# Patient Record
Sex: FEMALE | Race: ASIAN | NOT HISPANIC OR LATINO | URBAN - METROPOLITAN AREA
[De-identification: names, ages, dates, MRNs, and addresses within clinical notes are randomized per-mention and may not be internally consistent; named-entity substitution may affect disease eponyms.]

---

## 2019-04-07 RX ORDER — LEVOTHYROXINE SODIUM 150 UG/1
TABLET ORAL
Qty: 30 TABLET | Refills: 3 | Status: SHIPPED | OUTPATIENT
Start: 2019-04-07 | End: 2019-09-19 | Stop reason: SDUPTHER

## 2019-04-29 RX ORDER — LEVOTHYROXINE SODIUM 125 UG/1
TABLET ORAL
Qty: 30 TABLET | Refills: 3 | OUTPATIENT
Start: 2019-04-29

## 2019-05-20 ENCOUNTER — OFFICE VISIT (OUTPATIENT)
Dept: INTERNAL MEDICINE | Facility: CLINIC | Age: 54
End: 2019-05-20
Payer: COMMERCIAL

## 2019-05-20 VITALS
HEART RATE: 74 BPM | TEMPERATURE: 98 F | SYSTOLIC BLOOD PRESSURE: 132 MMHG | OXYGEN SATURATION: 98 % | WEIGHT: 179 LBS | RESPIRATION RATE: 16 BRPM | DIASTOLIC BLOOD PRESSURE: 82 MMHG | BODY MASS INDEX: 35.14 KG/M2 | HEIGHT: 60 IN

## 2019-05-20 DIAGNOSIS — Z00.00 ROUTINE GENERAL MEDICAL EXAMINATION AT A HEALTH CARE FACILITY: Primary | ICD-10-CM

## 2019-05-20 DIAGNOSIS — Z23 NEED FOR TDAP VACCINATION: ICD-10-CM

## 2019-05-20 DIAGNOSIS — E03.9 HYPOTHYROIDISM, UNSPECIFIED TYPE: ICD-10-CM

## 2019-05-20 DIAGNOSIS — E66.9 CLASS 1 OBESITY WITH BODY MASS INDEX (BMI) OF 34.0 TO 34.9 IN ADULT, UNSPECIFIED OBESITY TYPE, UNSPECIFIED WHETHER SERIOUS COMORBIDITY PRESENT: ICD-10-CM

## 2019-05-20 DIAGNOSIS — E53.8 DISORDER OF VITAMIN B12: ICD-10-CM

## 2019-05-20 PROBLEM — D64.9 ANEMIA: Status: ACTIVE | Noted: 2019-05-20

## 2019-05-20 PROBLEM — D50.9 IRON DEFICIENCY ANEMIA: Status: ACTIVE | Noted: 2019-05-20

## 2019-05-20 PROBLEM — J45.909 ASTHMA: Status: ACTIVE | Noted: 2019-05-20

## 2019-05-20 PROCEDURE — 99396 PR PREVENTIVE VISIT,EST,40-64: ICD-10-PCS | Mod: S$GLB,,, | Performed by: INTERNAL MEDICINE

## 2019-05-20 PROCEDURE — 99396 PREV VISIT EST AGE 40-64: CPT | Mod: S$GLB,,, | Performed by: INTERNAL MEDICINE

## 2019-05-20 RX ORDER — IPRATROPIUM BROMIDE AND ALBUTEROL SULFATE 2.5; .5 MG/3ML; MG/3ML
SOLUTION RESPIRATORY (INHALATION)
COMMUNITY
End: 2021-08-02

## 2019-05-20 RX ORDER — CLOTRIMAZOLE AND BETAMETHASONE DIPROPIONATE 10; .64 MG/G; MG/G
CREAM TOPICAL
COMMUNITY
End: 2021-08-18

## 2019-05-20 RX ORDER — ALBUTEROL SULFATE 90 UG/1
AEROSOL, METERED RESPIRATORY (INHALATION)
COMMUNITY
End: 2019-11-13 | Stop reason: SDUPTHER

## 2019-05-20 RX ORDER — MONTELUKAST SODIUM 10 MG/1
1 TABLET ORAL DAILY
COMMUNITY
End: 2019-11-16 | Stop reason: SDUPTHER

## 2019-05-20 RX ORDER — FLUTICASONE FUROATE, UMECLIDINIUM BROMIDE AND VILANTEROL TRIFENATATE 100; 62.5; 25 UG/1; UG/1; UG/1
1 POWDER RESPIRATORY (INHALATION) DAILY
Refills: 5 | COMMUNITY
Start: 2019-03-12 | End: 2019-11-13 | Stop reason: SDUPTHER

## 2019-05-20 RX ORDER — FLUTICASONE PROPIONATE 50 MCG
2 SPRAY, SUSPENSION (ML) NASAL NIGHTLY
COMMUNITY

## 2019-05-20 RX ORDER — EPINEPHRINE 0.3 MG/.3ML
INJECTION SUBCUTANEOUS
COMMUNITY
End: 2019-08-30 | Stop reason: SDUPTHER

## 2019-05-20 NOTE — PROGRESS NOTES
Subjective:      Patient ID: Adri Thomason is a 54 y.o. female.    Chief Complaint: Well Woman    HPI: Patient with h/o Hypothyroidism, last tsh was at goal. Patient reports intentional weight loss ( 5 Lbs) with intermittent fasting, fatigue is improved, no dry skin and hair loss.    Patient with recently diagnosed DM. Patient has lost 25 lbs, and is off of metformin + last A1c was 5.9. Patient denies polyuria, polydipsia, no numbness in hands and feet.    Patient blood pressures are much better controlled though patient is off of Losartan. Patient denies any dizziness, lightheadedness no chest pain or shortness of breath.    Patient with h/o asthma symptoms are under good control with Trelegy and patient is being followed by Allergist/Immunologist. patient reports symptoms are much improved and she rarely uses rescue inhaler.    Patient with history of vitamin B12 deficiency. Patient is getting B12 supplement/injection every month. Patient repeat B12 levels were high.     Review of Systems   Constitutional: Negative for chills, diaphoresis, fever, malaise/fatigue and weight loss.   HENT: Negative for congestion, ear pain, sinus pain, sore throat and tinnitus.    Eyes: Negative for blurred vision and photophobia.   Respiratory: Negative for cough, hemoptysis, shortness of breath and wheezing.    Cardiovascular: Negative for chest pain, palpitations, orthopnea, leg swelling and PND.   Gastrointestinal: Negative for abdominal pain, blood in stool, constipation, diarrhea, heartburn, melena, nausea and vomiting.   Genitourinary: Negative for dysuria, frequency and urgency.   Musculoskeletal: Negative for back pain, myalgias and neck pain.   Skin: Negative for rash.   Neurological: Negative for dizziness, tremors, seizures, loss of consciousness and weakness.   Endo/Heme/Allergies: Negative for polydipsia.   Psychiatric/Behavioral: Negative for depression and hallucinations. The patient does not have insomnia.       Objective:     Physical Exam   Constitutional: She is oriented to person, place, and time. No distress.   Neck: No thyromegaly present.   Cardiovascular: Normal rate, regular rhythm and normal heart sounds.   No murmur heard.  Pulmonary/Chest: Effort normal and breath sounds normal. No respiratory distress. She has no wheezes. She exhibits no tenderness.   Abdominal: Soft. Bowel sounds are normal. She exhibits no distension. There is no tenderness.   Musculoskeletal: She exhibits no edema or tenderness.   Lymphadenopathy:     She has no cervical adenopathy.   Neurological: She is alert and oriented to person, place, and time. No cranial nerve deficit or sensory deficit.   Skin: She is not diaphoretic.   Psychiatric: She has a normal mood and affect. Her behavior is normal. Judgment and thought content normal.     Assessment:     1. Hypothyroidism, unspecified type    2. Disorder of vitamin B12    3. Class 1 obesity with body mass index (BMI) of 34.0 to 34.9 in adult, unspecified obesity type, unspecified whether serious comorbidity present    4. Routine general medical examination at a health care facility       Plan:   Patient is using levothyroxine 150 mcg.  Will check thyroid function on return  Patient last vitamin B12 levels were high..  Will switch to oral B12 2000 mcg daily   Patient is losing lot of weight with aggressive diet and intermittent fasting.  Patient is encouraged to continue  Patient declined colonoscopy but will do cologuard.  Orders already placed  Patient mammogram is up-to-date.   Patient to get Pap smear schedule.

## 2019-08-30 RX ORDER — EPINEPHRINE 0.3 MG/.3ML
INJECTION SUBCUTANEOUS
Qty: 1 DEVICE | Refills: 3 | Status: SHIPPED | OUTPATIENT
Start: 2019-08-30 | End: 2022-04-21

## 2019-08-30 NOTE — TELEPHONE ENCOUNTER
Patient requesting new rx  EpiPen 0.3 mg auto-injector 2 pack  Use 1 injection as needed for severe allergic reaction  
sudden onset

## 2019-09-19 RX ORDER — LEVOTHYROXINE SODIUM 150 UG/1
TABLET ORAL
Qty: 30 TABLET | Refills: 3 | Status: SHIPPED | OUTPATIENT
Start: 2019-09-19 | End: 2020-02-28

## 2019-09-20 LAB — CRC RECOMMENDATION EXT: NORMAL

## 2019-10-22 LAB — PAP RECOMMENDATION EXT: NORMAL

## 2019-11-12 DIAGNOSIS — J45.909 ASTHMA, UNSPECIFIED ASTHMA SEVERITY, UNSPECIFIED WHETHER COMPLICATED, UNSPECIFIED WHETHER PERSISTENT: Primary | ICD-10-CM

## 2019-11-13 RX ORDER — ALBUTEROL SULFATE 90 UG/1
AEROSOL, METERED RESPIRATORY (INHALATION)
Qty: 18 INHALER | Refills: 3 | Status: SHIPPED | OUTPATIENT
Start: 2019-11-13 | End: 2022-08-15 | Stop reason: SDUPTHER

## 2019-11-13 RX ORDER — FLUTICASONE FUROATE, UMECLIDINIUM BROMIDE AND VILANTEROL TRIFENATATE 100; 62.5; 25 UG/1; UG/1; UG/1
POWDER RESPIRATORY (INHALATION)
Qty: 3 EACH | Refills: 3 | Status: SHIPPED | OUTPATIENT
Start: 2019-11-13 | End: 2020-01-31 | Stop reason: SDUPTHER

## 2019-11-18 RX ORDER — MONTELUKAST SODIUM 10 MG/1
TABLET ORAL
Qty: 30 TABLET | Refills: 11 | Status: SHIPPED | OUTPATIENT
Start: 2019-11-18 | End: 2020-02-27 | Stop reason: SDUPTHER

## 2020-01-31 DIAGNOSIS — J45.909 ASTHMA, UNSPECIFIED ASTHMA SEVERITY, UNSPECIFIED WHETHER COMPLICATED, UNSPECIFIED WHETHER PERSISTENT: ICD-10-CM

## 2020-02-07 ENCOUNTER — OFFICE VISIT (OUTPATIENT)
Dept: INTERNAL MEDICINE | Facility: CLINIC | Age: 55
End: 2020-02-07
Payer: COMMERCIAL

## 2020-02-07 VITALS
SYSTOLIC BLOOD PRESSURE: 132 MMHG | HEIGHT: 60 IN | HEART RATE: 67 BPM | RESPIRATION RATE: 16 BRPM | WEIGHT: 194 LBS | BODY MASS INDEX: 38.09 KG/M2 | DIASTOLIC BLOOD PRESSURE: 90 MMHG | OXYGEN SATURATION: 97 % | TEMPERATURE: 98 F

## 2020-02-07 DIAGNOSIS — E03.9 HYPOTHYROIDISM, UNSPECIFIED TYPE: ICD-10-CM

## 2020-02-07 DIAGNOSIS — E66.9 OBESITY (BMI 35.0-39.9 WITHOUT COMORBIDITY): ICD-10-CM

## 2020-02-07 DIAGNOSIS — J45.901 ASTHMA WITH ACUTE EXACERBATION, UNSPECIFIED ASTHMA SEVERITY, UNSPECIFIED WHETHER PERSISTENT: ICD-10-CM

## 2020-02-07 DIAGNOSIS — Z00.00 ROUTINE GENERAL MEDICAL EXAMINATION AT A HEALTH CARE FACILITY: Primary | ICD-10-CM

## 2020-02-07 PROCEDURE — 99396 PR PREVENTIVE VISIT,EST,40-64: ICD-10-PCS | Mod: 25,S$GLB,, | Performed by: INTERNAL MEDICINE

## 2020-02-07 PROCEDURE — 99396 PREV VISIT EST AGE 40-64: CPT | Mod: 25,S$GLB,, | Performed by: INTERNAL MEDICINE

## 2020-02-07 PROCEDURE — 96372 PR INJECTION,THERAP/PROPH/DIAG2ST, IM OR SUBCUT: ICD-10-PCS | Mod: S$GLB,,, | Performed by: INTERNAL MEDICINE

## 2020-02-07 PROCEDURE — 96372 THER/PROPH/DIAG INJ SC/IM: CPT | Mod: S$GLB,,, | Performed by: INTERNAL MEDICINE

## 2020-02-07 RX ORDER — FLUTICASONE FUROATE, UMECLIDINIUM BROMIDE AND VILANTEROL TRIFENATATE 100; 62.5; 25 UG/1; UG/1; UG/1
1 POWDER RESPIRATORY (INHALATION) DAILY
COMMUNITY
Start: 2020-02-04 | End: 2020-02-07

## 2020-02-07 RX ORDER — PREDNISONE 20 MG/1
20 TABLET ORAL DAILY
Qty: 7 TABLET | Refills: 0 | Status: SHIPPED | OUTPATIENT
Start: 2020-02-07 | End: 2020-02-14

## 2020-02-07 RX ORDER — BETAMETHASONE SODIUM PHOSPHATE AND BETAMETHASONE ACETATE 3; 3 MG/ML; MG/ML
6 INJECTION, SUSPENSION INTRA-ARTICULAR; INTRALESIONAL; INTRAMUSCULAR; SOFT TISSUE
Status: COMPLETED | OUTPATIENT
Start: 2020-02-07 | End: 2020-02-07

## 2020-02-07 RX ORDER — PROMETHAZINE HYDROCHLORIDE AND DEXTROMETHORPHAN HYDROBROMIDE 6.25; 15 MG/5ML; MG/5ML
5 SYRUP ORAL EVERY 4 HOURS PRN
Qty: 180 ML | Refills: 0 | Status: SHIPPED | OUTPATIENT
Start: 2020-02-07 | End: 2020-02-13

## 2020-02-07 RX ADMIN — BETAMETHASONE SODIUM PHOSPHATE AND BETAMETHASONE ACETATE 6 MG: 3; 3 INJECTION, SUSPENSION INTRA-ARTICULAR; INTRALESIONAL; INTRAMUSCULAR; SOFT TISSUE at 10:02

## 2020-02-07 NOTE — PROGRESS NOTES
Subjective:      Patient ID: Adri Thomason is a 54 y.o. female.    Chief Complaint: Cough and Wheezing    Patient with h/o Asthma came in today with complains of shortness of breath + cough x 3 days. Patient reports that she feels feverish but never had fever. Patient reports some runny nose + nasal congestion, post nasal drip. Patient is taking Trelegy and Ventolin with little help. Patient is taking Nebuliser frequently with little help. Patient reports bilateral rib pains because of cough.      Patient with h/o Hypothyroidism, last tsh was at goal. Patient reports intentional weight gained ( 15 Lbs) with intermittent fasting, fatigue is improved, no dry skin and hair loss.    Patient was recently diagnosed with DM.  Patient diet and exercise aggressively and lost 25 lb and patient's last A1c was 5.9.  Patient has gained 15 lb in last few months Patient denies polyuria, polydipsia, no numbness in hands and feet.    Patient blood pressures are much better controlled after losing weight without Losartan. Patient reports that for the last few weeks the BP has been running high.  Patient denies any dizziness, lightheadedness no chest pain or shortness of breath.      Review of Systems   Constitutional: Negative for chills, diaphoresis, fever, malaise/fatigue and weight loss (15 lbs).   HENT: Negative for congestion, ear pain, sinus pain, sore throat and tinnitus.    Eyes: Negative for blurred vision and photophobia.   Respiratory: Positive for cough, shortness of breath and wheezing. Negative for hemoptysis.    Cardiovascular: Negative for chest pain, palpitations, orthopnea, leg swelling and PND.   Gastrointestinal: Negative for abdominal pain, blood in stool, constipation, diarrhea, heartburn, melena, nausea and vomiting.   Genitourinary: Negative for dysuria, frequency and urgency.   Musculoskeletal: Negative for back pain, myalgias and neck pain.   Skin: Negative for rash.   Neurological: Positive for headaches.  Negative for dizziness, tremors, seizures, loss of consciousness and weakness.   Endo/Heme/Allergies: Negative for polydipsia.   Psychiatric/Behavioral: Negative for depression and hallucinations. The patient does not have insomnia.      Objective:     Physical Exam   Constitutional: She is oriented to person, place, and time. No distress.   Neck: No thyromegaly present.   Cardiovascular: Normal rate, regular rhythm and normal heart sounds.   No murmur heard.  Pulmonary/Chest: Effort normal and breath sounds normal. No respiratory distress. She has no wheezes. She exhibits no tenderness.   Bilateral rib tenderness to palpation   Abdominal: Soft. Bowel sounds are normal. She exhibits no distension. There is no tenderness.   Musculoskeletal: She exhibits no edema or tenderness.   Lymphadenopathy:     She has no cervical adenopathy.   Neurological: She is alert and oriented to person, place, and time. No cranial nerve deficit or sensory deficit.   Skin: She is not diaphoretic.   Psychiatric: She has a normal mood and affect. Her behavior is normal. Judgment and thought content normal.     Assessment:     1. Routine general medical examination at a health care facility    2. Asthma with acute exacerbation, unspecified asthma severity, unspecified whether persistent    3. Obesity (BMI 35.0-39.9 without comorbidity)    4. Hypothyroidism, unspecified type       Plan:   Patient seem to have acute asthma exacerbation.  Will give Celestone shot + prednisone x 7 days.  Patient has nebulizer treat at home.   Will order annual wellness exam labs + will check A1c + vitamin-D and B12  Patient previously lost 25 lb with intermittent fasting and exercise.  Patient is lack Potts and seem to have gained some weight back.    Advised patient to adhere with diet and exercise.  Patient last TSH was at goal.  Will check thyroid functions again  Patient blood pressures are running high.  Advised patient to monitor blood pressures at  home  If blood pressures stay high will add losartan again

## 2020-02-12 DIAGNOSIS — Z20.828 EXPOSURE TO THE FLU: Primary | ICD-10-CM

## 2020-02-12 DIAGNOSIS — J45.901 ASTHMA WITH ACUTE EXACERBATION, UNSPECIFIED ASTHMA SEVERITY, UNSPECIFIED WHETHER PERSISTENT: ICD-10-CM

## 2020-02-12 RX ORDER — OSELTAMIVIR PHOSPHATE 75 MG/1
75 CAPSULE ORAL DAILY
Qty: 7 CAPSULE | Refills: 0 | Status: SHIPPED | OUTPATIENT
Start: 2020-02-12 | End: 2020-02-17

## 2020-02-13 RX ORDER — PROMETHAZINE HYDROCHLORIDE AND DEXTROMETHORPHAN HYDROBROMIDE 6.25; 15 MG/5ML; MG/5ML
SYRUP ORAL
Qty: 180 ML | Refills: 0 | Status: SHIPPED | OUTPATIENT
Start: 2020-02-13 | End: 2021-05-05

## 2020-02-27 DIAGNOSIS — J45.909 ASTHMA, UNSPECIFIED ASTHMA SEVERITY, UNSPECIFIED WHETHER COMPLICATED, UNSPECIFIED WHETHER PERSISTENT: ICD-10-CM

## 2020-02-27 RX ORDER — MONTELUKAST SODIUM 10 MG/1
10 TABLET ORAL DAILY
Qty: 90 TABLET | Refills: 3 | Status: SHIPPED | OUTPATIENT
Start: 2020-02-27 | End: 2020-05-27

## 2020-02-28 RX ORDER — LEVOTHYROXINE SODIUM 150 UG/1
TABLET ORAL
Qty: 30 TABLET | Refills: 3 | Status: SHIPPED | OUTPATIENT
Start: 2020-02-28 | End: 2021-02-11

## 2020-05-26 DIAGNOSIS — B37.31 CANDIDA VAGINITIS: Primary | ICD-10-CM

## 2020-05-26 RX ORDER — FLUCONAZOLE 150 MG/1
150 TABLET ORAL DAILY
Qty: 1 TABLET | Refills: 1 | Status: SHIPPED | OUTPATIENT
Start: 2020-05-26 | End: 2020-05-27

## 2020-10-19 DIAGNOSIS — B37.31 CANDIDA VAGINITIS: ICD-10-CM

## 2020-10-19 DIAGNOSIS — J45.909 ASTHMA, UNSPECIFIED ASTHMA SEVERITY, UNSPECIFIED WHETHER COMPLICATED, UNSPECIFIED WHETHER PERSISTENT: Primary | ICD-10-CM

## 2020-10-19 RX ORDER — MONTELUKAST SODIUM 10 MG/1
10 TABLET ORAL NIGHTLY
Qty: 30 TABLET | Refills: 11 | Status: SHIPPED | OUTPATIENT
Start: 2020-10-19 | End: 2020-11-18

## 2020-10-19 RX ORDER — FLUCONAZOLE 150 MG/1
150 TABLET ORAL DAILY
Qty: 1 TABLET | Refills: 0 | Status: SHIPPED | OUTPATIENT
Start: 2020-10-19 | End: 2020-10-20

## 2020-12-08 ENCOUNTER — CLINICAL SUPPORT (OUTPATIENT)
Dept: PRIMARY CARE CLINIC | Facility: CLINIC | Age: 55
End: 2020-12-08
Payer: COMMERCIAL

## 2020-12-08 NOTE — PROGRESS NOTES
Pt came in today to get weekly allergy injections... Purple Vial #1 Conc: 1:1,000,000 V/V Exp 10/2021 0.10cc given in the Upper Left Arm;  Purple Vial#2  Conc: 1:1,000,000 V/V Exp 10/2021  .010cc given in the Lower Left Arm;  Purple Vial #3  Conc: 1:1,000,000 V/V  Exp 10/2021 0.10cc given in the Upper Right Arm;  Purple Vial #4 Conc: 1:1,000,000 V/V Exp 10/2021 0.10cc given in the Lower Right Arm.. Pt had no reactions to injections and tolerated well... pt received 0.10cc today because she was 18 days late and had to repeat prior dose.  Pt next date to received injection will be 12/15/2020.  Pt understood if any reaction occur to contact MD office, go to nearest ER or call 911.

## 2020-12-16 ENCOUNTER — CLINICAL SUPPORT (OUTPATIENT)
Dept: PRIMARY CARE CLINIC | Facility: CLINIC | Age: 55
End: 2020-12-16
Payer: COMMERCIAL

## 2020-12-16 NOTE — PROGRESS NOTES
Pt came in today to get weekly allergy injections... Purple Vial #1 Conc: 1:1,000,000 V/V Exp 10/2021 0.20cc given in the Upper Left Arm;  Purple Vial#2  Conc: 1:1,000,000 V/V Exp 10/2021  .020cc given in the Lower Left Arm;  Purple Vial #3  Conc: 1:1,000,000 V/V  Exp 10/2021 0.20cc given in the Upper Right Arm;  Purple Vial #4 Conc: 1:1,000,000 V/V Exp 10/2021 0.20cc given in the Lower Right Arm.. Pt had no reactions to injections and tolerated well... pt received 0.20cc today.  Pt next date to received injection will be 12/22/2020.  Pt understood if any reaction occur to contact MD office, go to nearest ER or call 911.

## 2020-12-23 ENCOUNTER — CLINICAL SUPPORT (OUTPATIENT)
Dept: PRIMARY CARE CLINIC | Facility: CLINIC | Age: 55
End: 2020-12-23
Payer: COMMERCIAL

## 2020-12-23 NOTE — PROGRESS NOTES
Pt came in today to get weekly allergy injections... Purple Vial #1 Conc: 1:1,000,000 V/V Exp 10/2021 0.30cc given in the Upper Left Arm;  Purple Vial#2  Conc: 1:1,000,000 V/V Exp 10/2021  .030cc given in the Lower Left Arm;  Purple Vial #3  Conc: 1:1,000,000 V/V  Exp 10/2021 0.30cc given in the Upper Right Arm;  Purple Vial #4 Conc: 1:1,000,000 V/V Exp 10/2021 0.30cc given in the Lower Right Arm.. Pt had no reactions to injections and tolerated well... pt received 0.30cc today.  Pt next date to received injection will be 12/30/2020.  Pt understood if any reaction occur to contact MD office, go to nearest ER or call 911.

## 2020-12-28 ENCOUNTER — TELEPHONE (OUTPATIENT)
Dept: PRIMARY CARE CLINIC | Facility: CLINIC | Age: 55
End: 2020-12-28

## 2020-12-28 NOTE — TELEPHONE ENCOUNTER
"Patient is requesting a prescription be sent out for her "water pill". I don't see any medication in her chart  as such but she states you know what they are. She also states that it is HCT but doesn't recall the strength. She states that she wants its to go to [facility] in [place] since she's out of town due to family emergency. The phone number to the pharmacy is [phone]  "

## 2020-12-28 NOTE — TELEPHONE ENCOUNTER
"----- Message from Ernestina Balderrama sent at 12/28/2020  9:05 AM CST -----  Regarding: refill   Patient is in [place] due to family emergency and asking for a refill of her "water pill" but I don't see anything in her chart.  Please call her to discuss.  She wants refill sent to [facility] on [address] in [place].  Phone to pharmacy is [phone].    "

## 2021-01-04 ENCOUNTER — CLINICAL SUPPORT (OUTPATIENT)
Dept: PRIMARY CARE CLINIC | Facility: CLINIC | Age: 56
End: 2021-01-04
Payer: COMMERCIAL

## 2021-01-12 ENCOUNTER — CLINICAL SUPPORT (OUTPATIENT)
Dept: PRIMARY CARE CLINIC | Facility: CLINIC | Age: 56
End: 2021-01-12
Payer: COMMERCIAL

## 2021-01-13 DIAGNOSIS — Z00.00 ROUTINE GENERAL MEDICAL EXAMINATION AT A HEALTH CARE FACILITY: Primary | ICD-10-CM

## 2021-01-20 ENCOUNTER — CLINICAL SUPPORT (OUTPATIENT)
Dept: PRIMARY CARE CLINIC | Facility: CLINIC | Age: 56
End: 2021-01-20
Payer: COMMERCIAL

## 2021-01-20 DIAGNOSIS — Z51.6 DESENSITIZATION TO ALLERGY SHOT: Primary | ICD-10-CM

## 2021-01-28 ENCOUNTER — CLINICAL SUPPORT (OUTPATIENT)
Dept: PRIMARY CARE CLINIC | Facility: CLINIC | Age: 56
End: 2021-01-28
Payer: COMMERCIAL

## 2021-01-28 DIAGNOSIS — J45.909 ASTHMA, UNSPECIFIED ASTHMA SEVERITY, UNSPECIFIED WHETHER COMPLICATED, UNSPECIFIED WHETHER PERSISTENT: Primary | ICD-10-CM

## 2021-02-04 ENCOUNTER — CLINICAL SUPPORT (OUTPATIENT)
Dept: PRIMARY CARE CLINIC | Facility: CLINIC | Age: 56
End: 2021-02-04
Payer: COMMERCIAL

## 2021-02-04 DIAGNOSIS — Z00.00 ROUTINE GENERAL MEDICAL EXAMINATION AT A HEALTH CARE FACILITY: Primary | ICD-10-CM

## 2021-02-11 ENCOUNTER — CLINICAL SUPPORT (OUTPATIENT)
Dept: PRIMARY CARE CLINIC | Facility: CLINIC | Age: 56
End: 2021-02-11
Payer: COMMERCIAL

## 2021-02-25 ENCOUNTER — CLINICAL SUPPORT (OUTPATIENT)
Dept: PRIMARY CARE CLINIC | Facility: CLINIC | Age: 56
End: 2021-02-25
Payer: COMMERCIAL

## 2021-03-10 ENCOUNTER — OFFICE VISIT (OUTPATIENT)
Dept: OBSTETRICS AND GYNECOLOGY | Facility: CLINIC | Age: 56
End: 2021-03-10
Payer: COMMERCIAL

## 2021-03-10 VITALS
DIASTOLIC BLOOD PRESSURE: 91 MMHG | HEIGHT: 59 IN | BODY MASS INDEX: 39.31 KG/M2 | WEIGHT: 195 LBS | SYSTOLIC BLOOD PRESSURE: 140 MMHG

## 2021-03-10 DIAGNOSIS — Z00.00 ENCOUNTER FOR WELLNESS EXAMINATION: Primary | ICD-10-CM

## 2021-03-10 DIAGNOSIS — N95.2 ATROPHIC VAGINITIS: ICD-10-CM

## 2021-03-10 DIAGNOSIS — N76.0 VAGINITIS AND VULVOVAGINITIS: ICD-10-CM

## 2021-03-10 PROCEDURE — 99396 PR PREVENTIVE VISIT,EST,40-64: ICD-10-PCS | Mod: S$GLB,,, | Performed by: OBSTETRICS & GYNECOLOGY

## 2021-03-10 PROCEDURE — 3008F BODY MASS INDEX DOCD: CPT | Mod: CPTII,S$GLB,, | Performed by: OBSTETRICS & GYNECOLOGY

## 2021-03-10 PROCEDURE — 99396 PREV VISIT EST AGE 40-64: CPT | Mod: S$GLB,,, | Performed by: OBSTETRICS & GYNECOLOGY

## 2021-03-10 PROCEDURE — 3008F PR BODY MASS INDEX (BMI) DOCUMENTED: ICD-10-PCS | Mod: CPTII,S$GLB,, | Performed by: OBSTETRICS & GYNECOLOGY

## 2021-03-10 RX ORDER — MONTELUKAST SODIUM 10 MG/1
10 TABLET ORAL NIGHTLY
COMMUNITY
Start: 2021-01-15 | End: 2022-01-18

## 2021-03-10 RX ORDER — CLOBETASOL PROPIONATE 0.5 MG/G
OINTMENT TOPICAL 2 TIMES DAILY
Qty: 1 TUBE | Refills: 3 | Status: SHIPPED | OUTPATIENT
Start: 2021-03-10 | End: 2024-02-22

## 2021-03-10 RX ORDER — ESTRADIOL 0.1 MG/G
2 CREAM VAGINAL DAILY
Qty: 1 TUBE | Refills: 11 | Status: SHIPPED | OUTPATIENT
Start: 2021-03-10 | End: 2022-03-07

## 2021-03-10 RX ORDER — FLUCONAZOLE 150 MG/1
150 TABLET ORAL EVERY OTHER DAY
Qty: 2 TABLET | Refills: 0 | Status: SHIPPED | OUTPATIENT
Start: 2021-03-10 | End: 2021-03-13

## 2021-03-18 ENCOUNTER — CLINICAL SUPPORT (OUTPATIENT)
Dept: PRIMARY CARE CLINIC | Facility: CLINIC | Age: 56
End: 2021-03-18
Payer: COMMERCIAL

## 2021-03-18 DIAGNOSIS — J45.909 ASTHMA, UNSPECIFIED ASTHMA SEVERITY, UNSPECIFIED WHETHER COMPLICATED, UNSPECIFIED WHETHER PERSISTENT: Primary | ICD-10-CM

## 2021-04-01 ENCOUNTER — CLINICAL SUPPORT (OUTPATIENT)
Dept: PRIMARY CARE CLINIC | Facility: CLINIC | Age: 56
End: 2021-04-01
Payer: COMMERCIAL

## 2021-04-13 ENCOUNTER — CLINICAL SUPPORT (OUTPATIENT)
Dept: PRIMARY CARE CLINIC | Facility: CLINIC | Age: 56
End: 2021-04-13
Payer: COMMERCIAL

## 2021-04-20 ENCOUNTER — CLINICAL SUPPORT (OUTPATIENT)
Dept: PRIMARY CARE CLINIC | Facility: CLINIC | Age: 56
End: 2021-04-20
Payer: COMMERCIAL

## 2021-05-05 ENCOUNTER — TELEPHONE (OUTPATIENT)
Dept: OBSTETRICS AND GYNECOLOGY | Facility: CLINIC | Age: 56
End: 2021-05-05

## 2021-05-28 ENCOUNTER — CLINICAL SUPPORT (OUTPATIENT)
Dept: PRIMARY CARE CLINIC | Facility: CLINIC | Age: 56
End: 2021-05-28
Payer: COMMERCIAL

## 2021-06-03 ENCOUNTER — PATIENT OUTREACH (OUTPATIENT)
Dept: ADMINISTRATIVE | Facility: HOSPITAL | Age: 56
End: 2021-06-03

## 2021-06-04 ENCOUNTER — CLINICAL SUPPORT (OUTPATIENT)
Dept: PRIMARY CARE CLINIC | Facility: CLINIC | Age: 56
End: 2021-06-04
Payer: COMMERCIAL

## 2021-06-04 DIAGNOSIS — J45.909 ASTHMA, UNSPECIFIED ASTHMA SEVERITY, UNSPECIFIED WHETHER COMPLICATED, UNSPECIFIED WHETHER PERSISTENT: Primary | ICD-10-CM

## 2021-06-10 ENCOUNTER — CLINICAL SUPPORT (OUTPATIENT)
Dept: PRIMARY CARE CLINIC | Facility: CLINIC | Age: 56
End: 2021-06-10
Payer: COMMERCIAL

## 2021-06-10 DIAGNOSIS — J45.909 ASTHMA, UNSPECIFIED ASTHMA SEVERITY, UNSPECIFIED WHETHER COMPLICATED, UNSPECIFIED WHETHER PERSISTENT: Primary | ICD-10-CM

## 2021-06-24 ENCOUNTER — CLINICAL SUPPORT (OUTPATIENT)
Dept: PRIMARY CARE CLINIC | Facility: CLINIC | Age: 56
End: 2021-06-24
Payer: COMMERCIAL

## 2021-06-24 DIAGNOSIS — J45.909 ASTHMA, UNSPECIFIED ASTHMA SEVERITY, UNSPECIFIED WHETHER COMPLICATED, UNSPECIFIED WHETHER PERSISTENT: Primary | ICD-10-CM

## 2021-07-06 ENCOUNTER — CLINICAL SUPPORT (OUTPATIENT)
Dept: PRIMARY CARE CLINIC | Facility: CLINIC | Age: 56
End: 2021-07-06
Payer: COMMERCIAL

## 2021-07-06 DIAGNOSIS — J45.909 ASTHMA, UNSPECIFIED ASTHMA SEVERITY, UNSPECIFIED WHETHER COMPLICATED, UNSPECIFIED WHETHER PERSISTENT: Primary | ICD-10-CM

## 2021-07-06 DIAGNOSIS — J45.901 EXACERBATION OF ASTHMA, UNSPECIFIED ASTHMA SEVERITY, UNSPECIFIED WHETHER PERSISTENT: Primary | ICD-10-CM

## 2021-07-06 RX ORDER — PREDNISONE 20 MG/1
20 TABLET ORAL DAILY
Qty: 7 TABLET | Refills: 0 | Status: SHIPPED | OUTPATIENT
Start: 2021-07-06 | End: 2021-07-13

## 2021-07-06 RX ORDER — BETAMETHASONE SODIUM PHOSPHATE AND BETAMETHASONE ACETATE 3; 3 MG/ML; MG/ML
6 INJECTION, SUSPENSION INTRA-ARTICULAR; INTRALESIONAL; INTRAMUSCULAR; SOFT TISSUE
Status: DISCONTINUED | OUTPATIENT
Start: 2021-07-06 | End: 2021-08-18

## 2021-07-19 RX ORDER — NYSTATIN 100000 [USP'U]/ML
4 SUSPENSION ORAL 4 TIMES DAILY
Qty: 160 ML | Refills: 0 | Status: SHIPPED | OUTPATIENT
Start: 2021-07-19 | End: 2021-07-29

## 2021-07-23 ENCOUNTER — CLINICAL SUPPORT (OUTPATIENT)
Dept: PRIMARY CARE CLINIC | Facility: CLINIC | Age: 56
End: 2021-07-23
Payer: COMMERCIAL

## 2021-07-23 VITALS
DIASTOLIC BLOOD PRESSURE: 90 MMHG | OXYGEN SATURATION: 100 % | HEART RATE: 99 BPM | SYSTOLIC BLOOD PRESSURE: 154 MMHG | TEMPERATURE: 98 F

## 2021-07-23 DIAGNOSIS — J45.901 EXACERBATION OF ASTHMA, UNSPECIFIED ASTHMA SEVERITY, UNSPECIFIED WHETHER PERSISTENT: Primary | ICD-10-CM

## 2021-07-23 DIAGNOSIS — R03.0 ELEVATED BLOOD PRESSURE READING: ICD-10-CM

## 2021-07-23 PROCEDURE — 94640 AIRWAY INHALATION TREATMENT: CPT | Mod: S$GLB,,, | Performed by: INTERNAL MEDICINE

## 2021-07-23 PROCEDURE — 99214 OFFICE O/P EST MOD 30 MIN: CPT | Mod: 25,S$GLB,, | Performed by: INTERNAL MEDICINE

## 2021-07-23 PROCEDURE — 96372 PR INJECTION,THERAP/PROPH/DIAG2ST, IM OR SUBCUT: ICD-10-PCS | Mod: 59,S$GLB,, | Performed by: INTERNAL MEDICINE

## 2021-07-23 PROCEDURE — 96372 THER/PROPH/DIAG INJ SC/IM: CPT | Mod: 59,S$GLB,, | Performed by: INTERNAL MEDICINE

## 2021-07-23 PROCEDURE — 99214 PR OFFICE/OUTPT VISIT, EST, LEVL IV, 30-39 MIN: ICD-10-PCS | Mod: 25,S$GLB,, | Performed by: INTERNAL MEDICINE

## 2021-07-23 PROCEDURE — 94640 PR INHAL RX, AIRWAY OBST/DX SPUTUM INDUCT: ICD-10-PCS | Mod: S$GLB,,, | Performed by: INTERNAL MEDICINE

## 2021-07-23 RX ORDER — METHYLPREDNISOLONE ACETATE 80 MG/ML
80 INJECTION, SUSPENSION INTRA-ARTICULAR; INTRALESIONAL; INTRAMUSCULAR; SOFT TISSUE
Status: COMPLETED | OUTPATIENT
Start: 2021-07-23 | End: 2021-07-23

## 2021-07-23 RX ORDER — HYDROCHLOROTHIAZIDE 12.5 MG/1
12.5 TABLET ORAL DAILY
Qty: 30 TABLET | Refills: 1 | Status: SHIPPED | OUTPATIENT
Start: 2021-07-23 | End: 2021-08-16

## 2021-07-23 RX ORDER — IPRATROPIUM BROMIDE AND ALBUTEROL SULFATE 2.5; .5 MG/3ML; MG/3ML
3 SOLUTION RESPIRATORY (INHALATION)
Status: COMPLETED | OUTPATIENT
Start: 2021-07-23 | End: 2021-07-23

## 2021-07-23 RX ORDER — METHYLPREDNISOLONE 4 MG/1
TABLET ORAL
Qty: 1 PACKAGE | Refills: 0 | Status: SHIPPED | OUTPATIENT
Start: 2021-07-23 | End: 2021-08-13

## 2021-07-23 RX ADMIN — IPRATROPIUM BROMIDE AND ALBUTEROL SULFATE 3 ML: 2.5; .5 SOLUTION RESPIRATORY (INHALATION) at 11:07

## 2021-07-23 RX ADMIN — METHYLPREDNISOLONE ACETATE 80 MG: 80 INJECTION, SUSPENSION INTRA-ARTICULAR; INTRALESIONAL; INTRAMUSCULAR; SOFT TISSUE at 11:07

## 2021-08-02 ENCOUNTER — OFFICE VISIT (OUTPATIENT)
Dept: PRIMARY CARE CLINIC | Facility: CLINIC | Age: 56
End: 2021-08-02
Payer: COMMERCIAL

## 2021-08-02 VITALS
HEART RATE: 84 BPM | OXYGEN SATURATION: 98 % | DIASTOLIC BLOOD PRESSURE: 88 MMHG | RESPIRATION RATE: 18 BRPM | HEIGHT: 59 IN | WEIGHT: 195 LBS | SYSTOLIC BLOOD PRESSURE: 138 MMHG | BODY MASS INDEX: 39.31 KG/M2

## 2021-08-02 DIAGNOSIS — R51.9 ACUTE INTRACTABLE HEADACHE, UNSPECIFIED HEADACHE TYPE: ICD-10-CM

## 2021-08-02 DIAGNOSIS — R53.83 FATIGUE, UNSPECIFIED TYPE: Primary | ICD-10-CM

## 2021-08-02 DIAGNOSIS — J38.3 VOCAL CORD DYSFUNCTION: Primary | ICD-10-CM

## 2021-08-02 DIAGNOSIS — J45.909 ASTHMA, UNSPECIFIED ASTHMA SEVERITY, UNSPECIFIED WHETHER COMPLICATED, UNSPECIFIED WHETHER PERSISTENT: ICD-10-CM

## 2021-08-02 LAB
CTP QC/QA: YES
SARS-COV-2 RDRP RESP QL NAA+PROBE: NEGATIVE

## 2021-08-02 PROCEDURE — 1160F RVW MEDS BY RX/DR IN RCRD: CPT | Mod: CPTII,S$GLB,, | Performed by: INTERNAL MEDICINE

## 2021-08-02 PROCEDURE — 3075F SYST BP GE 130 - 139MM HG: CPT | Mod: CPTII,S$GLB,, | Performed by: INTERNAL MEDICINE

## 2021-08-02 PROCEDURE — 1160F PR REVIEW ALL MEDS BY PRESCRIBER/CLIN PHARMACIST DOCUMENTED: ICD-10-PCS | Mod: CPTII,S$GLB,, | Performed by: INTERNAL MEDICINE

## 2021-08-02 PROCEDURE — 3079F DIAST BP 80-89 MM HG: CPT | Mod: CPTII,S$GLB,, | Performed by: INTERNAL MEDICINE

## 2021-08-02 PROCEDURE — 1159F MED LIST DOCD IN RCRD: CPT | Mod: CPTII,S$GLB,, | Performed by: INTERNAL MEDICINE

## 2021-08-02 PROCEDURE — U0002 COVID-19 LAB TEST NON-CDC: HCPCS | Mod: QW,S$GLB,, | Performed by: INTERNAL MEDICINE

## 2021-08-02 PROCEDURE — 99214 PR OFFICE/OUTPT VISIT, EST, LEVL IV, 30-39 MIN: ICD-10-PCS | Mod: S$GLB,,, | Performed by: INTERNAL MEDICINE

## 2021-08-02 PROCEDURE — 3079F PR MOST RECENT DIASTOLIC BLOOD PRESSURE 80-89 MM HG: ICD-10-PCS | Mod: CPTII,S$GLB,, | Performed by: INTERNAL MEDICINE

## 2021-08-02 PROCEDURE — 3008F PR BODY MASS INDEX (BMI) DOCUMENTED: ICD-10-PCS | Mod: CPTII,S$GLB,, | Performed by: INTERNAL MEDICINE

## 2021-08-02 PROCEDURE — U0002: ICD-10-PCS | Mod: QW,S$GLB,, | Performed by: INTERNAL MEDICINE

## 2021-08-02 PROCEDURE — 99214 OFFICE O/P EST MOD 30 MIN: CPT | Mod: S$GLB,,, | Performed by: INTERNAL MEDICINE

## 2021-08-02 PROCEDURE — 3008F BODY MASS INDEX DOCD: CPT | Mod: CPTII,S$GLB,, | Performed by: INTERNAL MEDICINE

## 2021-08-02 PROCEDURE — 3075F PR MOST RECENT SYSTOLIC BLOOD PRESS GE 130-139MM HG: ICD-10-PCS | Mod: CPTII,S$GLB,, | Performed by: INTERNAL MEDICINE

## 2021-08-02 PROCEDURE — 1159F PR MEDICATION LIST DOCUMENTED IN MEDICAL RECORD: ICD-10-PCS | Mod: CPTII,S$GLB,, | Performed by: INTERNAL MEDICINE

## 2021-08-05 ENCOUNTER — CLINICAL SUPPORT (OUTPATIENT)
Dept: PULMONOLOGY | Facility: CLINIC | Age: 56
End: 2021-08-05
Payer: COMMERCIAL

## 2021-08-05 DIAGNOSIS — J38.3 VOCAL CORD DYSFUNCTION: ICD-10-CM

## 2021-08-05 PROCEDURE — 94726 PULM FUNCT TST PLETHYSMOGRAP: ICD-10-PCS | Mod: S$GLB,,, | Performed by: INTERNAL MEDICINE

## 2021-08-05 PROCEDURE — 94060 PR EVAL OF BRONCHOSPASM: ICD-10-PCS | Mod: S$GLB,,, | Performed by: INTERNAL MEDICINE

## 2021-08-05 PROCEDURE — 94060 EVALUATION OF WHEEZING: CPT | Mod: S$GLB,,, | Performed by: INTERNAL MEDICINE

## 2021-08-05 PROCEDURE — 94726 PLETHYSMOGRAPHY LUNG VOLUMES: CPT | Mod: S$GLB,,, | Performed by: INTERNAL MEDICINE

## 2021-08-18 ENCOUNTER — OFFICE VISIT (OUTPATIENT)
Dept: PRIMARY CARE CLINIC | Facility: CLINIC | Age: 56
End: 2021-08-18
Payer: COMMERCIAL

## 2021-08-18 VITALS
OXYGEN SATURATION: 98 % | HEIGHT: 59 IN | BODY MASS INDEX: 40.6 KG/M2 | WEIGHT: 201.38 LBS | SYSTOLIC BLOOD PRESSURE: 142 MMHG | TEMPERATURE: 98 F | HEART RATE: 79 BPM | DIASTOLIC BLOOD PRESSURE: 88 MMHG

## 2021-08-18 DIAGNOSIS — R49.0 VOICE HOARSENESS: ICD-10-CM

## 2021-08-18 DIAGNOSIS — I10 BENIGN ESSENTIAL HTN: ICD-10-CM

## 2021-08-18 DIAGNOSIS — E03.9 HYPOTHYROIDISM, UNSPECIFIED TYPE: ICD-10-CM

## 2021-08-18 DIAGNOSIS — Z00.00 ROUTINE GENERAL MEDICAL EXAMINATION AT A HEALTH CARE FACILITY: Primary | ICD-10-CM

## 2021-08-18 PROCEDURE — 3008F PR BODY MASS INDEX (BMI) DOCUMENTED: ICD-10-PCS | Mod: CPTII,S$GLB,, | Performed by: INTERNAL MEDICINE

## 2021-08-18 PROCEDURE — 1160F RVW MEDS BY RX/DR IN RCRD: CPT | Mod: CPTII,S$GLB,, | Performed by: INTERNAL MEDICINE

## 2021-08-18 PROCEDURE — 1159F MED LIST DOCD IN RCRD: CPT | Mod: CPTII,S$GLB,, | Performed by: INTERNAL MEDICINE

## 2021-08-18 PROCEDURE — 99396 PREV VISIT EST AGE 40-64: CPT | Mod: S$GLB,,, | Performed by: INTERNAL MEDICINE

## 2021-08-18 PROCEDURE — 1160F PR REVIEW ALL MEDS BY PRESCRIBER/CLIN PHARMACIST DOCUMENTED: ICD-10-PCS | Mod: CPTII,S$GLB,, | Performed by: INTERNAL MEDICINE

## 2021-08-18 PROCEDURE — 3079F PR MOST RECENT DIASTOLIC BLOOD PRESSURE 80-89 MM HG: ICD-10-PCS | Mod: CPTII,S$GLB,, | Performed by: INTERNAL MEDICINE

## 2021-08-18 PROCEDURE — 3077F SYST BP >= 140 MM HG: CPT | Mod: CPTII,S$GLB,, | Performed by: INTERNAL MEDICINE

## 2021-08-18 PROCEDURE — 3077F PR MOST RECENT SYSTOLIC BLOOD PRESSURE >= 140 MM HG: ICD-10-PCS | Mod: CPTII,S$GLB,, | Performed by: INTERNAL MEDICINE

## 2021-08-18 PROCEDURE — 99396 PR PREVENTIVE VISIT,EST,40-64: ICD-10-PCS | Mod: S$GLB,,, | Performed by: INTERNAL MEDICINE

## 2021-08-18 PROCEDURE — 3008F BODY MASS INDEX DOCD: CPT | Mod: CPTII,S$GLB,, | Performed by: INTERNAL MEDICINE

## 2021-08-18 PROCEDURE — 3079F DIAST BP 80-89 MM HG: CPT | Mod: CPTII,S$GLB,, | Performed by: INTERNAL MEDICINE

## 2021-08-18 PROCEDURE — 1159F PR MEDICATION LIST DOCUMENTED IN MEDICAL RECORD: ICD-10-PCS | Mod: CPTII,S$GLB,, | Performed by: INTERNAL MEDICINE

## 2021-08-18 RX ORDER — PREDNISONE 20 MG/1
1 TABLET ORAL DAILY
COMMUNITY
Start: 2021-08-11 | End: 2021-08-18

## 2021-08-18 RX ORDER — BENZONATATE 100 MG/1
1 CAPSULE ORAL EVERY 8 HOURS PRN
COMMUNITY
Start: 2021-08-13 | End: 2022-03-07 | Stop reason: SDUPTHER

## 2021-08-20 LAB
ALBUMIN SERPL-MCNC: 4 G/DL (ref 3.6–5.1)
ALBUMIN/GLOB SERPL: 1.6 (CALC) (ref 1–2.5)
ALP SERPL-CCNC: 51 U/L (ref 37–153)
ALT SERPL-CCNC: 24 U/L (ref 6–29)
AST SERPL-CCNC: 14 U/L (ref 10–35)
BASOPHILS # BLD AUTO: 42 CELLS/UL (ref 0–200)
BASOPHILS NFR BLD AUTO: 0.3 %
BILIRUB SERPL-MCNC: 0.4 MG/DL (ref 0.2–1.2)
BUN SERPL-MCNC: 27 MG/DL (ref 7–25)
BUN/CREAT SERPL: 33 (CALC) (ref 6–22)
CALCIUM SERPL-MCNC: 9.1 MG/DL (ref 8.6–10.4)
CHLORIDE SERPL-SCNC: 103 MMOL/L (ref 98–110)
CHOLEST SERPL-MCNC: 222 MG/DL
CHOLEST/HDLC SERPL: 2.6 (CALC)
CO2 SERPL-SCNC: 26 MMOL/L (ref 20–32)
CREAT SERPL-MCNC: 0.83 MG/DL (ref 0.5–1.05)
EOSINOPHIL # BLD AUTO: 322 CELLS/UL (ref 15–500)
EOSINOPHIL NFR BLD AUTO: 2.3 %
ERYTHROCYTE [DISTWIDTH] IN BLOOD BY AUTOMATED COUNT: 15 % (ref 11–15)
GLOBULIN SER CALC-MCNC: 2.5 G/DL (CALC) (ref 1.9–3.7)
GLUCOSE SERPL-MCNC: 111 MG/DL (ref 65–99)
HBA1C MFR BLD: 6.8 % OF TOTAL HGB
HCT VFR BLD AUTO: 40.5 % (ref 35–45)
HCV AB S/CO SERPL IA: 0.01
HCV AB SERPL QL IA: NORMAL
HDLC SERPL-MCNC: 85 MG/DL
HGB BLD-MCNC: 12.9 G/DL (ref 11.7–15.5)
HIV 1+2 AB+HIV1 P24 AG SERPL QL IA: NORMAL
LDLC SERPL CALC-MCNC: 116 MG/DL (CALC)
LYMPHOCYTES # BLD AUTO: 4018 CELLS/UL (ref 850–3900)
LYMPHOCYTES NFR BLD AUTO: 28.7 %
MCH RBC QN AUTO: 25.2 PG (ref 27–33)
MCHC RBC AUTO-ENTMCNC: 31.9 G/DL (ref 32–36)
MCV RBC AUTO: 79.3 FL (ref 80–100)
MONOCYTES # BLD AUTO: 952 CELLS/UL (ref 200–950)
MONOCYTES NFR BLD AUTO: 6.8 %
NEUTROPHILS # BLD AUTO: 8666 CELLS/UL (ref 1500–7800)
NEUTROPHILS NFR BLD AUTO: 61.9 %
NONHDLC SERPL-MCNC: 137 MG/DL (CALC)
PLATELET # BLD AUTO: 354 THOUSAND/UL (ref 140–400)
PMV BLD REES-ECKER: 10.6 FL (ref 7.5–12.5)
POTASSIUM SERPL-SCNC: 4.2 MMOL/L (ref 3.5–5.3)
PROT SERPL-MCNC: 6.5 G/DL (ref 6.1–8.1)
RBC # BLD AUTO: 5.11 MILLION/UL (ref 3.8–5.1)
SODIUM SERPL-SCNC: 141 MMOL/L (ref 135–146)
TRIGL SERPL-MCNC: 100 MG/DL
TSH SERPL-ACNC: 17.67 MIU/L
WBC # BLD AUTO: 14 THOUSAND/UL (ref 3.8–10.8)

## 2021-08-27 DIAGNOSIS — R71.8 MICROCYTOSIS: ICD-10-CM

## 2021-08-27 DIAGNOSIS — E11.9 TYPE 2 DIABETES MELLITUS WITHOUT COMPLICATION, WITHOUT LONG-TERM CURRENT USE OF INSULIN: Primary | ICD-10-CM

## 2021-08-27 DIAGNOSIS — E78.5 HYPERLIPIDEMIA, UNSPECIFIED HYPERLIPIDEMIA TYPE: ICD-10-CM

## 2021-08-27 RX ORDER — ATORVASTATIN CALCIUM 20 MG/1
20 TABLET, FILM COATED ORAL DAILY
Qty: 90 TABLET | Refills: 3 | Status: SHIPPED | OUTPATIENT
Start: 2021-08-27 | End: 2022-08-15

## 2021-08-27 RX ORDER — METFORMIN HYDROCHLORIDE 500 MG/1
500 TABLET ORAL 2 TIMES DAILY WITH MEALS
Qty: 180 TABLET | Refills: 3 | Status: SHIPPED | OUTPATIENT
Start: 2021-08-27 | End: 2022-08-10

## 2021-09-22 DIAGNOSIS — N76.0 VAGINITIS AND VULVOVAGINITIS: Primary | ICD-10-CM

## 2021-09-22 RX ORDER — FLUCONAZOLE 150 MG/1
150 TABLET ORAL EVERY OTHER DAY
Qty: 2 TABLET | Refills: 0 | Status: SHIPPED | OUTPATIENT
Start: 2021-09-22 | End: 2021-09-25

## 2021-09-22 RX ORDER — CLOTRIMAZOLE AND BETAMETHASONE DIPROPIONATE 10; .64 MG/G; MG/G
CREAM TOPICAL 2 TIMES DAILY
Qty: 1 TUBE | Refills: 1 | Status: SHIPPED | OUTPATIENT
Start: 2021-09-22 | End: 2021-10-06

## 2022-02-08 ENCOUNTER — CLINICAL SUPPORT (OUTPATIENT)
Dept: PRIMARY CARE CLINIC | Facility: CLINIC | Age: 57
End: 2022-02-08
Payer: COMMERCIAL

## 2022-02-08 DIAGNOSIS — Z51.6 DESENSITIZATION TO ALLERGY SHOT: Primary | ICD-10-CM

## 2022-02-08 NOTE — PROGRESS NOTES
Pt came in today to get weekly allergy injections... Purple Vial #1 Conc: 1:1,000,000 V/V Exp 01/2023 0.20cc given in the Upper Left Arm;  Purple Vial #2  Conc: 1:1,000,000 V/V Exp 01/2023  0.20cc given in the Lower Left Arm;  Purple Vial #3  Conc: 1:1,000,000 V/V  Exp 01/2023 0.20cc given in the Upper Right Arm;  Purple Vial #4 Conc: 1:1,000,000 V/V Exp 01/2023 0.20cc given in the Lower Right Arm.. Pt had no reactions to injections and tolerated well...pt received 0.20cc today.  Pt next date to receive injection will be 02/15/2022.  Pt understood if any reaction occur to contact MD office, go to nearest ER or call 911.

## 2022-02-15 ENCOUNTER — CLINICAL SUPPORT (OUTPATIENT)
Dept: PRIMARY CARE CLINIC | Facility: CLINIC | Age: 57
End: 2022-02-15
Payer: COMMERCIAL

## 2022-02-16 NOTE — PROGRESS NOTES
Pt came in today to get weekly allergy injections... Purple Vial #1 Conc: 1:1,000,000 V/V Exp 01/2023 0.30cc given in the Upper Left Arm;  Purple Vial #2  Conc: 1:1,000,000 V/V Exp 01/2023  0.30cc given in the Lower Left Arm;  Purple Vial #3  Conc: 1:1,000,000 V/V  Exp 01/2023 0.30cc given in the Upper Right Arm;  Purple Vial #4 Conc: 1:1,000,000 V/V Exp 01/2023 0.30cc given in the Lower Right Arm.. Pt had no reactions to injections and tolerated well...pt received 0.30cc today.  Pt next date to receive injection will be 02/22/2022.  Pt understood if any reaction occur to contact MD office, go to nearest ER or call 911.

## 2022-02-22 ENCOUNTER — CLINICAL SUPPORT (OUTPATIENT)
Dept: PRIMARY CARE CLINIC | Facility: CLINIC | Age: 57
End: 2022-02-22
Payer: COMMERCIAL

## 2022-02-22 NOTE — PROGRESS NOTES
Pt came in today to get weekly allergy injections... Purple Vial #1 Conc: 1:1,000,000 V/V Exp 01/2023 0.40cc given in the Upper Left Arm;  Purple Vial #2  Conc: 1:1,000,000 V/V Exp 01/2023  0.40cc given in the Lower Left Arm;  Purple Vial #3  Conc: 1:1,000,000 V/V  Exp 01/2023 0.40cc given in the Upper Right Arm;  Purple Vial #4 Conc: 1:1,000,000 V/V Exp 01/2023 0.40cc given in the Lower Right Arm.. Pt had no reactions to injections and tolerated well...pt received 0.40cc today.  Pt next date to receive injection will be 03/01/2022.  Pt understood if any reaction occur to contact MD office, go to nearest ER or call 911.

## 2022-02-28 ENCOUNTER — TELEPHONE (OUTPATIENT)
Dept: PRIMARY CARE CLINIC | Facility: CLINIC | Age: 57
End: 2022-02-28

## 2022-02-28 NOTE — TELEPHONE ENCOUNTER
S/w patient and advised per MD to get BP checked, patient is coming in 03/01/22 for allergy injections and will have it done at that time.

## 2022-03-01 ENCOUNTER — CLINICAL SUPPORT (OUTPATIENT)
Dept: PRIMARY CARE CLINIC | Facility: CLINIC | Age: 57
End: 2022-03-01
Payer: COMMERCIAL

## 2022-03-01 DIAGNOSIS — Z51.6 DESENSITIZATION TO ALLERGY SHOT: Primary | ICD-10-CM

## 2022-03-01 NOTE — PROGRESS NOTES
Pt came in today to get weekly allergy injections... Purple Vial #1 Conc: 1:1,000,000 V/V Exp 01/2023 0.50cc given in the Upper Left Arm;  Purple Vial #2  Conc: 1:1,000,000 V/V Exp 01/2023  0.50cc given in the Lower Left Arm;  Purple Vial #3  Conc: 1:1,000,000 V/V  Exp 01/2023 0.50cc given in the Upper Right Arm;  Purple Vial #4 Conc: 1:1,000,000 V/V Exp 01/2023 0.50cc given in the Lower Right Arm.. Pt had no reactions to injections and tolerated well...pt received 0.50cc today.  Pt completed Purple Vial set of allergy injections today.  Pt next date to receive injection will be 03/08/2022.  Pt understood if any reaction occur to contact MD office, go to nearest ER or call 911.

## 2022-03-07 ENCOUNTER — OFFICE VISIT (OUTPATIENT)
Dept: PRIMARY CARE CLINIC | Facility: CLINIC | Age: 57
End: 2022-03-07
Payer: COMMERCIAL

## 2022-03-07 VITALS
HEIGHT: 59 IN | RESPIRATION RATE: 16 BRPM | TEMPERATURE: 97 F | HEART RATE: 78 BPM | BODY MASS INDEX: 39.07 KG/M2 | OXYGEN SATURATION: 98 % | SYSTOLIC BLOOD PRESSURE: 150 MMHG | WEIGHT: 193.81 LBS | DIASTOLIC BLOOD PRESSURE: 94 MMHG

## 2022-03-07 DIAGNOSIS — R09.82 POST-NASAL DRIP: Primary | ICD-10-CM

## 2022-03-07 DIAGNOSIS — I10 BENIGN ESSENTIAL HTN: ICD-10-CM

## 2022-03-07 DIAGNOSIS — B37.31 CANDIDA VAGINITIS: ICD-10-CM

## 2022-03-07 DIAGNOSIS — E11.9 TYPE 2 DIABETES MELLITUS WITHOUT COMPLICATION, WITHOUT LONG-TERM CURRENT USE OF INSULIN: ICD-10-CM

## 2022-03-07 DIAGNOSIS — Z00.00 ROUTINE GENERAL MEDICAL EXAMINATION AT A HEALTH CARE FACILITY: ICD-10-CM

## 2022-03-07 DIAGNOSIS — Z23 NEED FOR 23-POLYVALENT PNEUMOCOCCAL POLYSACCHARIDE VACCINE: ICD-10-CM

## 2022-03-07 PROCEDURE — 3008F PR BODY MASS INDEX (BMI) DOCUMENTED: ICD-10-PCS | Mod: CPTII,S$GLB,, | Performed by: INTERNAL MEDICINE

## 2022-03-07 PROCEDURE — 1159F PR MEDICATION LIST DOCUMENTED IN MEDICAL RECORD: ICD-10-PCS | Mod: CPTII,S$GLB,, | Performed by: INTERNAL MEDICINE

## 2022-03-07 PROCEDURE — 99396 PR PREVENTIVE VISIT,EST,40-64: ICD-10-PCS | Mod: S$GLB,,, | Performed by: INTERNAL MEDICINE

## 2022-03-07 PROCEDURE — 3077F PR MOST RECENT SYSTOLIC BLOOD PRESSURE >= 140 MM HG: ICD-10-PCS | Mod: CPTII,S$GLB,, | Performed by: INTERNAL MEDICINE

## 2022-03-07 PROCEDURE — 1160F RVW MEDS BY RX/DR IN RCRD: CPT | Mod: CPTII,S$GLB,, | Performed by: INTERNAL MEDICINE

## 2022-03-07 PROCEDURE — 1160F PR REVIEW ALL MEDS BY PRESCRIBER/CLIN PHARMACIST DOCUMENTED: ICD-10-PCS | Mod: CPTII,S$GLB,, | Performed by: INTERNAL MEDICINE

## 2022-03-07 PROCEDURE — 3080F PR MOST RECENT DIASTOLIC BLOOD PRESSURE >= 90 MM HG: ICD-10-PCS | Mod: CPTII,S$GLB,, | Performed by: INTERNAL MEDICINE

## 2022-03-07 PROCEDURE — 3008F BODY MASS INDEX DOCD: CPT | Mod: CPTII,S$GLB,, | Performed by: INTERNAL MEDICINE

## 2022-03-07 PROCEDURE — 3077F SYST BP >= 140 MM HG: CPT | Mod: CPTII,S$GLB,, | Performed by: INTERNAL MEDICINE

## 2022-03-07 PROCEDURE — 3080F DIAST BP >= 90 MM HG: CPT | Mod: CPTII,S$GLB,, | Performed by: INTERNAL MEDICINE

## 2022-03-07 PROCEDURE — 1159F MED LIST DOCD IN RCRD: CPT | Mod: CPTII,S$GLB,, | Performed by: INTERNAL MEDICINE

## 2022-03-07 PROCEDURE — 99396 PREV VISIT EST AGE 40-64: CPT | Mod: S$GLB,,, | Performed by: INTERNAL MEDICINE

## 2022-03-07 RX ORDER — LOSARTAN POTASSIUM 50 MG/1
50 TABLET ORAL DAILY
Qty: 90 TABLET | Refills: 3 | Status: SHIPPED | OUTPATIENT
Start: 2022-03-07 | End: 2023-02-13

## 2022-03-07 RX ORDER — PNEUMOCOCCAL VACCINE POLYVALENT 25; 25; 25; 25; 25; 25; 25; 25; 25; 25; 25; 25; 25; 25; 25; 25; 25; 25; 25; 25; 25; 25; 25 UG/.5ML; UG/.5ML; UG/.5ML; UG/.5ML; UG/.5ML; UG/.5ML; UG/.5ML; UG/.5ML; UG/.5ML; UG/.5ML; UG/.5ML; UG/.5ML; UG/.5ML; UG/.5ML; UG/.5ML; UG/.5ML; UG/.5ML; UG/.5ML; UG/.5ML; UG/.5ML; UG/.5ML; UG/.5ML; UG/.5ML
0.5 INJECTION, SOLUTION INTRAMUSCULAR; SUBCUTANEOUS ONCE
Qty: 0.5 ML | Refills: 0 | Status: SHIPPED | OUTPATIENT
Start: 2022-03-07 | End: 2022-03-07

## 2022-03-07 RX ORDER — ORAL SEMAGLUTIDE 7 MG/1
7 TABLET ORAL DAILY
Qty: 90 TABLET | Refills: 3 | Status: SHIPPED | OUTPATIENT
Start: 2022-03-07 | End: 2022-11-07

## 2022-03-07 RX ORDER — BENZONATATE 100 MG/1
100 CAPSULE ORAL EVERY 8 HOURS PRN
Qty: 30 CAPSULE | Refills: 0 | Status: SHIPPED | OUTPATIENT
Start: 2022-03-07 | End: 2022-08-15

## 2022-03-07 RX ORDER — FLUCONAZOLE 150 MG/1
150 TABLET ORAL DAILY
Qty: 1 TABLET | Refills: 0 | Status: SHIPPED | OUTPATIENT
Start: 2022-03-07 | End: 2022-03-08

## 2022-03-07 NOTE — PROGRESS NOTES
Subjective:      Patient ID: Adri Thomason is a 56 y.o. female.    Chief Complaint: Follow-up (F/u)    Patient presented today for annual wellness exam.     Patient has history of asthma and the symptoms seem better controlled with Trelegy and ProAir, DuoNeb.     Patient BP has been running high.  Home Bp are running 130-140s/80. Patient denies any Chest pain + shortness of breath, no ankle swelling.     Patient presented today with complain of runny nose + nasal congestion + post nasal drip. Patient is taking Singulair on and off as needed.     Patient reports having Vaginal Fungal infection. Patient has whitish discharge.        Review of Systems   Constitutional: Negative for chills, diaphoresis, fever, malaise/fatigue and weight loss (9 lb weight loss).   HENT: Positive for congestion and sore throat. Negative for ear pain, sinus pain and tinnitus.         Loss of voice   Eyes: Negative for blurred vision and photophobia.   Respiratory: Negative for cough, hemoptysis, shortness of breath and wheezing.    Cardiovascular: Negative for chest pain, palpitations, orthopnea, leg swelling and PND.   Gastrointestinal: Negative for abdominal pain, blood in stool, constipation, diarrhea, heartburn, melena, nausea and vomiting.   Genitourinary: Negative for dysuria, frequency and urgency.   Musculoskeletal: Negative for back pain, myalgias and neck pain.   Skin: Negative for rash.   Neurological: Negative for dizziness, tremors, seizures, loss of consciousness and weakness.   Endo/Heme/Allergies: Negative for polydipsia.   Psychiatric/Behavioral: Negative for depression and hallucinations. The patient does not have insomnia.      Objective:     Physical Exam  Constitutional:       General: She is not in acute distress.     Appearance: She is not diaphoretic.   Neck:      Thyroid: No thyromegaly.   Cardiovascular:      Rate and Rhythm: Normal rate and regular rhythm.      Heart sounds: Normal heart sounds. No murmur  heard.  Pulmonary:      Effort: Pulmonary effort is normal. No respiratory distress.      Breath sounds: Normal breath sounds. No wheezing.   Chest:      Chest wall: No tenderness.   Abdominal:      General: Bowel sounds are normal. There is no distension.      Palpations: Abdomen is soft.      Tenderness: There is no abdominal tenderness.   Musculoskeletal:         General: No tenderness.   Lymphadenopathy:      Cervical: No cervical adenopathy.   Neurological:      Mental Status: She is alert and oriented to person, place, and time.   Psychiatric:         Behavior: Behavior normal.         Thought Content: Thought content normal.         Judgment: Judgment normal.       Assessment:       ICD-10-CM ICD-9-CM   1. Post-nasal drip  R09.82 784.91   2. Benign essential HTN  I10 401.1   3. Type 2 diabetes mellitus without complication, without long-term current use of insulin  E11.9 250.00   4. Routine general medical examination at a health care facility  Z00.00 V70.0   5. Need for 23-polyvalent pneumococcal polysaccharide vaccine  Z23 V03.82   6. Candida vaginitis  B37.3 112.1       Plan:     Patient is here for Annual wellness exam.  Will order labs  Patient is adherent to diet and is exercising regularly.  Patient has lost 9 lb in last few months  Patient is encouraged to continue.   Patient last mammogram was in May 2021 and was normal.  Patient blood pressures are running high + reports compliance with metformin will add losartan  Advised patient come to clinic in a week to get blood pressures checked  Patient has some postnasal drip advised patient to take Singulair + Flonase  Patient is not very adherent with but folded for diabetes.  Will give Rybelsus sample.   (we are out of samples) will try to arrange.  Medicine should help with better blood sugar control and weight loss  Will order pneumococcal vaccine  Will use Diflucan for Candida vaginitis      Medication List with Changes/Refills   New Medications     FLUCONAZOLE (DIFLUCAN) 150 MG TAB    Take 1 tablet (150 mg total) by mouth once daily. for 1 day    LOSARTAN (COZAAR) 50 MG TABLET    Take 1 tablet (50 mg total) by mouth once daily.    PNEUMOCOCCAL 23-EDISON PS (PNEUMOVAX-23) 25 MCG/0.5 ML VACCINE    Inject 0.5 mLs into the muscle once. for 1 dose    SEMAGLUTIDE (RYBELSUS) 7 MG TABLET    Take 1 tablet (7 mg total) by mouth once daily.   Current Medications    ALBUTEROL-IPRATROPIUM (DUO-NEB) 2.5 MG-0.5 MG/3 ML NEBULIZER SOLUTION    INHALE 1 VIAL VIA NEBULIZER 4 TIMES A DAY AS NEEDED    ATORVASTATIN (LIPITOR) 20 MG TABLET    Take 1 tablet (20 mg total) by mouth once daily.    CLOBETASOL 0.05% (TEMOVATE) 0.05 % OINT    Apply topically 2 (two) times daily.    EPINEPHRINE (EPIPEN 2-AN) 0.3 MG/0.3 ML ATIN    EpiPen 2-An 0.3 mg/0.3 mL injection, auto-injector    FLUTICASONE PROPIONATE (FLONASE) 50 MCG/ACTUATION NASAL SPRAY    2 sprays by Each Nare route every evening.    FLUTICASONE-UMECLIDIN-VILANTER (TRELEGY ELLIPTA) 100-62.5-25 MCG DSDV    TAKE 1 PUFF BY MOUTH EVERY DAY    HYDROCHLOROTHIAZIDE (HYDRODIURIL) 12.5 MG TAB    TAKE 1 TABLET BY MOUTH EVERY DAY    LEVOTHYROXINE (SYNTHROID) 150 MCG TABLET    TAKE 1 TABLET BY MOUTH EVERY DAY    METFORMIN (GLUCOPHAGE) 500 MG TABLET    Take 1 tablet (500 mg total) by mouth 2 (two) times daily with meals.    MONTELUKAST (SINGULAIR) 10 MG TABLET    TAKE 1 TABLET BY MOUTH EVERY DAY IN THE EVENING    VENTOLIN HFA 90 MCG/ACTUATION INHALER    TAKE 2 PUFFS BY MOUTH EVERY 4 HOURS AS NEEDED   Changed and/or Refilled Medications    Modified Medication Previous Medication    BENZONATATE (TESSALON) 100 MG CAPSULE benzonatate (TESSALON) 100 MG capsule       Take 1 capsule (100 mg total) by mouth every 8 (eight) hours as needed for Cough.    Take 1 capsule by mouth every 8 (eight) hours as needed.   Discontinued Medications    ESTRADIOL (ESTRACE) 0.01 % (0.1 MG/GRAM) VAGINAL CREAM    Place 2 g vaginally once daily.

## 2022-03-08 ENCOUNTER — CLINICAL SUPPORT (OUTPATIENT)
Dept: PRIMARY CARE CLINIC | Facility: CLINIC | Age: 57
End: 2022-03-08
Payer: COMMERCIAL

## 2022-03-09 ENCOUNTER — PATIENT MESSAGE (OUTPATIENT)
Dept: FAMILY MEDICINE | Facility: CLINIC | Age: 57
End: 2022-03-09
Payer: COMMERCIAL

## 2022-03-09 DIAGNOSIS — R74.8 ELEVATED LIVER ENZYMES: ICD-10-CM

## 2022-03-09 DIAGNOSIS — R71.8 MICROCYTOSIS: Primary | ICD-10-CM

## 2022-03-09 DIAGNOSIS — E03.9 HYPOTHYROIDISM, UNSPECIFIED TYPE: ICD-10-CM

## 2022-03-09 LAB
ALBUMIN SERPL-MCNC: 4.4 G/DL (ref 3.6–5.1)
ALBUMIN/CREAT UR: 3 MCG/MG CREAT
ALBUMIN/GLOB SERPL: 1.6 (CALC) (ref 1–2.5)
ALP SERPL-CCNC: 122 U/L (ref 37–153)
ALT SERPL-CCNC: 85 U/L (ref 6–29)
AST SERPL-CCNC: 46 U/L (ref 10–35)
BASOPHILS # BLD AUTO: 33 CELLS/UL (ref 0–200)
BASOPHILS NFR BLD AUTO: 0.4 %
BILIRUB SERPL-MCNC: 0.7 MG/DL (ref 0.2–1.2)
BUN SERPL-MCNC: 19 MG/DL (ref 7–25)
BUN/CREAT SERPL: ABNORMAL (CALC) (ref 6–22)
CALCIUM SERPL-MCNC: 9.8 MG/DL (ref 8.6–10.4)
CHLORIDE SERPL-SCNC: 100 MMOL/L (ref 98–110)
CHOLEST SERPL-MCNC: 153 MG/DL
CHOLEST/HDLC SERPL: 2.6 (CALC)
CO2 SERPL-SCNC: 25 MMOL/L (ref 20–32)
CREAT SERPL-MCNC: 0.62 MG/DL (ref 0.5–1.05)
CREAT UR-MCNC: 164 MG/DL (ref 20–275)
EOSINOPHIL # BLD AUTO: 180 CELLS/UL (ref 15–500)
EOSINOPHIL NFR BLD AUTO: 2.2 %
ERYTHROCYTE [DISTWIDTH] IN BLOOD BY AUTOMATED COUNT: 14.8 % (ref 11–15)
GLOBULIN SER CALC-MCNC: 2.8 G/DL (CALC) (ref 1.9–3.7)
GLUCOSE SERPL-MCNC: 83 MG/DL (ref 65–99)
HBA1C MFR BLD: 6.6 % OF TOTAL HGB
HCT VFR BLD AUTO: 43.2 % (ref 35–45)
HDLC SERPL-MCNC: 58 MG/DL
HGB BLD-MCNC: 13.5 G/DL (ref 11.7–15.5)
LDLC SERPL CALC-MCNC: 80 MG/DL (CALC)
LYMPHOCYTES # BLD AUTO: 2378 CELLS/UL (ref 850–3900)
LYMPHOCYTES NFR BLD AUTO: 29 %
MCH RBC QN AUTO: 23.9 PG (ref 27–33)
MCHC RBC AUTO-ENTMCNC: 31.3 G/DL (ref 32–36)
MCV RBC AUTO: 76.3 FL (ref 80–100)
MICROALBUMIN UR-MCNC: 0.5 MG/DL
MONOCYTES # BLD AUTO: 607 CELLS/UL (ref 200–950)
MONOCYTES NFR BLD AUTO: 7.4 %
NEUTROPHILS # BLD AUTO: 5002 CELLS/UL (ref 1500–7800)
NEUTROPHILS NFR BLD AUTO: 61 %
NONHDLC SERPL-MCNC: 95 MG/DL (CALC)
PLATELET # BLD AUTO: 345 THOUSAND/UL (ref 140–400)
PMV BLD REES-ECKER: 11.5 FL (ref 7.5–12.5)
POTASSIUM SERPL-SCNC: 4.3 MMOL/L (ref 3.5–5.3)
PROT SERPL-MCNC: 7.2 G/DL (ref 6.1–8.1)
RBC # BLD AUTO: 5.66 MILLION/UL (ref 3.8–5.1)
SODIUM SERPL-SCNC: 140 MMOL/L (ref 135–146)
TRIGL SERPL-MCNC: 69 MG/DL
TSH SERPL-ACNC: 0.02 MIU/L
WBC # BLD AUTO: 8.2 THOUSAND/UL (ref 3.8–10.8)

## 2022-03-09 RX ORDER — LEVOTHYROXINE SODIUM 100 UG/1
100 TABLET ORAL
Qty: 30 TABLET | Refills: 11 | Status: SHIPPED | OUTPATIENT
Start: 2022-03-09 | End: 2023-05-08

## 2022-03-10 ENCOUNTER — PATIENT MESSAGE (OUTPATIENT)
Dept: PRIMARY CARE CLINIC | Facility: CLINIC | Age: 57
End: 2022-03-10
Payer: COMMERCIAL

## 2022-03-10 NOTE — PROGRESS NOTES
Pt came in today to get weekly allergy injections... Pink Vial  #1 Conc: 1:100,000 V/V Exp 01/2023 0.10cc given in the Upper Left Arm;  Pink Vial #2  Conc: 1:100,000 V/V Exp 01/2023  0.10cc given in the Lower Left Arm;  Pink Vial #3  Conc: 1:100,000 V/V  Exp 01/2023 0.10cc given in the Upper Right Arm;  Pink Vial #4 Conc: 1:100,000 V/V Exp 01/2023 0.10cc given in the Lower Right Arm.. Pt had no reactions to injections and tolerated well...pt received 0.10cc today.  Pt next date to receive injection will be 03/15/2022.  Pt understood if any reaction occur to contact MD office, go to nearest ER or call 911.

## 2022-03-15 ENCOUNTER — CLINICAL SUPPORT (OUTPATIENT)
Dept: PRIMARY CARE CLINIC | Facility: CLINIC | Age: 57
End: 2022-03-15
Payer: COMMERCIAL

## 2022-03-15 VITALS — SYSTOLIC BLOOD PRESSURE: 122 MMHG | HEART RATE: 87 BPM | DIASTOLIC BLOOD PRESSURE: 82 MMHG

## 2022-03-15 DIAGNOSIS — Z51.6 DESENSITIZATION TO ALLERGY SHOT: Primary | ICD-10-CM

## 2022-03-15 NOTE — PROGRESS NOTES
Pt came in today to get weekly allergy injections... Pink Vial  #1 Conc: 1:100,000 V/V Exp 01/2023 0.20cc given in the Upper Left Arm;  Pink Vial #2  Conc: 1:100,000 V/V Exp 01/2023  0.20cc given in the Lower Left Arm;  Pink Vial #3  Conc: 1:100,000 V/V  Exp 01/2023 0.20cc given in the Upper Right Arm;  Pink Vial #4 Conc: 1:100,000 V/V Exp 01/2023 0.20cc given in the Lower Right Arm.. Pt had no reactions to injections and tolerated well...pt received 0.20cc today.  Pt next date to receive injection will be 03/22/2022.  Pt understood if any reaction occur to contact MD office, go to nearest ER or call 911.

## 2022-03-23 ENCOUNTER — CLINICAL SUPPORT (OUTPATIENT)
Dept: PRIMARY CARE CLINIC | Facility: CLINIC | Age: 57
End: 2022-03-23
Payer: COMMERCIAL

## 2022-03-23 DIAGNOSIS — Z51.6 DESENSITIZATION TO ALLERGY SHOT: Primary | ICD-10-CM

## 2022-03-23 NOTE — PROGRESS NOTES
Pt came in today to get weekly allergy injections... Pink Vial  #1 Conc: 1:100,000 V/V Exp 01/2023 0.30cc given in the Upper Left Arm;  Pink Vial #2  Conc: 1:100,000 V/V Exp 01/2023  0.30cc given in the Lower Left Arm;  Pink Vial #3  Conc: 1:100,000 V/V  Exp 01/2023 0.30cc given in the Upper Right Arm;  Pink Vial #4 Conc: 1:100,000 V/V Exp 01/2023 0.30cc given in the Lower Right Arm.. Pt had no reactions to injections and tolerated well...pt received 0.30cc today.  Pt next date to receive injection will be 03/29/2022.  Pt understood if any reaction occur to contact MD office, go to nearest ER or call 911.

## 2022-03-29 ENCOUNTER — CLINICAL SUPPORT (OUTPATIENT)
Dept: PRIMARY CARE CLINIC | Facility: CLINIC | Age: 57
End: 2022-03-29
Payer: COMMERCIAL

## 2022-03-29 DIAGNOSIS — Z51.6 DESENSITIZATION TO ALLERGY SHOT: Primary | ICD-10-CM

## 2022-03-29 NOTE — PROGRESS NOTES
Pt came in today to get weekly allergy injections... Pink Vial  #1 Conc: 1:100,000 V/V Exp 01/2023 0.40cc given in the Upper Left Arm;  Pink Vial #2  Conc: 1:100,000 V/V Exp 01/2023  0.40cc given in the Lower Left Arm;  Pink Vial #3  Conc: 1:100,000 V/V  Exp 01/2023 0.40cc given in the Upper Right Arm;  Pink Vial #4 Conc: 1:100,000 V/V Exp 01/2023 0.40cc given in the Lower Right Arm.. Pt had no reactions to injections and tolerated well...pt received 0.40cc today.  Pt next date to receive injection will be 04/05/2022.  Pt understood if any reaction occur to contact MD office, go to nearest ER or call 911.

## 2022-04-06 ENCOUNTER — CLINICAL SUPPORT (OUTPATIENT)
Dept: PRIMARY CARE CLINIC | Facility: CLINIC | Age: 57
End: 2022-04-06
Payer: COMMERCIAL

## 2022-04-06 DIAGNOSIS — Z51.6 DESENSITIZATION TO ALLERGY SHOT: Primary | ICD-10-CM

## 2022-04-06 NOTE — PROGRESS NOTES
Pt came in today to get weekly allergy injections... Pink Vial  #1 Conc: 1:100,000 V/V Exp 01/2023 0.50cc given in the Upper Left Arm;  Pink Vial #2  Conc: 1:100,000 V/V Exp 01/2023  0.50cc given in the Lower Left Arm;  Pink Vial #3  Conc: 1:100,000 V/V  Exp 01/2023 0.50cc given in the Upper Right Arm;  Pink Vial #4 Conc: 1:100,000 V/V Exp 01/2023 0.50cc given in the Lower Right Arm.. Pt had no reactions to injections and tolerated well...pt received 0.50cc today.  Pt completed [place] Vial Sets of allergy injections today.  Pt next date to receive injection will be 04/12/2022.  Pt understood if any reaction occur to contact MD office, go to nearest ER or call 911.

## 2022-04-07 LAB
FERRITIN SERPL-MCNC: 10 NG/ML (ref 16–232)
HBV CORE AB SERPL QL IA: NORMAL
HBV SURFACE AB SER QL IA: NORMAL
HBV SURFACE AG SERPL QL IA: NORMAL
IRON SATN MFR SERPL: 11 % (CALC) (ref 16–45)
IRON SERPL-MCNC: 45 MCG/DL (ref 45–160)
TIBC SERPL-MCNC: 421 MCG/DL (CALC) (ref 250–450)

## 2022-04-11 ENCOUNTER — CLINICAL SUPPORT (OUTPATIENT)
Dept: PRIMARY CARE CLINIC | Facility: CLINIC | Age: 57
End: 2022-04-11
Payer: COMMERCIAL

## 2022-04-11 ENCOUNTER — PATIENT MESSAGE (OUTPATIENT)
Dept: FAMILY MEDICINE | Facility: CLINIC | Age: 57
End: 2022-04-11
Payer: COMMERCIAL

## 2022-04-11 ENCOUNTER — TELEPHONE (OUTPATIENT)
Dept: PRIMARY CARE CLINIC | Facility: CLINIC | Age: 57
End: 2022-04-11

## 2022-04-11 DIAGNOSIS — Z51.6 DESENSITIZATION TO ALLERGY SHOT: Primary | ICD-10-CM

## 2022-04-11 RX ORDER — NYSTATIN 100000 [USP'U]/ML
4 SUSPENSION ORAL 4 TIMES DAILY
Qty: 160 ML | Refills: 0 | Status: SHIPPED | OUTPATIENT
Start: 2022-04-11 | End: 2022-04-21

## 2022-04-11 NOTE — PROGRESS NOTES
Pt came in today to get weekly allergy injections... Grey Vial  #1 Conc: 1:10,000 V/V Exp 01/2023 0.10cc given in the Upper Left Arm;  Grey Vial #2  Conc: 1:10,000 V/V Exp 01/2023  0.10cc given in the Lower Left Arm;  Grey Vial #3  Conc: 1:10,000 V/V  Exp 01/2023 0.10cc given in the Upper Right Arm;  Grey Vial #4 Conc: 1:10,000 V/V Exp 01/2023 0.10cc given in the Lower Right Arm.. Pt had no reactions to injections and tolerated well...pt received 0.10cc today.    Pt next date to receive injection will be 04/19/2022.  Pt understood if any reaction occur to contact MD office, go to nearest ER or call 911.

## 2022-04-19 ENCOUNTER — CLINICAL SUPPORT (OUTPATIENT)
Dept: PRIMARY CARE CLINIC | Facility: CLINIC | Age: 57
End: 2022-04-19
Payer: COMMERCIAL

## 2022-04-19 DIAGNOSIS — Z51.6 DESENSITIZATION TO ALLERGY SHOT: Primary | ICD-10-CM

## 2022-04-19 NOTE — PROGRESS NOTES
Pt came in today to get weekly allergy injections... Grey Vial  #1 Conc: 1:10,000 V/V Exp 01/2023 0.20cc given in the Upper Left Arm;  Grey Vial #2  Conc: 1:10,000 V/V Exp 01/2023  0.20cc given in the Lower Left Arm;  Grey Vial #3  Conc: 1:10,000 V/V  Exp 01/2023 0.20cc given in the Upper Right Arm;  Grey Vial #4 Conc: 1:10,000 V/V Exp 01/2023 0.20cc given in the Lower Right Arm.. Pt had no reactions to injections and tolerated well...pt received 0.20cc today.  Pt next date to receive injection will be 04/26/2022.  Pt understood if any reaction occur to contact MD office, go to nearest ER or call 911.

## 2022-04-25 DIAGNOSIS — R09.82 POST-NASAL DRIP: Primary | ICD-10-CM

## 2022-04-25 RX ORDER — PREDNISONE 20 MG/1
20 TABLET ORAL DAILY
Qty: 7 TABLET | Refills: 0 | Status: SHIPPED | OUTPATIENT
Start: 2022-04-25 | End: 2022-05-02

## 2022-04-25 RX ORDER — BETAMETHASONE SODIUM PHOSPHATE AND BETAMETHASONE ACETATE 3; 3 MG/ML; MG/ML
6 INJECTION, SUSPENSION INTRA-ARTICULAR; INTRALESIONAL; INTRAMUSCULAR; SOFT TISSUE
Status: DISCONTINUED | OUTPATIENT
Start: 2022-04-25 | End: 2022-08-15

## 2022-04-25 NOTE — PROGRESS NOTES
Patient with h/o Asthma presented today with complains of persistent Cough x 1 week.   Patient denies any wheezing or shortness of breath, No fever or chills.   Patient is taking nebulizer treatment 2 to 3 times a day with little help  Patient is also taking trilogy with little help  Patient also has a runny nose, nasal congestion, postnasal drip  Patient is taking Flonase with little help.  Will Use Celestone and Prednisone.

## 2022-04-27 ENCOUNTER — CLINICAL SUPPORT (OUTPATIENT)
Dept: PRIMARY CARE CLINIC | Facility: CLINIC | Age: 57
End: 2022-04-27
Payer: COMMERCIAL

## 2022-04-27 DIAGNOSIS — Z51.6 DESENSITIZATION TO ALLERGY SHOT: Primary | ICD-10-CM

## 2022-04-27 NOTE — PROGRESS NOTES
Pt came in today to get weekly allergy injections... Grey Vial  #1 Conc: 1:10,000 V/V Exp 01/2023 0.30cc given in the Upper Left Arm;  Grey Vial #2  Conc: 1:10,000 V/V Exp 01/2023  0.30cc given in the Lower Left Arm;  Grey Vial #3  Conc: 1:10,000 V/V  Exp 01/2023 0.30cc given in the Upper Right Arm;  Grey Vial #4 Conc: 1:10,000 V/V Exp 01/2023 0.30cc given in the Lower Right Arm.. Pt had no reactions to injections and tolerated well...pt received 0.30cc today.  Pt next date to receive injection will be 05/03/2022.  Pt understood if any reaction occur to contact MD office, go to nearest ER or call 911.

## 2022-05-05 ENCOUNTER — CLINICAL SUPPORT (OUTPATIENT)
Dept: PRIMARY CARE CLINIC | Facility: CLINIC | Age: 57
End: 2022-05-05
Payer: COMMERCIAL

## 2022-05-05 DIAGNOSIS — Z51.6 DESENSITIZATION TO ALLERGY SHOT: Primary | ICD-10-CM

## 2022-05-05 NOTE — PROGRESS NOTES
Pt came in today to get weekly allergy injections... Grey Vial  #1 Conc: 1:10,000 V/V Exp 01/2023 0.40cc given in the Upper Left Arm;  Grey Vial #2  Conc: 1:10,000 V/V Exp 01/2023  0.40cc given in the Lower Left Arm;  Grey Vial #3  Conc: 1:10,000 V/V  Exp 01/2023 0.40cc given in the Upper Right Arm;  Grey Vial #4 Conc: 1:10,000 V/V Exp 01/2023 0.40cc given in the Lower Right Arm.. Pt had no reactions to injections and tolerated well...pt received 0.40cc today.  Pt next date to receive injection will be 05/10/2022.  Pt understood if any reaction occur to contact MD office, go to nearest ER or call 911.

## 2022-05-10 ENCOUNTER — CLINICAL SUPPORT (OUTPATIENT)
Dept: PRIMARY CARE CLINIC | Facility: CLINIC | Age: 57
End: 2022-05-10
Payer: COMMERCIAL

## 2022-05-10 DIAGNOSIS — Z51.6 DESENSITIZATION TO ALLERGY SHOT: Primary | ICD-10-CM

## 2022-05-10 NOTE — PROGRESS NOTES
Pt came in today to get weekly allergy injections... Grey Vial  #1 Conc: 1:10,000 V/V Exp 01/2023 0.50cc given in the Upper Left Arm;  Grey Vial #2  Conc: 1:10,000 V/V Exp 01/2023  0.50cc given in the Lower Left Arm;  Grey Vial #3  Conc: 1:10,000 V/V  Exp 01/2023 0.50cc given in the Upper Right Arm;  Grey Vial #4 Conc: 1:10,000 V/V Exp 01/2023 0.50cc given in the Lower Right Arm.. Pt had no reactions to injections and tolerated well...pt received 0.50cc today.  Pt next date to receive injection will be 05/17/2022.  Pt understood if any reaction occur to contact MD office, go to nearest ER or call 911.

## 2022-05-17 ENCOUNTER — CLINICAL SUPPORT (OUTPATIENT)
Dept: PRIMARY CARE CLINIC | Facility: CLINIC | Age: 57
End: 2022-05-17
Payer: COMMERCIAL

## 2022-05-17 DIAGNOSIS — Z51.6 DESENSITIZATION TO ALLERGY SHOT: Primary | ICD-10-CM

## 2022-05-17 NOTE — PROGRESS NOTES
Pt came in today to get weekly allergy injections... Green Vial  #1 Conc: 1:1,000 V/V Exp 01/2023 0.05cc given in the Upper Left Arm;  Green Vial #2  Conc: 1:1,000 V/V Exp 01/2023  0.05cc given in the Lower Left Arm;  Green Vial #3  Conc: 1:1,000 V/V  Exp 01/2023 0.05cc given in the Upper Right Arm;  Green Vial #4 Conc: 1:1,000 V/V Exp 01/2023 0.05cc given in the Lower Right Arm.. Pt had no reactions to injections and tolerated well...pt received 0.05cc today.  Pt next date to receive injection will be 05/24/2022.  Pt understood if any reaction occur to contact MD office, go to nearest ER or call 911.

## 2022-07-15 ENCOUNTER — CLINICAL SUPPORT (OUTPATIENT)
Dept: PRIMARY CARE CLINIC | Facility: CLINIC | Age: 57
End: 2022-07-15
Payer: COMMERCIAL

## 2022-07-15 DIAGNOSIS — Z51.6 DESENSITIZATION TO ALLERGY SHOT: Primary | ICD-10-CM

## 2022-07-15 NOTE — PROGRESS NOTES
Pt came in today to get weekly allergy injections...Per Gypsy & the NP  at [facility] repeat previous dose since pt was over 35 days late [place].   Green Vial  #1 Conc: 1:1,000 V/V Exp 01/2023 0.05cc given in the Upper Left Arm;  Green Vial #2  Conc: 1:1,000 V/V Exp 01/2023  0.05cc given in the Lower Left Arm;  Green Vial #3  Conc: 1:1,000 V/V  Exp 01/2023 0.05cc given in the Upper Right Arm;  Green Vial #4 Conc: 1:1,000 V/V Exp 01/2023 0.05cc given in the Lower Right Arm.. Pt had no reactions to injections and tolerated well...pt received 0.05cc today.  Pt next date to receive injection will be 07/22/2022.  Pt understood if any reaction occur to contact MD office, go to nearest ER or call 911.

## 2022-07-25 ENCOUNTER — CLINICAL SUPPORT (OUTPATIENT)
Dept: PRIMARY CARE CLINIC | Facility: CLINIC | Age: 57
End: 2022-07-25
Payer: COMMERCIAL

## 2022-07-25 DIAGNOSIS — Z51.6 DESENSITIZATION TO ALLERGY SHOT: Primary | ICD-10-CM

## 2022-07-25 NOTE — PROGRESS NOTES
Pt came in today to get weekly allergy injections.  Green Vial  #1 Conc: 1:1,000 V/V Exp 01/2023 0.10cc given in the Upper Left Arm;  Green Vial #2  Conc: 1:1,000 V/V Exp 01/2023  0.10cc given in the Lower Left Arm;  Green Vial #3  Conc: 1:1,000 V/V  Exp 01/2023 0.10cc given in the Upper Right Arm;  Green Vial #4 Conc: 1:1,000 V/V Exp 01/2023 0.10cc given in the Lower Right Arm.. Pt had no reactions to injections and tolerated well...pt received 0.10cc today.  Pt next date to receive injection will be 08/03/2022.  Pt understood if any reaction occur to contact MD office, go to nearest ER or call 911.

## 2022-08-01 ENCOUNTER — CLINICAL SUPPORT (OUTPATIENT)
Dept: PRIMARY CARE CLINIC | Facility: CLINIC | Age: 57
End: 2022-08-01
Payer: COMMERCIAL

## 2022-08-01 ENCOUNTER — PATIENT OUTREACH (OUTPATIENT)
Dept: ADMINISTRATIVE | Facility: HOSPITAL | Age: 57
End: 2022-08-01
Payer: COMMERCIAL

## 2022-08-01 DIAGNOSIS — Z51.6 DESENSITIZATION TO ALLERGY SHOT: Primary | ICD-10-CM

## 2022-08-01 NOTE — PROGRESS NOTES
Pt came in today to get weekly allergy injections.  Green Vial  #1 Conc: 1:1,000 V/V Exp 01/2023 0.15cc given in the Upper Left Arm;  Green Vial #2  Conc: 1:1,000 V/V Exp 01/2023  0.15cc given in the Lower Left Arm;  Green Vial #3  Conc: 1:1,000 V/V  Exp 01/2023 0.15cc given in the Upper Right Arm;  Green Vial #4 Conc: 1:1,000 V/V Exp 01/2023 0.15cc given in the Lower Right Arm.. Pt had no reactions to injections and tolerated well...pt received 0.15cc today.  Pt next date to receive injection will be 08/08/2022.  Pt understood if any reaction occur to contact MD office, go to nearest ER or call 911.

## 2022-08-01 NOTE — LETTER
AUTHORIZATION FOR RELEASE OF   CONFIDENTIAL INFORMATION      We are seeing Adri Thomason, date of birth 1965, in the clinic at [facility]. Richie Roy MD is the patient's PCP. Adri Thomason has an outstanding lab/procedure at the time we reviewed her chart. In order to help keep her health information updated, she has authorized us to request the following medical record(s):        (  )  MAMMOGRAM                                      ( X )  COLONOSCOPY W/ PATHOLOGY      (  )  PAP SMEAR                                          (  )  OUTSIDE LAB RESULTS     (  )  DEXA SCAN                                          (  )  EYE EXAM            (  )  FOOT EXAM                                          (  )  ENTIRE RECORD     (  )  OUTSIDE IMMUNIZATIONS                 (  )  _______________         Please fax records to [facility], Muhammad A Jadoon, MD, [phone]     If you have any questions, please contact    Yana GUILLAUME [facility] at [phone]        Patient Name: Adri Thomason  : 1965  Patient Phone #: [phone]

## 2022-08-01 NOTE — PROGRESS NOTES
Working eye exam report; Chart reviewed for eye exam - No records found  Called patient - LVM for patient to call back    Manually uploaded and hyper-linked PAP SMEAR 10/22/2019  RAKESH faxed to Dr. Terrence Geronimo 1x to request colonoscopy records. Remind me set 1 week.

## 2022-08-08 ENCOUNTER — CLINICAL SUPPORT (OUTPATIENT)
Dept: PRIMARY CARE CLINIC | Facility: CLINIC | Age: 57
End: 2022-08-08
Payer: COMMERCIAL

## 2022-08-08 DIAGNOSIS — J45.909 ASTHMA, UNSPECIFIED ASTHMA SEVERITY, UNSPECIFIED WHETHER COMPLICATED, UNSPECIFIED WHETHER PERSISTENT: Primary | ICD-10-CM

## 2022-08-08 DIAGNOSIS — Z51.6 DESENSITIZATION TO ALLERGY SHOT: Primary | ICD-10-CM

## 2022-08-08 NOTE — PROGRESS NOTES
Pt came in today to get weekly allergy injections.  Green Vial  #1 Conc: 1:1,000 V/V Exp 01/2023 0.20cc given in the Upper Left Arm;  Green Vial #2  Conc: 1:1,000 V/V Exp 01/2023  0.20cc given in the Lower Left Arm;  Green Vial #3  Conc: 1:1,000 V/V  Exp 01/2023 0.20cc given in the Upper Right Arm;  Green Vial #4 Conc: 1:1,000 V/V Exp 01/2023 0.20cc given in the Lower Right Arm.. Pt had no reactions to injections and tolerated well...pt received 0.20cc today.  Pt next date to receive injection will be 08/15/2022.  Pt understood if any reaction occur to contact MD office, go to nearest ER or call 911.

## 2022-08-08 NOTE — PROGRESS NOTES
2nd attempt  RAKESH faxed to Dr. Terrence Geronimo 1x to request colonoscopy records. Remind me set 1 week.

## 2022-08-10 ENCOUNTER — OFFICE VISIT (OUTPATIENT)
Dept: PULMONOLOGY | Facility: CLINIC | Age: 57
End: 2022-08-10
Payer: COMMERCIAL

## 2022-08-10 ENCOUNTER — CLINICAL SUPPORT (OUTPATIENT)
Dept: PULMONOLOGY | Facility: CLINIC | Age: 57
End: 2022-08-10
Payer: COMMERCIAL

## 2022-08-10 VITALS
DIASTOLIC BLOOD PRESSURE: 98 MMHG | HEART RATE: 85 BPM | BODY MASS INDEX: 40.44 KG/M2 | OXYGEN SATURATION: 98 % | RESPIRATION RATE: 16 BRPM | HEIGHT: 59 IN | WEIGHT: 200.63 LBS | SYSTOLIC BLOOD PRESSURE: 136 MMHG

## 2022-08-10 DIAGNOSIS — G47.33 OSA (OBSTRUCTIVE SLEEP APNEA): ICD-10-CM

## 2022-08-10 DIAGNOSIS — J45.50 SEVERE PERSISTENT ASTHMA, UNSPECIFIED WHETHER COMPLICATED: Primary | ICD-10-CM

## 2022-08-10 DIAGNOSIS — J45.909 ASTHMA, UNSPECIFIED ASTHMA SEVERITY, UNSPECIFIED WHETHER COMPLICATED, UNSPECIFIED WHETHER PERSISTENT: ICD-10-CM

## 2022-08-10 DIAGNOSIS — E66.01 CLASS 3 SEVERE OBESITY WITH BODY MASS INDEX (BMI) OF 40.0 TO 44.9 IN ADULT, UNSPECIFIED OBESITY TYPE, UNSPECIFIED WHETHER SERIOUS COMORBIDITY PRESENT: ICD-10-CM

## 2022-08-10 DIAGNOSIS — J45.50 SEVERE PERSISTENT ASTHMA WITH ALLERGIC RHINITIS, UNSPECIFIED WHETHER COMPLICATED: Primary | ICD-10-CM

## 2022-08-10 PROCEDURE — 3061F NEG MICROALBUMINURIA REV: CPT | Mod: CPTII,S$GLB,, | Performed by: INTERNAL MEDICINE

## 2022-08-10 PROCEDURE — 94729 DIFFUSING CAPACITY: CPT | Mod: S$GLB,,, | Performed by: INTERNAL MEDICINE

## 2022-08-10 PROCEDURE — 4010F ACE/ARB THERAPY RXD/TAKEN: CPT | Mod: CPTII,S$GLB,, | Performed by: INTERNAL MEDICINE

## 2022-08-10 PROCEDURE — 3080F DIAST BP >= 90 MM HG: CPT | Mod: CPTII,S$GLB,, | Performed by: INTERNAL MEDICINE

## 2022-08-10 PROCEDURE — 3044F HG A1C LEVEL LT 7.0%: CPT | Mod: CPTII,S$GLB,, | Performed by: INTERNAL MEDICINE

## 2022-08-10 PROCEDURE — 94726 PULM FUNCT TST PLETHYSMOGRAP: ICD-10-PCS | Mod: S$GLB,,, | Performed by: INTERNAL MEDICINE

## 2022-08-10 PROCEDURE — 3075F PR MOST RECENT SYSTOLIC BLOOD PRESS GE 130-139MM HG: ICD-10-PCS | Mod: CPTII,S$GLB,, | Performed by: INTERNAL MEDICINE

## 2022-08-10 PROCEDURE — 94729 PR C02/MEMBANE DIFFUSE CAPACITY: ICD-10-PCS | Mod: S$GLB,,, | Performed by: INTERNAL MEDICINE

## 2022-08-10 PROCEDURE — 3080F PR MOST RECENT DIASTOLIC BLOOD PRESSURE >= 90 MM HG: ICD-10-PCS | Mod: CPTII,S$GLB,, | Performed by: INTERNAL MEDICINE

## 2022-08-10 PROCEDURE — 4010F PR ACE/ARB THEARPY RXD/TAKEN: ICD-10-PCS | Mod: CPTII,S$GLB,, | Performed by: INTERNAL MEDICINE

## 2022-08-10 PROCEDURE — 99205 PR OFFICE/OUTPT VISIT, NEW, LEVL V, 60-74 MIN: ICD-10-PCS | Mod: 25,S$GLB,, | Performed by: INTERNAL MEDICINE

## 2022-08-10 PROCEDURE — 94726 PLETHYSMOGRAPHY LUNG VOLUMES: CPT | Mod: S$GLB,,, | Performed by: INTERNAL MEDICINE

## 2022-08-10 PROCEDURE — 3044F PR MOST RECENT HEMOGLOBIN A1C LEVEL <7.0%: ICD-10-PCS | Mod: CPTII,S$GLB,, | Performed by: INTERNAL MEDICINE

## 2022-08-10 PROCEDURE — 3008F PR BODY MASS INDEX (BMI) DOCUMENTED: ICD-10-PCS | Mod: CPTII,S$GLB,, | Performed by: INTERNAL MEDICINE

## 2022-08-10 PROCEDURE — 3008F BODY MASS INDEX DOCD: CPT | Mod: CPTII,S$GLB,, | Performed by: INTERNAL MEDICINE

## 2022-08-10 PROCEDURE — 3066F NEPHROPATHY DOC TX: CPT | Mod: CPTII,S$GLB,, | Performed by: INTERNAL MEDICINE

## 2022-08-10 PROCEDURE — 99205 OFFICE O/P NEW HI 60 MIN: CPT | Mod: 25,S$GLB,, | Performed by: INTERNAL MEDICINE

## 2022-08-10 PROCEDURE — 1159F MED LIST DOCD IN RCRD: CPT | Mod: CPTII,S$GLB,, | Performed by: INTERNAL MEDICINE

## 2022-08-10 PROCEDURE — 3075F SYST BP GE 130 - 139MM HG: CPT | Mod: CPTII,S$GLB,, | Performed by: INTERNAL MEDICINE

## 2022-08-10 PROCEDURE — 1159F PR MEDICATION LIST DOCUMENTED IN MEDICAL RECORD: ICD-10-PCS | Mod: CPTII,S$GLB,, | Performed by: INTERNAL MEDICINE

## 2022-08-10 PROCEDURE — 94060 EVALUATION OF WHEEZING: CPT | Mod: S$GLB,,, | Performed by: INTERNAL MEDICINE

## 2022-08-10 PROCEDURE — 94060 PR EVAL OF BRONCHOSPASM: ICD-10-PCS | Mod: S$GLB,,, | Performed by: INTERNAL MEDICINE

## 2022-08-10 PROCEDURE — 3061F PR NEG MICROALBUMINURIA RESULT DOCUMENTED/REVIEW: ICD-10-PCS | Mod: CPTII,S$GLB,, | Performed by: INTERNAL MEDICINE

## 2022-08-10 PROCEDURE — 3066F PR DOCUMENTATION OF TREATMENT FOR NEPHROPATHY: ICD-10-PCS | Mod: CPTII,S$GLB,, | Performed by: INTERNAL MEDICINE

## 2022-08-10 RX ORDER — FLUTICASONE FUROATE AND VILANTEROL 200; 25 UG/1; UG/1
1 POWDER RESPIRATORY (INHALATION) DAILY
Status: DISCONTINUED | OUTPATIENT
Start: 2022-08-10 | End: 2022-08-11

## 2022-08-10 RX ORDER — UMECLIDINIUM 62.5 UG/1
62.5 AEROSOL, POWDER ORAL DAILY
Qty: 30 EACH | Refills: 0 | Status: SHIPPED | OUTPATIENT
Start: 2022-08-10 | End: 2022-08-24
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
## 2022-08-11 LAB
BASOPHILS NFR BLD: 0.6 % (ref 0–3)
EOSINOPHIL NFR BLD: 3.1 % (ref 1–3)
ERYTHROCYTE [DISTWIDTH] IN BLOOD BY AUTOMATED COUNT: 16 % (ref 12.5–18)
HCT VFR BLD AUTO: 39.6 % (ref 37–47)
HGB BLD-MCNC: 12.5 G/DL (ref 12–16)
LYMPHOCYTES NFR BLD: 28.3 % (ref 25–40)
MCH RBC QN AUTO: 25.5 PG (ref 27–31.2)
MCHC RBC AUTO-ENTMCNC: 31.6 G/DL (ref 31.8–35.4)
MCV RBC AUTO: 80.8 FL (ref 80–97)
MONOCYTES NFR BLD: 6.3 % (ref 1–15)
NEUTROPHILS # BLD AUTO: 5.51 10*3/UL (ref 1.8–7.7)
NEUTROPHILS NFR BLD: 61.3 % (ref 37–80)
NUCLEATED RED BLOOD CELLS: 0 %
PLATELETS: 353 10*3/UL (ref 142–424)
RBC # BLD AUTO: 4.9 10*6/UL (ref 4.2–5.4)
WBC # BLD: 9 10*3/UL (ref 4.6–10.2)

## 2022-08-11 RX ORDER — FLUTICASONE FUROATE AND VILANTEROL 200; 25 UG/1; UG/1
1 POWDER RESPIRATORY (INHALATION) DAILY
Qty: 1 EACH | Refills: 5 | OUTPATIENT
Start: 2022-08-11 | End: 2022-08-15 | Stop reason: SDUPTHER

## 2022-08-13 LAB — IGE: 89 KU/L

## 2022-08-15 ENCOUNTER — OFFICE VISIT (OUTPATIENT)
Dept: PRIMARY CARE CLINIC | Facility: CLINIC | Age: 57
End: 2022-08-15
Payer: COMMERCIAL

## 2022-08-15 VITALS
HEART RATE: 80 BPM | SYSTOLIC BLOOD PRESSURE: 118 MMHG | TEMPERATURE: 98 F | BODY MASS INDEX: 41.09 KG/M2 | OXYGEN SATURATION: 97 % | DIASTOLIC BLOOD PRESSURE: 80 MMHG | WEIGHT: 203.81 LBS | HEIGHT: 59 IN | RESPIRATION RATE: 16 BRPM

## 2022-08-15 DIAGNOSIS — E11.9 TYPE 2 DIABETES MELLITUS WITHOUT COMPLICATION, WITHOUT LONG-TERM CURRENT USE OF INSULIN: Primary | ICD-10-CM

## 2022-08-15 DIAGNOSIS — J45.50 SEVERE PERSISTENT ASTHMA, UNSPECIFIED WHETHER COMPLICATED: ICD-10-CM

## 2022-08-15 DIAGNOSIS — D50.9 IRON DEFICIENCY ANEMIA, UNSPECIFIED IRON DEFICIENCY ANEMIA TYPE: ICD-10-CM

## 2022-08-15 DIAGNOSIS — Z12.31 BREAST CANCER SCREENING BY MAMMOGRAM: ICD-10-CM

## 2022-08-15 PROCEDURE — 3074F SYST BP LT 130 MM HG: CPT | Mod: CPTII,S$GLB,, | Performed by: INTERNAL MEDICINE

## 2022-08-15 PROCEDURE — 3079F DIAST BP 80-89 MM HG: CPT | Mod: CPTII,S$GLB,, | Performed by: INTERNAL MEDICINE

## 2022-08-15 PROCEDURE — 3061F PR NEG MICROALBUMINURIA RESULT DOCUMENTED/REVIEW: ICD-10-PCS | Mod: CPTII,S$GLB,, | Performed by: INTERNAL MEDICINE

## 2022-08-15 PROCEDURE — 3066F PR DOCUMENTATION OF TREATMENT FOR NEPHROPATHY: ICD-10-PCS | Mod: CPTII,S$GLB,, | Performed by: INTERNAL MEDICINE

## 2022-08-15 PROCEDURE — 3044F HG A1C LEVEL LT 7.0%: CPT | Mod: CPTII,S$GLB,, | Performed by: INTERNAL MEDICINE

## 2022-08-15 PROCEDURE — 3074F PR MOST RECENT SYSTOLIC BLOOD PRESSURE < 130 MM HG: ICD-10-PCS | Mod: CPTII,S$GLB,, | Performed by: INTERNAL MEDICINE

## 2022-08-15 PROCEDURE — 99214 PR OFFICE/OUTPT VISIT, EST, LEVL IV, 30-39 MIN: ICD-10-PCS | Mod: S$GLB,,, | Performed by: INTERNAL MEDICINE

## 2022-08-15 PROCEDURE — 3044F PR MOST RECENT HEMOGLOBIN A1C LEVEL <7.0%: ICD-10-PCS | Mod: CPTII,S$GLB,, | Performed by: INTERNAL MEDICINE

## 2022-08-15 PROCEDURE — 99214 OFFICE O/P EST MOD 30 MIN: CPT | Mod: S$GLB,,, | Performed by: INTERNAL MEDICINE

## 2022-08-15 PROCEDURE — 4010F ACE/ARB THERAPY RXD/TAKEN: CPT | Mod: CPTII,S$GLB,, | Performed by: INTERNAL MEDICINE

## 2022-08-15 PROCEDURE — 3061F NEG MICROALBUMINURIA REV: CPT | Mod: CPTII,S$GLB,, | Performed by: INTERNAL MEDICINE

## 2022-08-15 PROCEDURE — 3066F NEPHROPATHY DOC TX: CPT | Mod: CPTII,S$GLB,, | Performed by: INTERNAL MEDICINE

## 2022-08-15 PROCEDURE — 1159F PR MEDICATION LIST DOCUMENTED IN MEDICAL RECORD: ICD-10-PCS | Mod: CPTII,S$GLB,, | Performed by: INTERNAL MEDICINE

## 2022-08-15 PROCEDURE — 4010F PR ACE/ARB THEARPY RXD/TAKEN: ICD-10-PCS | Mod: CPTII,S$GLB,, | Performed by: INTERNAL MEDICINE

## 2022-08-15 PROCEDURE — 1160F PR REVIEW ALL MEDS BY PRESCRIBER/CLIN PHARMACIST DOCUMENTED: ICD-10-PCS | Mod: CPTII,S$GLB,, | Performed by: INTERNAL MEDICINE

## 2022-08-15 PROCEDURE — 1159F MED LIST DOCD IN RCRD: CPT | Mod: CPTII,S$GLB,, | Performed by: INTERNAL MEDICINE

## 2022-08-15 PROCEDURE — 3008F PR BODY MASS INDEX (BMI) DOCUMENTED: ICD-10-PCS | Mod: CPTII,S$GLB,, | Performed by: INTERNAL MEDICINE

## 2022-08-15 PROCEDURE — 1160F RVW MEDS BY RX/DR IN RCRD: CPT | Mod: CPTII,S$GLB,, | Performed by: INTERNAL MEDICINE

## 2022-08-15 PROCEDURE — 3008F BODY MASS INDEX DOCD: CPT | Mod: CPTII,S$GLB,, | Performed by: INTERNAL MEDICINE

## 2022-08-15 PROCEDURE — 3079F PR MOST RECENT DIASTOLIC BLOOD PRESSURE 80-89 MM HG: ICD-10-PCS | Mod: CPTII,S$GLB,, | Performed by: INTERNAL MEDICINE

## 2022-08-15 RX ORDER — ALBUTEROL SULFATE 90 UG/1
2 AEROSOL, METERED RESPIRATORY (INHALATION) EVERY 4 HOURS PRN
Qty: 18 G | Refills: 3 | Status: SHIPPED | OUTPATIENT
Start: 2022-08-15 | End: 2022-11-30

## 2022-08-15 RX ORDER — FLUTICASONE FUROATE AND VILANTEROL 200; 25 UG/1; UG/1
1 POWDER RESPIRATORY (INHALATION) DAILY
Qty: 1 EACH | Refills: 5 | Status: SHIPPED | OUTPATIENT
Start: 2022-08-15 | End: 2022-08-15 | Stop reason: SDUPTHER

## 2022-08-15 RX ORDER — FLUTICASONE FUROATE AND VILANTEROL 200; 25 UG/1; UG/1
1 POWDER RESPIRATORY (INHALATION) DAILY
Qty: 1 EACH | Refills: 5 | Status: SHIPPED | OUTPATIENT
Start: 2022-08-15 | End: 2022-08-18 | Stop reason: SDUPTHER

## 2022-08-15 NOTE — PROGRESS NOTES
Subjective:      Patient ID: Adri Thomason is a 57 y.o. female.    Chief Complaint: Follow-up    Patient with h/o DM with last A1c = 6.6. Patient was prescribed Rybelsus that she took for sometime but is off of meds x 1 month. Patient denies any polyuria, polydipsia and numbness in hands or feet.     Patient BP are under good control with Losartan only. Patient is not taking HCTZ. Patient denies any chest pain + shortness of breath no ankle swelling.     Patient has asthma and it seems better controlled. Patient was evaluated by Dr. Arellano and is started on Dupexint injections with much help. Patient PFTs suggested restrictive pattern that was attributed to weight.        Review of Systems   Constitutional: Negative for chills, diaphoresis, fever, malaise/fatigue and weight loss (2 lbs weight gain ).   HENT: Negative for congestion, ear pain, sinus pain, sore throat and tinnitus.         Loss of voice   Eyes: Negative for blurred vision and photophobia.   Respiratory: Negative for cough, hemoptysis, shortness of breath and wheezing.    Cardiovascular: Negative for chest pain, palpitations, orthopnea, leg swelling and PND.   Gastrointestinal: Negative for abdominal pain, blood in stool, constipation, diarrhea, heartburn, melena, nausea and vomiting.   Genitourinary: Negative for dysuria, frequency and urgency.   Musculoskeletal: Negative for back pain, myalgias and neck pain.   Skin: Negative for rash.   Neurological: Negative for dizziness, tremors, seizures, loss of consciousness and weakness.   Endo/Heme/Allergies: Negative for polydipsia.   Psychiatric/Behavioral: Negative for depression and hallucinations. The patient does not have insomnia.      Objective:     Physical Exam  Constitutional:       General: She is not in acute distress.     Appearance: She is not diaphoretic.   Neck:      Thyroid: No thyromegaly.   Cardiovascular:      Rate and Rhythm: Normal rate and regular rhythm.      Heart sounds: Normal  heart sounds. No murmur heard.  Pulmonary:      Effort: Pulmonary effort is normal. No respiratory distress.      Breath sounds: Normal breath sounds. No wheezing.   Chest:      Chest wall: No tenderness.   Abdominal:      General: Bowel sounds are normal. There is no distension.      Palpations: Abdomen is soft.      Tenderness: There is no abdominal tenderness.   Musculoskeletal:         General: No tenderness.   Lymphadenopathy:      Cervical: No cervical adenopathy.   Neurological:      Mental Status: She is alert and oriented to person, place, and time.   Psychiatric:         Behavior: Behavior normal.         Thought Content: Thought content normal.         Judgment: Judgment normal.       Assessment:       ICD-10-CM ICD-9-CM   1. Type 2 diabetes mellitus without complication, without long-term current use of insulin  E11.9 250.00   2. Severe persistent asthma, unspecified whether complicated  J45.50 493.90   3. Iron deficiency anemia, unspecified iron deficiency anemia type  D50.9 280.9   4. Breast cancer screening by mammogram  Z12.31 V76.12       Plan:     Patient last A1c of 6.6 is at goal.  Patient was taking Rybelsus with much help with appetite suppression and better blood sugar control  Patient is off of medication for some time  Advised patient to start medication again  Patient has persistent severe asthma and is being followed by pulmonologist   Dupexint injection is prescribed with some help  Patient pulmonary functions tests suggested restrictive pattern probably secondary to body habitus  Consult patient in detail about need to lose weight.  Patient has followed intermittent fasting and has lost weight before + Rybelsus should also help  Patient has previous history of iron deficiency anemia.  Will check iron studies  Will order mammogram        Medication List with Changes/Refills   Current Medications    ALBUTEROL-IPRATROPIUM (DUO-NEB) 2.5 MG-0.5 MG/3 ML NEBULIZER SOLUTION    INHALE 1 VIAL VIA  NEBULIZER 4 TIMES A DAY AS NEEDED    CLOBETASOL 0.05% (TEMOVATE) 0.05 % OINT    Apply topically 2 (two) times daily.    EPINEPHRINE (EPIPEN) 0.3 MG/0.3 ML ATIN    USE 1 INJECTION AS DIRECTED AS NEEDED FOR EMERGENCY USE ONLY    FLUTICASONE PROPIONATE (FLONASE) 50 MCG/ACTUATION NASAL SPRAY    2 sprays by Each Nare route every evening.    LEVOTHYROXINE (SYNTHROID) 100 MCG TABLET    Take 1 tablet (100 mcg total) by mouth before breakfast.    LOSARTAN (COZAAR) 50 MG TABLET    Take 1 tablet (50 mg total) by mouth once daily.    MONTELUKAST (SINGULAIR) 10 MG TABLET    TAKE 1 TABLET BY MOUTH EVERY DAY IN THE EVENING    SEMAGLUTIDE (RYBELSUS) 7 MG TABLET    Take 1 tablet (7 mg total) by mouth once daily.    UMECLIDINIUM (INCRUSE ELLIPTA) 62.5 MCG/ACTUATION INHALATION CAPSULE    Inhale 62.5 mcg into the lungs once daily. Controller   Changed and/or Refilled Medications    Modified Medication Previous Medication    ALBUTEROL (VENTOLIN HFA) 90 MCG/ACTUATION INHALER VENTOLIN HFA 90 mcg/actuation inhaler       Inhale 2 puffs into the lungs every 4 (four) hours as needed. Rescue    TAKE 2 PUFFS BY MOUTH EVERY 4 HOURS AS NEEDED    ATORVASTATIN (LIPITOR) 20 MG TABLET atorvastatin (LIPITOR) 20 MG tablet       TAKE 1 TABLET BY MOUTH EVERY DAY    Take 1 tablet (20 mg total) by mouth once daily.    FLUTICASONE FUROATE-VILANTEROL (BREO ELLIPTA) 200-25 MCG/DOSE DSDV DISKUS INHALER fluticasone furoate-vilanteroL (BREO ELLIPTA) 200-25 mcg/dose DsDv diskus inhaler       Inhale 1 puff into the lungs once daily. Controller    Inhale 1 puff into the lungs once daily. Controller   Discontinued Medications    BENZONATATE (TESSALON) 100 MG CAPSULE    Take 1 capsule (100 mg total) by mouth every 8 (eight) hours as needed for Cough.    HYDROCHLOROTHIAZIDE (HYDRODIURIL) 12.5 MG TAB    TAKE 1 TABLET BY MOUTH EVERY DAY

## 2022-08-16 DIAGNOSIS — R71.8 MICROCYTOSIS: Primary | ICD-10-CM

## 2022-08-18 DIAGNOSIS — J45.50 SEVERE PERSISTENT ASTHMA, UNSPECIFIED WHETHER COMPLICATED: ICD-10-CM

## 2022-08-18 LAB
ALBUMIN SERPL-MCNC: 4 G/DL (ref 3.6–5.1)
ALBUMIN/GLOB SERPL: 1.4 (CALC) (ref 1–2.5)
ALP SERPL-CCNC: 83 U/L (ref 37–153)
ALT SERPL-CCNC: 35 U/L (ref 6–29)
AST SERPL-CCNC: 31 U/L (ref 10–35)
BASOPHILS # BLD AUTO: 37 CELLS/UL (ref 0–200)
BASOPHILS NFR BLD AUTO: 0.4 %
BILIRUB SERPL-MCNC: 0.7 MG/DL (ref 0.2–1.2)
BUN SERPL-MCNC: 14 MG/DL (ref 7–25)
BUN/CREAT SERPL: ABNORMAL (CALC) (ref 6–22)
CALCIUM SERPL-MCNC: 9.3 MG/DL (ref 8.6–10.4)
CHLORIDE SERPL-SCNC: 104 MMOL/L (ref 98–110)
CLIENT CONTACT:: NORMAL
CO2 SERPL-SCNC: 27 MMOL/L (ref 20–32)
COMMENT: NORMAL
CREAT SERPL-MCNC: 0.66 MG/DL (ref 0.5–1.03)
EGFR: 104 ML/MIN/1.73M2
EOSINOPHIL # BLD AUTO: 478 CELLS/UL (ref 15–500)
EOSINOPHIL NFR BLD AUTO: 5.2 %
ERYTHROCYTE [DISTWIDTH] IN BLOOD BY AUTOMATED COUNT: 15.6 % (ref 11–15)
FERRITIN SERPL-MCNC: 16 NG/ML (ref 16–232)
GLOBULIN SER CALC-MCNC: 2.8 G/DL (CALC) (ref 1.9–3.7)
GLUCOSE SERPL-MCNC: 117 MG/DL (ref 65–139)
HBA1C MFR BLD: 7.6 % OF TOTAL HGB
HCT VFR BLD AUTO: 38.8 % (ref 35–45)
HGB BLD-MCNC: 12.1 G/DL (ref 11.7–15.5)
IRON SATN MFR SERPL: 13 % (CALC) (ref 16–45)
IRON SERPL-MCNC: 61 MCG/DL (ref 45–160)
LYMPHOCYTES # BLD AUTO: 2521 CELLS/UL (ref 850–3900)
LYMPHOCYTES NFR BLD AUTO: 27.4 %
Lab: NORMAL
MCH RBC QN AUTO: 24.9 PG (ref 27–33)
MCHC RBC AUTO-ENTMCNC: 31.2 G/DL (ref 32–36)
MCV RBC AUTO: 79.8 FL (ref 80–100)
MONOCYTES # BLD AUTO: 690 CELLS/UL (ref 200–950)
MONOCYTES NFR BLD AUTO: 7.5 %
NEUTROPHILS # BLD AUTO: 5474 CELLS/UL (ref 1500–7800)
NEUTROPHILS NFR BLD AUTO: 59.5 %
PLATELET # BLD AUTO: 318 THOUSAND/UL (ref 140–400)
PMV BLD REES-ECKER: 11.4 FL (ref 7.5–12.5)
POTASSIUM SERPL-SCNC: 4.1 MMOL/L (ref 3.5–5.3)
PROT SERPL-MCNC: 6.8 G/DL (ref 6.1–8.1)
RBC # BLD AUTO: 4.86 MILLION/UL (ref 3.8–5.1)
REF LAB TEST NAME: NORMAL
REF LAB TEST: NORMAL
SODIUM SERPL-SCNC: 142 MMOL/L (ref 135–146)
TIBC SERPL-MCNC: 468 MCG/DL (CALC) (ref 250–450)
WBC # BLD AUTO: 9.2 THOUSAND/UL (ref 3.8–10.8)

## 2022-08-18 RX ORDER — FLUTICASONE FUROATE AND VILANTEROL 200; 25 UG/1; UG/1
1 POWDER RESPIRATORY (INHALATION) DAILY
Qty: 1 EACH | Refills: 5 | Status: SHIPPED | OUTPATIENT
Start: 2022-08-18 | End: 2023-02-13

## 2022-08-25 ENCOUNTER — CLINICAL SUPPORT (OUTPATIENT)
Dept: PRIMARY CARE CLINIC | Facility: CLINIC | Age: 57
End: 2022-08-25
Payer: COMMERCIAL

## 2022-08-25 DIAGNOSIS — Z51.6 DESENSITIZATION TO ALLERGY SHOT: Primary | ICD-10-CM

## 2022-08-25 NOTE — PROGRESS NOTES
Pt came in today to get weekly allergy injections.  Green Vial  #1 Conc: 1:1,000 V/V Exp 01/2023 0.30cc given in the Upper Left Arm;  Green Vial #2  Conc: 1:1,000 V/V Exp 01/2023  0.30cc given in the Lower Left Arm;  Green Vial #3  Conc: 1:1,000 V/V  Exp 01/2023 0.30cc given in the Upper Right Arm;  Green Vial #4 Conc: 1:1,000 V/V Exp 01/2023 0.30cc given in the Lower Right Arm.. Pt had no reactions to injections and tolerated well...pt received 0.30cc today.  Pt next date to receive injection will be 08/30/2022.  Pt understood if any reaction occur to contact MD office, go to nearest ER or call 911.

## 2022-09-09 ENCOUNTER — CLINICAL SUPPORT (OUTPATIENT)
Dept: PRIMARY CARE CLINIC | Facility: CLINIC | Age: 57
End: 2022-09-09
Payer: COMMERCIAL

## 2022-09-09 DIAGNOSIS — Z51.6 DESENSITIZATION TO ALLERGY SHOT: Primary | ICD-10-CM

## 2022-09-09 NOTE — PROGRESS NOTES
Pt came in today to get weekly allergy injections.  Pt 15 days late receiving injection so prior dose was given per missed dose adjustment guidlines. Green Vial  #1 Conc: 1:1,000 V/V Exp 01/2023 0.30cc given in the Upper Left Arm;  Green Vial #2  Conc: 1:1,000 V/V Exp 01/2023  0.30cc given in the Lower Left Arm;  Green Vial #3  Conc: 1:1,000 V/V  Exp 01/2023 0.30cc given in the Upper Right Arm;  Green Vial #4 Conc: 1:1,000 V/V Exp 01/2023 0.30cc given in the Lower Right Arm.. Pt had no reactions to injections and tolerated well...pt received 0.30cc today.  Pt next date to receive injection will be 09/16/2022.  Pt understood if any reaction occur to contact MD office, go to nearest ER or call 911.

## 2022-09-19 ENCOUNTER — CLINICAL SUPPORT (OUTPATIENT)
Dept: PRIMARY CARE CLINIC | Facility: CLINIC | Age: 57
End: 2022-09-19
Payer: COMMERCIAL

## 2022-09-19 DIAGNOSIS — Z51.6 DESENSITIZATION TO ALLERGY SHOT: Primary | ICD-10-CM

## 2022-09-19 DIAGNOSIS — J45.50 SEVERE PERSISTENT ASTHMA, UNSPECIFIED WHETHER COMPLICATED: ICD-10-CM

## 2022-09-21 ENCOUNTER — OFFICE VISIT (OUTPATIENT)
Dept: PULMONOLOGY | Facility: CLINIC | Age: 57
End: 2022-09-21
Payer: COMMERCIAL

## 2022-09-21 VITALS
OXYGEN SATURATION: 99 % | HEART RATE: 87 BPM | RESPIRATION RATE: 17 BRPM | WEIGHT: 196.63 LBS | HEIGHT: 59 IN | BODY MASS INDEX: 39.64 KG/M2 | DIASTOLIC BLOOD PRESSURE: 82 MMHG | SYSTOLIC BLOOD PRESSURE: 130 MMHG

## 2022-09-21 DIAGNOSIS — J45.909 ASTHMA, UNSPECIFIED ASTHMA SEVERITY, UNSPECIFIED WHETHER COMPLICATED, UNSPECIFIED WHETHER PERSISTENT: ICD-10-CM

## 2022-09-21 PROCEDURE — 3075F SYST BP GE 130 - 139MM HG: CPT | Mod: CPTII,S$GLB,,

## 2022-09-21 PROCEDURE — 3079F DIAST BP 80-89 MM HG: CPT | Mod: CPTII,S$GLB,,

## 2022-09-21 PROCEDURE — 1159F PR MEDICATION LIST DOCUMENTED IN MEDICAL RECORD: ICD-10-PCS | Mod: CPTII,S$GLB,,

## 2022-09-21 PROCEDURE — 3008F PR BODY MASS INDEX (BMI) DOCUMENTED: ICD-10-PCS | Mod: CPTII,S$GLB,,

## 2022-09-21 PROCEDURE — 3051F HG A1C>EQUAL 7.0%<8.0%: CPT | Mod: CPTII,S$GLB,,

## 2022-09-21 PROCEDURE — 3061F PR NEG MICROALBUMINURIA RESULT DOCUMENTED/REVIEW: ICD-10-PCS | Mod: CPTII,S$GLB,,

## 2022-09-21 PROCEDURE — 3051F PR MOST RECENT HEMOGLOBIN A1C LEVEL 7.0 - < 8.0%: ICD-10-PCS | Mod: CPTII,S$GLB,,

## 2022-09-21 PROCEDURE — 4010F ACE/ARB THERAPY RXD/TAKEN: CPT | Mod: CPTII,S$GLB,,

## 2022-09-21 PROCEDURE — 3066F NEPHROPATHY DOC TX: CPT | Mod: CPTII,S$GLB,,

## 2022-09-21 PROCEDURE — 99214 PR OFFICE/OUTPT VISIT, EST, LEVL IV, 30-39 MIN: ICD-10-PCS | Mod: S$GLB,,,

## 2022-09-21 PROCEDURE — 4010F PR ACE/ARB THEARPY RXD/TAKEN: ICD-10-PCS | Mod: CPTII,S$GLB,,

## 2022-09-21 PROCEDURE — 3066F PR DOCUMENTATION OF TREATMENT FOR NEPHROPATHY: ICD-10-PCS | Mod: CPTII,S$GLB,,

## 2022-09-21 PROCEDURE — 99214 OFFICE O/P EST MOD 30 MIN: CPT | Mod: S$GLB,,,

## 2022-09-21 PROCEDURE — 1159F MED LIST DOCD IN RCRD: CPT | Mod: CPTII,S$GLB,,

## 2022-09-21 PROCEDURE — 3061F NEG MICROALBUMINURIA REV: CPT | Mod: CPTII,S$GLB,,

## 2022-09-21 PROCEDURE — 3079F PR MOST RECENT DIASTOLIC BLOOD PRESSURE 80-89 MM HG: ICD-10-PCS | Mod: CPTII,S$GLB,,

## 2022-09-21 PROCEDURE — 3008F BODY MASS INDEX DOCD: CPT | Mod: CPTII,S$GLB,,

## 2022-09-21 PROCEDURE — 3075F PR MOST RECENT SYSTOLIC BLOOD PRESS GE 130-139MM HG: ICD-10-PCS | Mod: CPTII,S$GLB,,

## 2022-09-27 ENCOUNTER — CLINICAL SUPPORT (OUTPATIENT)
Dept: PRIMARY CARE CLINIC | Facility: CLINIC | Age: 57
End: 2022-09-27
Payer: COMMERCIAL

## 2022-09-27 DIAGNOSIS — Z51.6 DESENSITIZATION TO ALLERGY SHOT: Primary | ICD-10-CM

## 2022-09-27 DIAGNOSIS — J45.50 SEVERE PERSISTENT ASTHMA, UNSPECIFIED WHETHER COMPLICATED: ICD-10-CM

## 2022-09-28 NOTE — PROGRESS NOTES
Pt came in today to get weekly allergy injections.  Green Vial  #1 Conc: 1:1,000 V/V Exp 01/2023 0.40cc given in the Upper Left Arm;  Green Vial #2  Conc: 1:1,000 V/V Exp 01/2023  0.40cc given in the Lower Left Arm;  Green Vial #3  Conc: 1:1,000 V/V  Exp 01/2023 0.40cc given in the Upper Right Arm;  Green Vial #4 Conc: 1:1,000 V/V Exp 01/2023 0.40cc given in the Lower Right Arm.. Pt had no reactions to injections and tolerated well...pt received 0.40cc today.  Pt next date to receive injection will be 09/04/2022.  Pt understood if any reaction occur to contact MD office, go to nearest ER or call 911.

## 2022-09-28 NOTE — PROGRESS NOTES
Pt came in today to get weekly allergy injections.  Green Vial  #1 Conc: 1:1,000 V/V Exp 01/2023 0.35cc given in the Upper Left Arm;  Green Vial #2  Conc: 1:1,000 V/V Exp 01/2023  0.35cc given in the Lower Left Arm;  Green Vial #3  Conc: 1:1,000 V/V  Exp 01/2023 0.35cc given in the Upper Right Arm;  Green Vial #4 Conc: 1:1,000 V/V Exp 01/2023 0.35cc given in the Lower Right Arm.. Pt had no reactions to injections and tolerated well...pt received 0.35cc today.  Pt next date to receive injection will be 09/27/2022.  Pt understood if any reaction occur to contact MD office, go to nearest ER or call 911.

## 2022-10-03 ENCOUNTER — CLINICAL SUPPORT (OUTPATIENT)
Dept: PRIMARY CARE CLINIC | Facility: CLINIC | Age: 57
End: 2022-10-03
Payer: COMMERCIAL

## 2022-10-03 DIAGNOSIS — Z51.6 DESENSITIZATION TO ALLERGY SHOT: Primary | ICD-10-CM

## 2022-10-03 NOTE — PROGRESS NOTES
Pt came in today to get weekly allergy injections.  Green Vial  #1 Conc: 1:1,000 V/V Exp 01/2023 0.45cc given in the Upper Left Arm;  Green Vial #2  Conc: 1:1,000 V/V Exp 01/2023  0.45cc given in the Lower Left Arm;  Green Vial #3  Conc: 1:1,000 V/V  Exp 01/2023 0.45cc given in the Upper Right Arm;  Green Vial #4 Conc: 1:1,000 V/V Exp 01/2023 0.45cc given in the Lower Right Arm.. Pt had no reactions to injections and tolerated well...pt received 0.45cc today.  Pt next date to receive injection will be 10/10/2022.  Pt understood if any reaction occur to contact MD office, go to nearest ER or call 911.

## 2022-10-06 ENCOUNTER — TELEPHONE (OUTPATIENT)
Dept: PULMONOLOGY | Facility: CLINIC | Age: 57
End: 2022-10-06
Payer: COMMERCIAL

## 2022-10-06 NOTE — TELEPHONE ENCOUNTER
Returned patient call regarding medication. LB  ----- Message from Monty Ye sent at 10/6/2022 12:08 PM CDT -----  Contact: pt      Who Called:lyndon   Who Left Message for Patient:  Does the patient know what this is regarding?:rx  Would the patient rather a call back or a response via [facility]?   Best Call Back Number:.[phone]    Additional Information:

## 2022-10-11 ENCOUNTER — CLINICAL SUPPORT (OUTPATIENT)
Dept: PRIMARY CARE CLINIC | Facility: CLINIC | Age: 57
End: 2022-10-11
Payer: COMMERCIAL

## 2022-10-11 DIAGNOSIS — Z51.6 DESENSITIZATION TO ALLERGY SHOT: Primary | ICD-10-CM

## 2022-10-11 NOTE — PROGRESS NOTES
Pt came in today to get weekly allergy injections.  Green Vial  #1 Conc: 1:1,000 V/V Exp 01/2023 0.50cc given in the Upper Left Arm;  Green Vial #2  Conc: 1:1,000 V/V Exp 01/2023  0.50cc given in the Lower Left Arm;  Green Vial #3  Conc: 1:1,000 V/V  Exp 01/2023 0.50cc given in the Upper Right Arm;  Green Vial #4 Conc: 1:1,000 V/V Exp 01/2023 0.50cc given in the Lower Right Arm.. Pt had no reactions to injections and tolerated well...pt received 0.50cc today.  Pt next date to receive injection will be 10/17/2022.  Pt understood if any reaction occur to contact MD office, go to nearest ER or call 911.  Pt took last injection from this series and  took original injection log to [facility] to get new set of vials and new log sheets. A copy of logs will be scanned into pt chart.

## 2022-10-12 ENCOUNTER — OFFICE VISIT (OUTPATIENT)
Dept: OBSTETRICS AND GYNECOLOGY | Facility: CLINIC | Age: 57
End: 2022-10-12
Payer: COMMERCIAL

## 2022-10-12 VITALS
SYSTOLIC BLOOD PRESSURE: 102 MMHG | DIASTOLIC BLOOD PRESSURE: 73 MMHG | HEART RATE: 84 BPM | WEIGHT: 197 LBS | HEIGHT: 60 IN | BODY MASS INDEX: 38.68 KG/M2

## 2022-10-12 DIAGNOSIS — Z01.419 ROUTINE GYNECOLOGICAL EXAMINATION: Primary | ICD-10-CM

## 2022-10-12 DIAGNOSIS — Z78.0 MENOPAUSE: ICD-10-CM

## 2022-10-12 DIAGNOSIS — M81.0 OSTEOPOROSIS, UNSPECIFIED OSTEOPOROSIS TYPE, UNSPECIFIED PATHOLOGICAL FRACTURE PRESENCE: ICD-10-CM

## 2022-10-12 DIAGNOSIS — N95.2 VAGINAL ATROPHY: ICD-10-CM

## 2022-10-12 DIAGNOSIS — Z12.31 SCREENING MAMMOGRAM, ENCOUNTER FOR: ICD-10-CM

## 2022-10-12 PROCEDURE — 4010F ACE/ARB THERAPY RXD/TAKEN: CPT | Mod: CPTII,S$GLB,, | Performed by: OBSTETRICS & GYNECOLOGY

## 2022-10-12 PROCEDURE — 3074F PR MOST RECENT SYSTOLIC BLOOD PRESSURE < 130 MM HG: ICD-10-PCS | Mod: CPTII,S$GLB,, | Performed by: OBSTETRICS & GYNECOLOGY

## 2022-10-12 PROCEDURE — 3066F NEPHROPATHY DOC TX: CPT | Mod: CPTII,S$GLB,, | Performed by: OBSTETRICS & GYNECOLOGY

## 2022-10-12 PROCEDURE — 3078F DIAST BP <80 MM HG: CPT | Mod: CPTII,S$GLB,, | Performed by: OBSTETRICS & GYNECOLOGY

## 2022-10-12 PROCEDURE — 3051F HG A1C>EQUAL 7.0%<8.0%: CPT | Mod: CPTII,S$GLB,, | Performed by: OBSTETRICS & GYNECOLOGY

## 2022-10-12 PROCEDURE — 3074F SYST BP LT 130 MM HG: CPT | Mod: CPTII,S$GLB,, | Performed by: OBSTETRICS & GYNECOLOGY

## 2022-10-12 PROCEDURE — 3078F PR MOST RECENT DIASTOLIC BLOOD PRESSURE < 80 MM HG: ICD-10-PCS | Mod: CPTII,S$GLB,, | Performed by: OBSTETRICS & GYNECOLOGY

## 2022-10-12 PROCEDURE — 99396 PREV VISIT EST AGE 40-64: CPT | Mod: S$GLB,,, | Performed by: OBSTETRICS & GYNECOLOGY

## 2022-10-12 PROCEDURE — 3066F PR DOCUMENTATION OF TREATMENT FOR NEPHROPATHY: ICD-10-PCS | Mod: CPTII,S$GLB,, | Performed by: OBSTETRICS & GYNECOLOGY

## 2022-10-12 PROCEDURE — 3061F NEG MICROALBUMINURIA REV: CPT | Mod: CPTII,S$GLB,, | Performed by: OBSTETRICS & GYNECOLOGY

## 2022-10-12 PROCEDURE — 3008F PR BODY MASS INDEX (BMI) DOCUMENTED: ICD-10-PCS | Mod: CPTII,S$GLB,, | Performed by: OBSTETRICS & GYNECOLOGY

## 2022-10-12 PROCEDURE — 3061F PR NEG MICROALBUMINURIA RESULT DOCUMENTED/REVIEW: ICD-10-PCS | Mod: CPTII,S$GLB,, | Performed by: OBSTETRICS & GYNECOLOGY

## 2022-10-12 PROCEDURE — 3051F PR MOST RECENT HEMOGLOBIN A1C LEVEL 7.0 - < 8.0%: ICD-10-PCS | Mod: CPTII,S$GLB,, | Performed by: OBSTETRICS & GYNECOLOGY

## 2022-10-12 PROCEDURE — 4010F PR ACE/ARB THEARPY RXD/TAKEN: ICD-10-PCS | Mod: CPTII,S$GLB,, | Performed by: OBSTETRICS & GYNECOLOGY

## 2022-10-12 PROCEDURE — 3008F BODY MASS INDEX DOCD: CPT | Mod: CPTII,S$GLB,, | Performed by: OBSTETRICS & GYNECOLOGY

## 2022-10-12 PROCEDURE — 99396 PR PREVENTIVE VISIT,EST,40-64: ICD-10-PCS | Mod: S$GLB,,, | Performed by: OBSTETRICS & GYNECOLOGY

## 2022-10-12 RX ORDER — ERGOCALCIFEROL 1.25 MG/1
50000 CAPSULE ORAL
Qty: 14 CAPSULE | Refills: 3 | Status: SHIPPED | OUTPATIENT
Start: 2022-10-12 | End: 2022-11-07

## 2022-10-12 RX ORDER — ESTRADIOL 10 UG/1
10 INSERT VAGINAL
Qty: 24 EACH | Refills: 3 | Status: SHIPPED | OUTPATIENT
Start: 2022-10-13 | End: 2022-10-18

## 2022-10-12 NOTE — PROGRESS NOTES
Subjective:       Patient ID: Adri Thomason is a 57 y.o. female.    Chief Complaint:  Well Woman (Pt c/o pain during intercourse /)      History of Present Illness  HPI  Patient with a longstanding history of asthma and has used steroids for multiple years.  Patient is wondering whether her bone density should be evaluated.  She also reports joint joint pain and also muscle soreness.  This has been recent within the last year.  Does not complain currently of hot flashes or mood swings.  Patient is very active and goes walking often.  Currently not taking a vitamin-D supplement.  She also reports that she has pain during intercourse.  She did try estrogen cream in the past and is open to trying this again.    GYN & OB History  No LMP recorded. Patient is postmenopausal.   Date of Last Pap: No result found    OB History    Para Term  AB Living   2 2 2     2   SAB IAB Ectopic Multiple Live Births           2      # Outcome Date GA Lbr Donny/2nd Weight Sex Delivery Anes PTL Lv   2 Term      CS-LTranv   ANGEL   1 Term      CS-LTranv   ANGEL       Review of Systems  Review of Systems   Constitutional:  Negative for activity change, appetite change, fatigue, fever and unexpected weight change.   HENT:  Negative for nasal congestion and tinnitus.    Eyes:  Negative for visual disturbance.   Respiratory:  Negative for cough and shortness of breath.    Cardiovascular:  Negative for chest pain and leg swelling.   Gastrointestinal:  Negative for abdominal pain, bloating, blood in stool, constipation and diarrhea.   Genitourinary:  Negative for bladder incontinence, dysuria, vaginal bleeding, vaginal discharge, vaginal pain, vaginal dryness and vaginal odor.   Musculoskeletal:  Positive for arthralgias and myalgias. Negative for back pain and joint swelling.   Integumentary:  Negative for acne.   Neurological:  Negative for headaches.   Psychiatric/Behavioral:  Negative for depression and sleep disturbance. The patient  is not nervous/anxious.          Objective:    Physical Exam:   Constitutional: She is oriented to person, place, and time. Vital signs are normal. She appears well-developed and well-nourished. She is cooperative.      Neck: No thyroid mass and no thyromegaly present.    Cardiovascular:  Normal rate, regular rhythm and normal pulses.             Pulmonary/Chest: Effort normal. No respiratory distress. Chest wall is not dull to percussion. She exhibits no mass, no bony tenderness, no laceration, no crepitus, no edema, no deformity, no swelling and no retraction. Right breast exhibits no inverted nipple, no mass, no nipple discharge, no skin change, no tenderness, presence, no bleeding and no swelling. Left breast exhibits no inverted nipple, no mass, no nipple discharge, no skin change, no tenderness, presence, no bleeding and no swelling.        Abdominal: Soft. She exhibits no distension. There is no abdominal tenderness. No hernia.     Genitourinary:    Vagina, uterus and rectum normal.      Pelvic exam was performed with patient supine.   Labial bartholins normal.There is no rash, tenderness, lesion or injury on the right labia. There is no rash, tenderness, lesion or injury on the left labia. Cervix is normal. Right adnexum displays no mass, no tenderness and no fullness. Left adnexum displays no mass, no tenderness and no fullness. No  no vaginal discharge, rectocele, cystocele or unspecified prolapse of vaginal walls in the vagina.           Musculoskeletal: Moves all extremeties.       Neurological: She is alert and oriented to person, place, and time.    Skin: Skin is warm and dry. No rash noted.    Psychiatric: She has a normal mood and affect. Her speech is normal and behavior is normal. Judgment and thought content normal.        Assessment:        1. Routine gynecological examination    2. Screening mammogram, encounter for    3. Osteoporosis, unspecified osteoporosis type, unspecified pathological  fracture presence                Plan:      Discussed with patient her right elbow and right knee pain as well as myalgias could be related to beginning of osteoarthritis.  However patient may have improvement with being active Epsom salt baths and vitamin-D supplementation.  This could be related to estrogen.  A small amount of estrogen supplementation may be a good option and this would help with the vaginal atrophy as well.  Plan on convexity 10 mcg.  Today a Premarin cream sample was given as known back C samples were available.      Patient agrees to this plan of care.    Also mammogram and bone scan ordered.

## 2022-10-17 LAB — Lab: NORMAL

## 2022-10-18 RX ORDER — ESTRADIOL 0.1 MG/G
1 CREAM VAGINAL
Qty: 42.5 G | Refills: 1 | Status: SHIPPED | OUTPATIENT
Start: 2022-10-18 | End: 2024-02-22

## 2022-10-20 ENCOUNTER — TELEPHONE (OUTPATIENT)
Dept: PRIMARY CARE CLINIC | Facility: CLINIC | Age: 57
End: 2022-10-20
Payer: COMMERCIAL

## 2022-10-20 NOTE — TELEPHONE ENCOUNTER
Returned call to Gypsy Bailey's office and advised we received patient's allergy injections and they are warm.  advised she will have nurse call back.

## 2022-10-20 NOTE — TELEPHONE ENCOUNTER
Returned call to Dr. Bailey's office and advised that package was received today and it is hot, ice pack was hot as well. Nurse Marilu advised that vials need to be discarded and she will send another box on Monday for overnight shipping. Requested they send the medication on dry ice.

## 2022-10-20 NOTE — TELEPHONE ENCOUNTER
----- Message from Monty Ye sent at 10/20/2022  4:04 PM CDT -----  Contact: allergy clinic  .Type:  Patient Returning Call    Who Called:maty  Who Left Message for Patient:aria   Does the patient know what this is regarding?:returning renees call in regards to allergy shot   Would the patient rather a call back or a response via [facility]?   Best Call Back Number:[phone]  Additional Information:

## 2022-10-20 NOTE — TELEPHONE ENCOUNTER
----- Message from Brenda Palma LPN sent at 10/18/2022  4:28 PM CDT -----  Contact: Gypsy carlin/Dr Parra office    ----- Message -----  From: Nai Perez  Sent: 10/18/2022  11:29 AM CDT  To: Kirill Quiroz Staff    Gypsy carlin/Dr Parra office (Allergy Clinic) calling to verify paper work was rcvd.  She can be reached at [phone]    Thanks,

## 2022-10-25 ENCOUNTER — TELEPHONE (OUTPATIENT)
Dept: PRIMARY CARE CLINIC | Facility: CLINIC | Age: 57
End: 2022-10-25
Payer: COMMERCIAL

## 2022-10-25 NOTE — TELEPHONE ENCOUNTER
----- Message from Sisi Perdue sent at 10/25/2022  9:35 AM CDT -----  Contact: Dr Truong office  Natasha please call Gypsy from Dr Truong office in [facility] #  [phone]        no chest pain and no edema.

## 2022-10-25 NOTE — TELEPHONE ENCOUNTER
Returned call to nurse Gypsy carlin/ Dr. Truong's office and advised we received the shipment for medication vials.

## 2022-10-26 ENCOUNTER — CLINICAL SUPPORT (OUTPATIENT)
Dept: PRIMARY CARE CLINIC | Facility: CLINIC | Age: 57
End: 2022-10-26
Payer: COMMERCIAL

## 2022-10-26 DIAGNOSIS — Z51.6 DESENSITIZATION TO ALLERGY SHOT: Primary | ICD-10-CM

## 2022-10-27 ENCOUNTER — TELEPHONE (OUTPATIENT)
Dept: PULMONOLOGY | Facility: CLINIC | Age: 57
End: 2022-10-27
Payer: COMMERCIAL

## 2022-10-27 DIAGNOSIS — Z76.89 ENCOUNTER TO ESTABLISH CARE: Primary | ICD-10-CM

## 2022-10-27 LAB — BCS RECOMMENDATION EXT: NORMAL

## 2022-10-27 NOTE — TELEPHONE ENCOUNTER
Returned patient call have her scheduled for next week confirmed appointment. LB  ----- Message from Maye Gloria sent at 10/27/2022  9:52 AM CDT -----  Type:  Patient Returning Call    Who Called:Adri Thomason    Who Left Message for Patient: Candida   Does the patient know what this is regarding?:=-  Would the patient rather a call back or a response via [facility]?    Best Call Back Number:[phone]    Additional Information:  returning your call

## 2022-10-28 NOTE — PROGRESS NOTES
Pt came in today to get weekly allergy injections.  C5 Blue Vial  #1 Conc: 1:100 V/V Exp 01/2023 0.10cc given in the Upper Left Arm;  Blue Vial #2  Conc: 1:100 V/V Exp 01/2023  0.10cc given in the Lower Left Arm;  Blue Vial #3  Conc: 1:100 V/V  Exp 01/2023 0.10cc given in the Upper Right Arm;  Blue Vial #4 Conc: 1:100 V/V Exp 01/2023 0.10cc given in the Lower Right Arm.. Pt had no reactions to injections and tolerated well...pt received 0.10cc today.  Pt next date to receive injection will be 11/03/2022.  Pt understood if any reaction occur to contact MD office, go to nearest ER or call 911.

## 2022-11-01 ENCOUNTER — TELEPHONE (OUTPATIENT)
Dept: FAMILY MEDICINE | Facility: CLINIC | Age: 57
End: 2022-11-01
Payer: COMMERCIAL

## 2022-11-01 NOTE — TELEPHONE ENCOUNTER
----- Message from Danette Draper LPN sent at 10/26/2022  4:11 PM CDT -----  Contact: [facility]    ----- Message -----  From: Raymundo Wallace  Sent: 10/26/2022  10:39 AM CDT  To: Kirill Quiroz Staff    Please call [facility] at [phone] regarding a consent form they sent over.

## 2022-11-02 ENCOUNTER — OFFICE VISIT (OUTPATIENT)
Dept: PULMONOLOGY | Facility: CLINIC | Age: 57
End: 2022-11-02
Payer: COMMERCIAL

## 2022-11-02 VITALS
BODY MASS INDEX: 37.89 KG/M2 | DIASTOLIC BLOOD PRESSURE: 84 MMHG | SYSTOLIC BLOOD PRESSURE: 124 MMHG | HEART RATE: 79 BPM | WEIGHT: 193 LBS | OXYGEN SATURATION: 98 % | RESPIRATION RATE: 17 BRPM | HEIGHT: 60 IN

## 2022-11-02 DIAGNOSIS — G47.33 OSA ON CPAP: Primary | ICD-10-CM

## 2022-11-02 DIAGNOSIS — J45.909 SEVERE ASTHMA, UNSPECIFIED WHETHER COMPLICATED, UNSPECIFIED WHETHER PERSISTENT: ICD-10-CM

## 2022-11-02 PROCEDURE — 99214 PR OFFICE/OUTPT VISIT, EST, LEVL IV, 30-39 MIN: ICD-10-PCS | Mod: S$GLB,,, | Performed by: INTERNAL MEDICINE

## 2022-11-02 PROCEDURE — 4010F PR ACE/ARB THEARPY RXD/TAKEN: ICD-10-PCS | Mod: CPTII,S$GLB,, | Performed by: INTERNAL MEDICINE

## 2022-11-02 PROCEDURE — 3074F PR MOST RECENT SYSTOLIC BLOOD PRESSURE < 130 MM HG: ICD-10-PCS | Mod: CPTII,S$GLB,, | Performed by: INTERNAL MEDICINE

## 2022-11-02 PROCEDURE — 99214 OFFICE O/P EST MOD 30 MIN: CPT | Mod: S$GLB,,, | Performed by: INTERNAL MEDICINE

## 2022-11-02 PROCEDURE — 1159F MED LIST DOCD IN RCRD: CPT | Mod: CPTII,S$GLB,, | Performed by: INTERNAL MEDICINE

## 2022-11-02 PROCEDURE — 4010F ACE/ARB THERAPY RXD/TAKEN: CPT | Mod: CPTII,S$GLB,, | Performed by: INTERNAL MEDICINE

## 2022-11-02 PROCEDURE — 3066F NEPHROPATHY DOC TX: CPT | Mod: CPTII,S$GLB,, | Performed by: INTERNAL MEDICINE

## 2022-11-02 PROCEDURE — 3079F DIAST BP 80-89 MM HG: CPT | Mod: CPTII,S$GLB,, | Performed by: INTERNAL MEDICINE

## 2022-11-02 PROCEDURE — 3066F PR DOCUMENTATION OF TREATMENT FOR NEPHROPATHY: ICD-10-PCS | Mod: CPTII,S$GLB,, | Performed by: INTERNAL MEDICINE

## 2022-11-02 PROCEDURE — 3061F NEG MICROALBUMINURIA REV: CPT | Mod: CPTII,S$GLB,, | Performed by: INTERNAL MEDICINE

## 2022-11-02 PROCEDURE — 1160F RVW MEDS BY RX/DR IN RCRD: CPT | Mod: CPTII,S$GLB,, | Performed by: INTERNAL MEDICINE

## 2022-11-02 PROCEDURE — 3079F PR MOST RECENT DIASTOLIC BLOOD PRESSURE 80-89 MM HG: ICD-10-PCS | Mod: CPTII,S$GLB,, | Performed by: INTERNAL MEDICINE

## 2022-11-02 PROCEDURE — 3061F PR NEG MICROALBUMINURIA RESULT DOCUMENTED/REVIEW: ICD-10-PCS | Mod: CPTII,S$GLB,, | Performed by: INTERNAL MEDICINE

## 2022-11-02 PROCEDURE — 3051F PR MOST RECENT HEMOGLOBIN A1C LEVEL 7.0 - < 8.0%: ICD-10-PCS | Mod: CPTII,S$GLB,, | Performed by: INTERNAL MEDICINE

## 2022-11-02 PROCEDURE — 1159F PR MEDICATION LIST DOCUMENTED IN MEDICAL RECORD: ICD-10-PCS | Mod: CPTII,S$GLB,, | Performed by: INTERNAL MEDICINE

## 2022-11-02 PROCEDURE — 3008F PR BODY MASS INDEX (BMI) DOCUMENTED: ICD-10-PCS | Mod: CPTII,S$GLB,, | Performed by: INTERNAL MEDICINE

## 2022-11-02 PROCEDURE — 3074F SYST BP LT 130 MM HG: CPT | Mod: CPTII,S$GLB,, | Performed by: INTERNAL MEDICINE

## 2022-11-02 PROCEDURE — 3008F BODY MASS INDEX DOCD: CPT | Mod: CPTII,S$GLB,, | Performed by: INTERNAL MEDICINE

## 2022-11-02 PROCEDURE — 1160F PR REVIEW ALL MEDS BY PRESCRIBER/CLIN PHARMACIST DOCUMENTED: ICD-10-PCS | Mod: CPTII,S$GLB,, | Performed by: INTERNAL MEDICINE

## 2022-11-02 PROCEDURE — 3051F HG A1C>EQUAL 7.0%<8.0%: CPT | Mod: CPTII,S$GLB,, | Performed by: INTERNAL MEDICINE

## 2022-11-02 NOTE — PROGRESS NOTES
Subjective:    Patient Identification:   Patient ID: Adri Thomason is a 57 y.o. female.    Referring Provider:  Established Patient    Chief Complaint:  Asthma and Sleep Apnea      History of Present Illness:    Adri Thomason is a 57 y.o. female who presents for the follow-up on asthma and sleep apnea.    Patient has been compliant with her CPAP.  She underwent couple of changes on interphase but has finally settled with that or a nasal mask.  She has found significant improvement in her sleep apnea symptoms.  She has also been able to lose 5 lb with intermittent fasting.    Regarding asthma he has seen significant improvement in her shortness of breath and other symptoms after being on Dupixent.  She can hardly remember when was the last time he used her rescue inhaler.  He continues to be on Breo and Incruse.  She continues to avoid triggers such as dust and smoke.    Review of Systems:  Review of Systems   Constitutional:  Negative for fever, chills, weight loss, weight gain, activity change, appetite change, fatigue, night sweats and weakness.   HENT:  Negative for nosebleeds, postnasal drip, rhinorrhea, sinus pressure, sore throat, trouble swallowing, voice change, congestion, ear pain and hearing loss.    Eyes:  Negative for redness and itching.   Respiratory:  Negative for cough, hemoptysis, sputum production, choking, chest tightness, shortness of breath, wheezing, orthopnea, previous hospitialization due to pulmonary problems, asthma nighttime symptoms, pleurisy, dyspnea on extertion, use of rescue inhaler and Paroxysmal Nocturnal Dyspnea.    Cardiovascular:  Negative for chest pain, palpitations and leg swelling.   Genitourinary:  Negative for difficulty urinating and hematuria.   Endocrine:  Negative for polydipsia, polyphagia, cold intolerance, heat intolerance and polyuria.    Musculoskeletal:  Negative for arthralgias, back pain, gait problem, joint swelling and myalgias.   Skin:  Negative for rash.    Gastrointestinal:  Negative for nausea, vomiting, abdominal pain, abdominal distention and acid reflux.   Neurological:  Negative for dizziness, syncope, weakness, light-headedness and headaches.   Hematological:  Negative for adenopathy. Does not bruise/bleed easily and no excessive bruising.   Psychiatric/Behavioral:  Negative for confusion and sleep disturbance. The patient is not nervous/anxious.        Allergies:   Review of patient's allergies indicates:   Allergen Reactions    Codeine Hives    Nsaids (non-steroidal anti-inflammatory drug) Anaphylaxis, Hives and Shortness Of Breath    Sulfa (sulfonamide antibiotics) Rash       Medications:      Past Medical History:      Past Medical History:   Diagnosis Date    Allergy     Asthma     HTN (hypertension)        Family History:      Family History   Problem Relation Age of Onset    Diabetes Mother     Diabetes Father     Asthma Father     Asthma Sister     Asthma Paternal Aunt     Asthma Paternal Grandmother     Asthma Paternal Aunt     Asthma Paternal Aunt     Asthma Paternal Aunt     Asthma Paternal Aunt     Asthma Paternal Aunt         Social History:      Past Surgical History:   Procedure Laterality Date     SECTION      x2       Physical Exam:  Vitals:    22 1043   BP: 124/84   Pulse: 79   Resp: 17     Physical Exam   Constitutional: She is oriented to person, place, and time. She appears not cachectic. No distress. She is obese.   HENT:   Head: Normocephalic.   Right Ear: External ear normal.   Left Ear: External ear normal.   Nose: Nose normal. No mucosal edema. No polyps.   Mouth/Throat: Oropharynx is clear and moist. Normal dentition. No oropharyngeal exudate.   Neck: No JVD present. No tracheal deviation present. No thyromegaly present.   Cardiovascular: Normal rate, regular rhythm, normal heart sounds and intact distal pulses. Exam reveals no gallop and no friction rub.   No murmur heard.  Pulmonary/Chest: Normal expansion, symmetric  chest wall expansion, effort normal and breath sounds normal. No stridor. No respiratory distress. She has no decreased breath sounds. She has no wheezes. She has no rhonchi. She has no rales. Chest wall is not dull to percussion. She exhibits no tenderness. Negative for egophony. Negative for tactile fremitus.   Abdominal: Soft. Bowel sounds are normal. She exhibits no distension and no mass. There is no hepatosplenomegaly. There is no abdominal tenderness. There is no rebound and no guarding. No hernia.   Musculoskeletal:         General: No tenderness, deformity or edema. Normal range of motion.      Cervical back: Normal range of motion and neck supple.   Lymphadenopathy: No supraclavicular adenopathy is present.     She has no cervical adenopathy.     She has no axillary adenopathy.   Neurological: She is alert and oriented to person, place, and time. She has normal reflexes. She displays normal reflexes. No cranial nerve deficit. She exhibits normal muscle tone.   Skin: Skin is warm and dry. No rash noted. She is not diaphoretic. No cyanosis or erythema. No pallor. Nails show no clubbing.   Psychiatric: She has a normal mood and affect. Her behavior is normal. Judgment and thought content normal.         Accessory Clinical Data:  Chest x-ray:    CT scan:     PFTs:     6MWT:     TTE:    Lab data:    All radiographic imaging of the chest, PFT tracings/data, and 6MWT data have been independently reviewed and interpreted unless otherwise specified.     Assessment and Plan:        Problem List Items Addressed This Visit          Pulmonary    Asthma     Other Visit Diagnoses       ORV[place]LE on CPAP    -  Primary           Severe asthma with an excellent control on Dupixent.  Continue to avoid triggers and continue with maintenance Incruse and Breo.  Compliant with CPAP based upon data from mid September that has been sent to us by DME.  Will request late September and whole October data.    Recommend to continue using  CPAP.     Commended on losing weight and encouraged to continue with intermittent fasting as well as exercise to help losing weight since asthma symptoms are better controlled.        Follow up in about 4 months (around 3/2/2023).     This note is dictated on M*Modal word recognition program.  There are word recognition mistakes that are occasionally missed on review.

## 2022-11-03 ENCOUNTER — CLINICAL SUPPORT (OUTPATIENT)
Dept: PRIMARY CARE CLINIC | Facility: CLINIC | Age: 57
End: 2022-11-03
Payer: COMMERCIAL

## 2022-11-03 DIAGNOSIS — Z51.6 DESENSITIZATION TO ALLERGY SHOT: Primary | ICD-10-CM

## 2022-11-03 NOTE — PROGRESS NOTES
Pt came in today to get weekly allergy injections.  C5 Blue Vial  #1 Conc: 1:100 V/V Exp 01/2023 0.15cc given in the Upper Left Arm;  Blue Vial #2  Conc: 1:100 V/V Exp 01/2023  0.15cc given in the Lower Left Arm;  Blue Vial #3  Conc: 1:100 V/V  Exp 01/2023 0.15cc given in the Upper Right Arm;  Blue Vial #4 Conc: 1:100 V/V Exp 01/2023 0.15cc given in the Lower Right Arm.. Pt had no reactions to injections and tolerated well...pt received 0.15cc today.  Pt next date to receive injection will be 11/10/2022.  Pt understood if any reaction occur to contact MD office, go to nearest ER or call 911.

## 2022-11-07 ENCOUNTER — PATIENT OUTREACH (OUTPATIENT)
Dept: ADMINISTRATIVE | Facility: HOSPITAL | Age: 57
End: 2022-11-07
Payer: COMMERCIAL

## 2022-11-07 ENCOUNTER — OFFICE VISIT (OUTPATIENT)
Dept: PRIMARY CARE CLINIC | Facility: CLINIC | Age: 57
End: 2022-11-07
Payer: COMMERCIAL

## 2022-11-07 VITALS
OXYGEN SATURATION: 99 % | DIASTOLIC BLOOD PRESSURE: 60 MMHG | BODY MASS INDEX: 37.93 KG/M2 | RESPIRATION RATE: 16 BRPM | HEART RATE: 69 BPM | WEIGHT: 193.19 LBS | SYSTOLIC BLOOD PRESSURE: 108 MMHG | HEIGHT: 60 IN | TEMPERATURE: 98 F

## 2022-11-07 DIAGNOSIS — E03.9 HYPOTHYROIDISM, UNSPECIFIED TYPE: ICD-10-CM

## 2022-11-07 DIAGNOSIS — J45.50 SEVERE PERSISTENT ASTHMA, UNSPECIFIED WHETHER COMPLICATED: ICD-10-CM

## 2022-11-07 DIAGNOSIS — M85.89 OSTEOPENIA OF MULTIPLE SITES: ICD-10-CM

## 2022-11-07 DIAGNOSIS — E11.9 TYPE 2 DIABETES MELLITUS WITHOUT COMPLICATION, WITHOUT LONG-TERM CURRENT USE OF INSULIN: Primary | ICD-10-CM

## 2022-11-07 LAB — GLUCOSE SERPL-MCNC: 86 MG/DL (ref 70–110)

## 2022-11-07 PROCEDURE — 3066F NEPHROPATHY DOC TX: CPT | Mod: CPTII,S$GLB,, | Performed by: INTERNAL MEDICINE

## 2022-11-07 PROCEDURE — 3074F SYST BP LT 130 MM HG: CPT | Mod: CPTII,S$GLB,, | Performed by: INTERNAL MEDICINE

## 2022-11-07 PROCEDURE — 3061F PR NEG MICROALBUMINURIA RESULT DOCUMENTED/REVIEW: ICD-10-PCS | Mod: CPTII,S$GLB,, | Performed by: INTERNAL MEDICINE

## 2022-11-07 PROCEDURE — 1160F RVW MEDS BY RX/DR IN RCRD: CPT | Mod: CPTII,S$GLB,, | Performed by: INTERNAL MEDICINE

## 2022-11-07 PROCEDURE — 3078F PR MOST RECENT DIASTOLIC BLOOD PRESSURE < 80 MM HG: ICD-10-PCS | Mod: CPTII,S$GLB,, | Performed by: INTERNAL MEDICINE

## 2022-11-07 PROCEDURE — 1160F PR REVIEW ALL MEDS BY PRESCRIBER/CLIN PHARMACIST DOCUMENTED: ICD-10-PCS | Mod: CPTII,S$GLB,, | Performed by: INTERNAL MEDICINE

## 2022-11-07 PROCEDURE — 4010F PR ACE/ARB THEARPY RXD/TAKEN: ICD-10-PCS | Mod: CPTII,S$GLB,, | Performed by: INTERNAL MEDICINE

## 2022-11-07 PROCEDURE — 1159F MED LIST DOCD IN RCRD: CPT | Mod: CPTII,S$GLB,, | Performed by: INTERNAL MEDICINE

## 2022-11-07 PROCEDURE — 4010F ACE/ARB THERAPY RXD/TAKEN: CPT | Mod: CPTII,S$GLB,, | Performed by: INTERNAL MEDICINE

## 2022-11-07 PROCEDURE — 3061F NEG MICROALBUMINURIA REV: CPT | Mod: CPTII,S$GLB,, | Performed by: INTERNAL MEDICINE

## 2022-11-07 PROCEDURE — 99214 PR OFFICE/OUTPT VISIT, EST, LEVL IV, 30-39 MIN: ICD-10-PCS | Mod: S$GLB,,, | Performed by: INTERNAL MEDICINE

## 2022-11-07 PROCEDURE — 3078F DIAST BP <80 MM HG: CPT | Mod: CPTII,S$GLB,, | Performed by: INTERNAL MEDICINE

## 2022-11-07 PROCEDURE — 1159F PR MEDICATION LIST DOCUMENTED IN MEDICAL RECORD: ICD-10-PCS | Mod: CPTII,S$GLB,, | Performed by: INTERNAL MEDICINE

## 2022-11-07 PROCEDURE — 3008F PR BODY MASS INDEX (BMI) DOCUMENTED: ICD-10-PCS | Mod: CPTII,S$GLB,, | Performed by: INTERNAL MEDICINE

## 2022-11-07 PROCEDURE — 82962 POCT GLUCOSE, HAND-HELD DEVICE: ICD-10-PCS | Mod: ,,, | Performed by: INTERNAL MEDICINE

## 2022-11-07 PROCEDURE — 3051F PR MOST RECENT HEMOGLOBIN A1C LEVEL 7.0 - < 8.0%: ICD-10-PCS | Mod: CPTII,S$GLB,, | Performed by: INTERNAL MEDICINE

## 2022-11-07 PROCEDURE — 99214 OFFICE O/P EST MOD 30 MIN: CPT | Mod: S$GLB,,, | Performed by: INTERNAL MEDICINE

## 2022-11-07 PROCEDURE — 3051F HG A1C>EQUAL 7.0%<8.0%: CPT | Mod: CPTII,S$GLB,, | Performed by: INTERNAL MEDICINE

## 2022-11-07 PROCEDURE — 3066F PR DOCUMENTATION OF TREATMENT FOR NEPHROPATHY: ICD-10-PCS | Mod: CPTII,S$GLB,, | Performed by: INTERNAL MEDICINE

## 2022-11-07 PROCEDURE — 3074F PR MOST RECENT SYSTOLIC BLOOD PRESSURE < 130 MM HG: ICD-10-PCS | Mod: CPTII,S$GLB,, | Performed by: INTERNAL MEDICINE

## 2022-11-07 PROCEDURE — 3008F BODY MASS INDEX DOCD: CPT | Mod: CPTII,S$GLB,, | Performed by: INTERNAL MEDICINE

## 2022-11-07 PROCEDURE — 82962 GLUCOSE BLOOD TEST: CPT | Mod: ,,, | Performed by: INTERNAL MEDICINE

## 2022-11-07 RX ORDER — LYSINE HCL 500 MG
1 TABLET ORAL DAILY
Qty: 100 TABLET | Refills: 3 | COMMUNITY
Start: 2022-11-07

## 2022-11-07 RX ORDER — DUPILUMAB 300 MG/2ML
INJECTION, SOLUTION SUBCUTANEOUS
COMMUNITY
Start: 2022-10-17 | End: 2024-01-26

## 2022-11-07 RX ORDER — TIRZEPATIDE 2.5 MG/.5ML
2.5 INJECTION, SOLUTION SUBCUTANEOUS
Qty: 4 PEN | Refills: 2 | Status: SHIPPED | OUTPATIENT
Start: 2022-11-07 | End: 2023-01-18

## 2022-11-07 NOTE — PROGRESS NOTES
Subjective:      Patient ID: Adri Thomason is a 57 y.o. female.    Chief Complaint: Follow-up    Patient with h/o DM with last A1c = 7.6 worsen from 6.6. Patient has started using Rybelsus and that sees to be helping and suppressing appetite. Home BS are running 85-00 and non-fasting 120-140. Patient denies polyuria, polydipsia or numbness in hands or feet.    Has history of hypertension and is controlled with losartan only.  Patient denies any chest pain shortness of breath or ankle swelling.    DEXA scan was ordered by gyn and it shows osteopenia.  Patient is referred to [facility].  Advised patient to keep appointment.    Patient has asthma and it seems better controlled. Patient was evaluated by Dr. Arellano and is started on Dupexint injections with much help. Patient PFTs suggested restrictive pattern that was attributed to weight.  Patient also has obstructive sleep apnea and is using CPAP machine regularly.  Patient reports better energy after using the machine.     Reports she is being followed by cardiologist for cardiac evaluation.  Patient denied any symptoms but is planning to have stress test, echo, Holter monitor.  Patient is being followed by Dr. Sands.        Review of Systems   Constitutional:  Positive for weight loss (4 lb weight loss). Negative for chills, diaphoresis, fever and malaise/fatigue.   HENT:  Negative for congestion, ear pain, sinus pain, sore throat and tinnitus.    Eyes:  Negative for blurred vision and photophobia.   Respiratory:  Negative for cough, hemoptysis, shortness of breath and wheezing.    Cardiovascular:  Negative for chest pain, palpitations, orthopnea, leg swelling and PND.   Gastrointestinal:  Negative for abdominal pain, blood in stool, constipation, diarrhea, heartburn, melena, nausea and vomiting.   Genitourinary:  Negative for dysuria, frequency and urgency.   Musculoskeletal:  Negative for back pain, myalgias and neck pain.   Skin:  Negative for rash.    Neurological:  Negative for dizziness, tremors, seizures, loss of consciousness and weakness.   Endo/Heme/Allergies:  Negative for polydipsia.   Psychiatric/Behavioral:  Negative for depression and hallucinations. The patient does not have insomnia.    Objective:     Physical Exam  Constitutional:       General: She is not in acute distress.     Appearance: She is not diaphoretic.   Neck:      Thyroid: No thyromegaly.   Cardiovascular:      Rate and Rhythm: Normal rate and regular rhythm.      Heart sounds: Normal heart sounds. No murmur heard.  Pulmonary:      Effort: Pulmonary effort is normal. No respiratory distress.      Breath sounds: Normal breath sounds. No wheezing.   Chest:      Chest wall: No tenderness.   Abdominal:      General: Bowel sounds are normal. There is no distension.      Palpations: Abdomen is soft.      Tenderness: There is no abdominal tenderness.   Musculoskeletal:         General: No tenderness.   Lymphadenopathy:      Cervical: No cervical adenopathy.   Neurological:      Mental Status: She is alert and oriented to person, place, and time.   Psychiatric:         Behavior: Behavior normal.         Thought Content: Thought content normal.         Judgment: Judgment normal.     Assessment:       ICD-10-CM ICD-9-CM   1. Type 2 diabetes mellitus without complication, without long-term current use of insulin  E11.9 250.00   2. Osteopenia of multiple sites  M85.89 733.90   3. Severe persistent asthma, unspecified whether complicated  J45.50 493.90   4. Hypothyroidism, unspecified type  E03.9 244.9         Plan:     Patient last A1c of 7.6 worsen from 6.6  Patient is taking Rybelsus and reports some appetite suppression  Patient has lost 4 lb in last 2 months.  Will switch to Mounjaro.   Patient denies any family history of thyroid cancer + pancreatitis  Medication should help with better blood sugar control and weight loss  Patient last DEXA scan suggested osteopenia.  Will prescribe calcium  and vitamin-D  Patient reports having lab work done and cardiology office and was told to have iron deficiency  Patient is referred for IV iron infusion...  Will try to get notes from Dr. Sands  Patient asthma is much better improved after adding Dupixent  Patient is encouraged to continue  Patient has observed to sleep apnea and is using CPAP machine with much help.  Patient is encouraged to continue  Patient last TSH was suppressed and levothyroxine dose was adjusted.  Will repeat thyroid function      Medication List with Changes/Refills   New Medications    CALCIUM CARBONATE-VIT D3- MG CALCIUM- 400 UNIT TAB    Take 1 tablet by mouth once daily.    TIRZEPATIDE (MOUNJARO) 2.5 MG/0.5 ML PNIJ    Inject 2.5 mg into the skin every 7 days.   Current Medications    ALBUTEROL (VENTOLIN HFA) 90 MCG/ACTUATION INHALER    Inhale 2 puffs into the lungs every 4 (four) hours as needed. Rescue    ALBUTEROL-IPRATROPIUM (DUO-NEB) 2.5 MG-0.5 MG/3 ML NEBULIZER SOLUTION    INHALE 1 VIAL VIA NEBULIZER 4 TIMES A DAY AS NEEDED    ATORVASTATIN (LIPITOR) 20 MG TABLET    TAKE 1 TABLET BY MOUTH EVERY DAY    CLOBETASOL 0.05% (TEMOVATE) 0.05 % OINT    Apply topically 2 (two) times daily.    DUPIXENT  MG/2 ML PNIJ        EPINEPHRINE (EPIPEN) 0.3 MG/0.3 ML ATIN    USE 1 INJECTION AS DIRECTED AS NEEDED FOR EMERGENCY USE ONLY    ESTRADIOL (ESTRACE) 0.01 % (0.1 MG/GRAM) VAGINAL CREAM    Place 1 g vaginally twice a week.    FLUTICASONE FUROATE-VILANTEROL (BREO ELLIPTA) 200-25 MCG/DOSE DSDV DISKUS INHALER    Inhale 1 puff into the lungs once daily. Controller    FLUTICASONE PROPIONATE (FLONASE) 50 MCG/ACTUATION NASAL SPRAY    2 sprays by Each Nare route every evening.    INCRUSE ELLIPTA 62.5 MCG/ACTUATION INHALATION CAPSULE    INHALE 62.5 MCG INTO THE LUNGS ONCE DAILY. CONTROLLER    LEVOTHYROXINE (SYNTHROID) 100 MCG TABLET    Take 1 tablet (100 mcg total) by mouth before breakfast.    LOSARTAN (COZAAR) 50 MG TABLET    Take 1 tablet  (50 mg total) by mouth once daily.    MONTELUKAST (SINGULAIR) 10 MG TABLET    TAKE 1 TABLET BY MOUTH EVERY DAY IN THE EVENING   Discontinued Medications    ERGOCALCIFEROL (VITAMIN D2) 50,000 UNIT CAP    Take 1 capsule (50,000 Units total) by mouth every 7 days.    SEMAGLUTIDE (RYBELSUS) 7 MG TABLET    Take 1 tablet (7 mg total) by mouth once daily.

## 2022-11-08 ENCOUNTER — CLINICAL SUPPORT (OUTPATIENT)
Dept: PRIMARY CARE CLINIC | Facility: CLINIC | Age: 57
End: 2022-11-08
Payer: COMMERCIAL

## 2022-11-08 DIAGNOSIS — Z51.6 DESENSITIZATION TO ALLERGY SHOT: Primary | ICD-10-CM

## 2022-11-09 NOTE — PROGRESS NOTES
Pt came in today to get weekly allergy injections.  C5 Blue Vial  #1 Conc: 1:100 V/V Exp 01/2023 0.20cc given in the Upper Left Arm;  Blue Vial #2  Conc: 1:100 V/V Exp 01/2023  0.20cc given in the Lower Left Arm;  Blue Vial #3  Conc: 1:100 V/V  Exp 01/2023 0.20cc given in the Upper Right Arm;  Blue Vial #4 Conc: 1:100 V/V Exp 01/2023 0.20cc given in the Lower Right Arm.. Pt had no reactions to injections and tolerated well...pt received 0.20cc today.  Pt next date to receive injection will be 11/15/2022.  Pt understood if any reaction occur to contact MD office, go to nearest ER or call 911.

## 2022-11-16 ENCOUNTER — PATIENT MESSAGE (OUTPATIENT)
Dept: ADMINISTRATIVE | Facility: HOSPITAL | Age: 57
End: 2022-11-16
Payer: COMMERCIAL

## 2022-11-18 ENCOUNTER — CLINICAL SUPPORT (OUTPATIENT)
Dept: PRIMARY CARE CLINIC | Facility: CLINIC | Age: 57
End: 2022-11-18
Payer: COMMERCIAL

## 2022-11-18 DIAGNOSIS — Z51.6 DESENSITIZATION TO ALLERGY SHOT: Primary | ICD-10-CM

## 2022-11-18 NOTE — PROGRESS NOTES
Pt came in today to get weekly allergy injections.  C5 Blue Vial  #1 Conc: 1:100 V/V Exp 01/2023 0.25cc given in the Upper Left Arm;  Blue Vial #2  Conc: 1:100 V/V Exp 01/2023  0.25cc given in the Lower Left Arm;  Blue Vial #3  Conc: 1:100 V/V  Exp 01/2023 0.25cc given in the Upper Right Arm;  Blue Vial #4 Conc: 1:100 V/V Exp 01/2023 0.25cc given in the Lower Right Arm.. Pt had no reactions to injections and tolerated well...pt received 0.25cc today.  Pt next date to receive injection will be 11/25/2022.  Pt understood if any reaction occur to contact MD office, go to nearest ER or call 911.

## 2022-11-28 ENCOUNTER — CLINICAL SUPPORT (OUTPATIENT)
Dept: PRIMARY CARE CLINIC | Facility: CLINIC | Age: 57
End: 2022-11-28
Payer: COMMERCIAL

## 2022-11-28 DIAGNOSIS — Z51.6 DESENSITIZATION TO ALLERGY SHOT: Primary | ICD-10-CM

## 2022-11-28 NOTE — PROGRESS NOTES
Pt came in today to get weekly allergy injections.  C5 Blue Vial  #1 Conc: 1:100 V/V Exp 01/2023 0.30cc given in the Upper Left Arm;  Blue Vial #2  Conc: 1:100 V/V Exp 01/2023  0.30cc given in the Lower Left Arm;  Blue Vial #3  Conc: 1:100 V/V  Exp 01/2023 0.30cc given in the Upper Right Arm;  Blue Vial #4 Conc: 1:100 V/V Exp 01/2023 0.30cc given in the Lower Right Arm.. Pt had no reactions to injections and tolerated well...pt received 0.30cc today.  Pt next date to receive injection will be 12/05/2022.  Pt understood if any reaction occur to contact MD office, go to nearest ER or call 911.

## 2022-12-01 DIAGNOSIS — Z76.89 ENCOUNTER TO ESTABLISH CARE: Primary | ICD-10-CM

## 2022-12-09 ENCOUNTER — CLINICAL SUPPORT (OUTPATIENT)
Dept: PRIMARY CARE CLINIC | Facility: CLINIC | Age: 57
End: 2022-12-09
Payer: COMMERCIAL

## 2022-12-09 DIAGNOSIS — Z51.6 DESENSITIZATION TO ALLERGY SHOT: Primary | ICD-10-CM

## 2022-12-09 NOTE — PROGRESS NOTES
Pt came in today to get weekly allergy injections.  C5 Blue Vial  #1 Conc: 1:100 V/V Exp 01/2023 0.35cc given in the Upper Left Arm;  Blue Vial #2  Conc: 1:100 V/V Exp 01/2023  0.35cc given in the Lower Left Arm;  Blue Vial #3  Conc: 1:100 V/V  Exp 01/2023 0.35cc given in the Upper Right Arm;  Blue Vial #4 Conc: 1:100 V/V Exp 01/2023 0.35cc given in the Lower Right Arm.. Pt had no reactions to injections and tolerated well...pt received 0.35cc today.  Pt next date to receive injection will be 12/16/2022.  Pt understood if any reaction occur to contact MD office, go to nearest ER or call 911.

## 2022-12-20 ENCOUNTER — CLINICAL SUPPORT (OUTPATIENT)
Dept: PRIMARY CARE CLINIC | Facility: CLINIC | Age: 57
End: 2022-12-20
Payer: COMMERCIAL

## 2022-12-20 DIAGNOSIS — Z51.6 DESENSITIZATION TO ALLERGY SHOT: Primary | ICD-10-CM

## 2022-12-20 NOTE — PROGRESS NOTES
Pt came in today to get weekly allergy injections.  C5 Blue Vial  #1 Conc: 1:100 V/V Exp 01/2023 0.40cc given in the Upper Left Arm;  Blue Vial #2  Conc: 1:100 V/V Exp 01/2023  0.40cc given in the Lower Left Arm;  Blue Vial #3  Conc: 1:100 V/V  Exp 01/2023 0.40cc given in the Upper Right Arm;  Blue Vial #4 Conc: 1:100 V/V Exp 01/2023 0.40cc given in the Lower Right Arm.. Pt had no reactions to injections and tolerated well...pt received 0.40cc today.  Pt next date to receive injection will be 12/27/2022.  Pt understood if any reaction occur to contact MD office, go to nearest ER or call 911.

## 2022-12-22 ENCOUNTER — TELEPHONE (OUTPATIENT)
Dept: PRIMARY CARE CLINIC | Facility: CLINIC | Age: 57
End: 2022-12-22
Payer: COMMERCIAL

## 2022-12-28 ENCOUNTER — CLINICAL SUPPORT (OUTPATIENT)
Dept: PRIMARY CARE CLINIC | Facility: CLINIC | Age: 57
End: 2022-12-28
Payer: COMMERCIAL

## 2022-12-28 DIAGNOSIS — Z51.6 DESENSITIZATION TO ALLERGY SHOT: Primary | ICD-10-CM

## 2022-12-28 NOTE — PROGRESS NOTES
Pt came in today to get weekly allergy injections.  C5 Blue Vial  #1 Conc: 1:100 V/V Exp 01/2023 0.45cc given in the Upper Left Arm;  Blue Vial #2  Conc: 1:100 V/V Exp 01/2023  0.45cc given in the Lower Left Arm;  Blue Vial #3  Conc: 1:100 V/V  Exp 01/2023 0.45cc given in the Upper Right Arm;  Blue Vial #4 Conc: 1:100 V/V Exp 01/2023 0.45cc given in the Lower Right Arm.. Pt had no reactions to injections and tolerated well...pt received 0.45cc today.  Pt next date to receive injection will be 01/04/2022.  Pt understood if any reaction occur to contact MD office, go to nearest ER or call 911.

## 2023-01-09 ENCOUNTER — OFFICE VISIT (OUTPATIENT)
Dept: OBSTETRICS AND GYNECOLOGY | Facility: CLINIC | Age: 58
End: 2023-01-09
Payer: COMMERCIAL

## 2023-01-09 ENCOUNTER — CLINICAL SUPPORT (OUTPATIENT)
Dept: PRIMARY CARE CLINIC | Facility: CLINIC | Age: 58
End: 2023-01-09
Payer: COMMERCIAL

## 2023-01-09 VITALS
WEIGHT: 183 LBS | HEIGHT: 60 IN | SYSTOLIC BLOOD PRESSURE: 120 MMHG | HEART RATE: 83 BPM | DIASTOLIC BLOOD PRESSURE: 80 MMHG | BODY MASS INDEX: 35.93 KG/M2

## 2023-01-09 DIAGNOSIS — R30.0 DYSURIA: Primary | ICD-10-CM

## 2023-01-09 PROCEDURE — 81002 URINALYSIS NONAUTO W/O SCOPE: CPT | Mod: S$GLB,,, | Performed by: INTERNAL MEDICINE

## 2023-01-09 PROCEDURE — 3008F BODY MASS INDEX DOCD: CPT | Mod: CPTII,S$GLB,, | Performed by: OBSTETRICS & GYNECOLOGY

## 2023-01-09 PROCEDURE — 99213 OFFICE O/P EST LOW 20 MIN: CPT | Mod: 25,S$GLB,, | Performed by: OBSTETRICS & GYNECOLOGY

## 2023-01-09 PROCEDURE — 1159F MED LIST DOCD IN RCRD: CPT | Mod: CPTII,S$GLB,, | Performed by: OBSTETRICS & GYNECOLOGY

## 2023-01-09 PROCEDURE — 96372 PR INJECTION,THERAP/PROPH/DIAG2ST, IM OR SUBCUT: ICD-10-PCS | Mod: S$GLB,,, | Performed by: OBSTETRICS & GYNECOLOGY

## 2023-01-09 PROCEDURE — 96372 THER/PROPH/DIAG INJ SC/IM: CPT | Mod: S$GLB,,, | Performed by: OBSTETRICS & GYNECOLOGY

## 2023-01-09 PROCEDURE — 3079F DIAST BP 80-89 MM HG: CPT | Mod: CPTII,S$GLB,, | Performed by: OBSTETRICS & GYNECOLOGY

## 2023-01-09 PROCEDURE — 3074F SYST BP LT 130 MM HG: CPT | Mod: CPTII,S$GLB,, | Performed by: OBSTETRICS & GYNECOLOGY

## 2023-01-09 PROCEDURE — 3074F PR MOST RECENT SYSTOLIC BLOOD PRESSURE < 130 MM HG: ICD-10-PCS | Mod: CPTII,S$GLB,, | Performed by: OBSTETRICS & GYNECOLOGY

## 2023-01-09 PROCEDURE — 3079F PR MOST RECENT DIASTOLIC BLOOD PRESSURE 80-89 MM HG: ICD-10-PCS | Mod: CPTII,S$GLB,, | Performed by: OBSTETRICS & GYNECOLOGY

## 2023-01-09 PROCEDURE — 99213 PR OFFICE/OUTPT VISIT, EST, LEVL III, 20-29 MIN: ICD-10-PCS | Mod: 25,S$GLB,, | Performed by: OBSTETRICS & GYNECOLOGY

## 2023-01-09 PROCEDURE — 3008F PR BODY MASS INDEX (BMI) DOCUMENTED: ICD-10-PCS | Mod: CPTII,S$GLB,, | Performed by: OBSTETRICS & GYNECOLOGY

## 2023-01-09 PROCEDURE — 1159F PR MEDICATION LIST DOCUMENTED IN MEDICAL RECORD: ICD-10-PCS | Mod: CPTII,S$GLB,, | Performed by: OBSTETRICS & GYNECOLOGY

## 2023-01-09 PROCEDURE — 81002 POCT URINE DIPSTICK WITHOUT MICROSCOPE: ICD-10-PCS | Mod: S$GLB,,, | Performed by: INTERNAL MEDICINE

## 2023-01-09 RX ORDER — CEFTRIAXONE 1 G/1
1 INJECTION, POWDER, FOR SOLUTION INTRAMUSCULAR; INTRAVENOUS
Status: COMPLETED | OUTPATIENT
Start: 2023-01-09 | End: 2023-01-09

## 2023-01-09 RX ADMIN — CEFTRIAXONE 1 G: 1 INJECTION, POWDER, FOR SOLUTION INTRAMUSCULAR; INTRAVENOUS at 04:01

## 2023-01-10 ENCOUNTER — CLINICAL SUPPORT (OUTPATIENT)
Dept: OBSTETRICS AND GYNECOLOGY | Facility: CLINIC | Age: 58
End: 2023-01-10
Payer: COMMERCIAL

## 2023-01-10 VITALS
SYSTOLIC BLOOD PRESSURE: 120 MMHG | BODY MASS INDEX: 35.93 KG/M2 | DIASTOLIC BLOOD PRESSURE: 74 MMHG | WEIGHT: 183 LBS | HEIGHT: 60 IN

## 2023-01-10 DIAGNOSIS — R30.0 DYSURIA: Primary | ICD-10-CM

## 2023-01-10 LAB
BILIRUB SERPL-MCNC: ABNORMAL MG/DL
BLOOD URINE, POC: ABNORMAL
CLARITY, POC UA: ABNORMAL
COLOR, POC UA: ABNORMAL
GLUCOSE UR QL STRIP: ABNORMAL
KETONES UR QL STRIP: ABNORMAL
LEUKOCYTE ESTERASE URINE, POC: ABNORMAL
NITRITE, POC UA: ABNORMAL
PH, POC UA: 6.5
PROTEIN, POC: ABNORMAL
SPECIFIC GRAVITY, POC UA: 1.02
UROBILINOGEN, POC UA: ABNORMAL

## 2023-01-10 PROCEDURE — 96372 THER/PROPH/DIAG INJ SC/IM: CPT | Mod: S$GLB,,, | Performed by: OBSTETRICS & GYNECOLOGY

## 2023-01-10 PROCEDURE — 96372 PR INJECTION,THERAP/PROPH/DIAG2ST, IM OR SUBCUT: ICD-10-PCS | Mod: S$GLB,,, | Performed by: OBSTETRICS & GYNECOLOGY

## 2023-01-10 RX ORDER — CEFTRIAXONE 1 G/1
1 INJECTION, POWDER, FOR SOLUTION INTRAMUSCULAR; INTRAVENOUS
Status: COMPLETED | OUTPATIENT
Start: 2023-01-10 | End: 2023-01-10

## 2023-01-10 RX ADMIN — CEFTRIAXONE 1 G: 1 INJECTION, POWDER, FOR SOLUTION INTRAMUSCULAR; INTRAVENOUS at 09:01

## 2023-01-11 LAB
APPEARANCE UR: ABNORMAL
BACTERIA #/AREA URNS HPF: ABNORMAL /HPF
BACTERIA UR CULT: ABNORMAL
COLOR UR: ABNORMAL
HYALINE CASTS #/AREA URNS LPF: ABNORMAL /LPF
RBC #/AREA URNS HPF: ABNORMAL /HPF
SERVICE CMNT-IMP: ABNORMAL
SP GR UR STRIP: ABNORMAL
SQUAMOUS #/AREA URNS HPF: ABNORMAL /HPF
WBC #/AREA URNS HPF: ABNORMAL /HPF

## 2023-01-12 NOTE — PROGRESS NOTES
Subjective:       Patient ID: Adri Thomason is a 57 y.o. female.    Chief Complaint:  Abdominal Pain (aub)      History of Present Illness  Abdominal Pain  Associated symptoms include frequency. Pertinent negatives include no constipation, diarrhea or fever.   Patient reports that she is having bleeding with urination.  It started a few hours ago.  She is currently in pain.    GYN & OB History  No LMP recorded. Patient is postmenopausal.   Date of Last Pap: No result found    OB History    Para Term  AB Living   2 2 2     2   SAB IAB Ectopic Multiple Live Births           2      # Outcome Date GA Lbr Donny/2nd Weight Sex Delivery Anes PTL Lv   2 Term      CS-LTranv   ANGEL   1 Term      CS-LTranv   ANGEL       Review of Systems  Review of Systems   Constitutional:  Negative for activity change, appetite change, chills, diaphoresis, fatigue, fever and unexpected weight change.   Respiratory:  Negative for cough and shortness of breath.    Cardiovascular:  Negative for chest pain, palpitations and leg swelling.   Gastrointestinal:  Positive for abdominal pain. Negative for bloating, constipation and diarrhea.   Genitourinary:  Positive for frequency. Negative for vaginal bleeding, vaginal discharge, vaginal pain, vaginal dryness and vaginal odor.   Musculoskeletal:  Negative for back pain and joint swelling.   Integumentary:  Negative for rash and acne.   Psychiatric/Behavioral:  Negative for depression. The patient is not nervous/anxious.          Objective:    Physical Exam:   Constitutional: She is oriented to person, place, and time. Vital signs are normal. She appears well-developed and well-nourished. She is cooperative.      Neck: No thyroid mass and no thyromegaly present.    Cardiovascular:  Normal rate and normal pulses.             Pulmonary/Chest: Effort normal. No respiratory distress.        Abdominal: Soft. She exhibits no distension. There is no abdominal tenderness. No hernia.              Musculoskeletal: Moves all extremeties.       Neurological: She is alert and oriented to person, place, and time.    Skin: Skin is warm and dry. No rash noted.    Psychiatric: She has a normal mood and affect. Her speech is normal and behavior is normal. Judgment and thought content normal.        Assessment:        1. Dysuria                Plan:      Urinary tract infection: Late entry:  Patient received Rocephin additional dose in 24 hours.  At the 48 hour esperanza patient reported back that she was feeling well.

## 2023-01-19 ENCOUNTER — OFFICE VISIT (OUTPATIENT)
Dept: PRIMARY CARE CLINIC | Facility: CLINIC | Age: 58
End: 2023-01-19
Payer: COMMERCIAL

## 2023-01-19 VITALS
SYSTOLIC BLOOD PRESSURE: 114 MMHG | HEART RATE: 81 BPM | DIASTOLIC BLOOD PRESSURE: 79 MMHG | OXYGEN SATURATION: 98 % | WEIGHT: 184 LBS | HEIGHT: 60 IN | BODY MASS INDEX: 36.12 KG/M2 | TEMPERATURE: 98 F

## 2023-01-19 DIAGNOSIS — J45.50 SEVERE PERSISTENT ASTHMA, UNSPECIFIED WHETHER COMPLICATED: ICD-10-CM

## 2023-01-19 DIAGNOSIS — I10 BENIGN ESSENTIAL HTN: ICD-10-CM

## 2023-01-19 DIAGNOSIS — E03.9 HYPOTHYROIDISM, UNSPECIFIED TYPE: ICD-10-CM

## 2023-01-19 DIAGNOSIS — E11.9 TYPE 2 DIABETES MELLITUS WITHOUT COMPLICATION, WITHOUT LONG-TERM CURRENT USE OF INSULIN: Primary | ICD-10-CM

## 2023-01-19 PROCEDURE — 3074F PR MOST RECENT SYSTOLIC BLOOD PRESSURE < 130 MM HG: ICD-10-PCS | Mod: CPTII,S$GLB,, | Performed by: INTERNAL MEDICINE

## 2023-01-19 PROCEDURE — 3008F PR BODY MASS INDEX (BMI) DOCUMENTED: ICD-10-PCS | Mod: CPTII,S$GLB,, | Performed by: INTERNAL MEDICINE

## 2023-01-19 PROCEDURE — 1159F MED LIST DOCD IN RCRD: CPT | Mod: CPTII,S$GLB,, | Performed by: INTERNAL MEDICINE

## 2023-01-19 PROCEDURE — 1159F PR MEDICATION LIST DOCUMENTED IN MEDICAL RECORD: ICD-10-PCS | Mod: CPTII,S$GLB,, | Performed by: INTERNAL MEDICINE

## 2023-01-19 PROCEDURE — 99214 OFFICE O/P EST MOD 30 MIN: CPT | Mod: S$GLB,,, | Performed by: INTERNAL MEDICINE

## 2023-01-19 PROCEDURE — 3008F BODY MASS INDEX DOCD: CPT | Mod: CPTII,S$GLB,, | Performed by: INTERNAL MEDICINE

## 2023-01-19 PROCEDURE — 1160F PR REVIEW ALL MEDS BY PRESCRIBER/CLIN PHARMACIST DOCUMENTED: ICD-10-PCS | Mod: CPTII,S$GLB,, | Performed by: INTERNAL MEDICINE

## 2023-01-19 PROCEDURE — 99214 PR OFFICE/OUTPT VISIT, EST, LEVL IV, 30-39 MIN: ICD-10-PCS | Mod: S$GLB,,, | Performed by: INTERNAL MEDICINE

## 2023-01-19 PROCEDURE — 1160F RVW MEDS BY RX/DR IN RCRD: CPT | Mod: CPTII,S$GLB,, | Performed by: INTERNAL MEDICINE

## 2023-01-19 PROCEDURE — 3074F SYST BP LT 130 MM HG: CPT | Mod: CPTII,S$GLB,, | Performed by: INTERNAL MEDICINE

## 2023-01-19 PROCEDURE — 3078F PR MOST RECENT DIASTOLIC BLOOD PRESSURE < 80 MM HG: ICD-10-PCS | Mod: CPTII,S$GLB,, | Performed by: INTERNAL MEDICINE

## 2023-01-19 PROCEDURE — 3078F DIAST BP <80 MM HG: CPT | Mod: CPTII,S$GLB,, | Performed by: INTERNAL MEDICINE

## 2023-01-19 NOTE — PROGRESS NOTES
Subjective:      Patient ID: Adri Thomason is a 57 y.o. female.    Chief Complaint: Follow-up and Cystitis (Pt states she had a real bad bladder infection want to know how did she get a bladder infection )      Patient with h/o DM with last A1c = 7.6 worsen from 6.6. Patient has started using Mounjaro and that sees to be helping and suppressing appetite.  Patient has lost 20 lb in last 3 months.  Patient denies polyuria, polydipsia, numbness in hands or feet.  Home blood sugars are not being monitored    Has history of hypertension and is controlled with losartan only.  Patient denies any chest pain shortness of breath or ankle swelling.    Patient has asthma and it seems better controlled. Patient was evaluated by Dr. Arellano and is started on Dupexint injections with much help. Patient PFTs suggested restrictive pattern that was attributed to weight.  Patient also has obstructive sleep apnea and is using CPAP machine regularly.  Patient reports better energy after using the machine.     Patient was found to have cystitis with hematuria.  Patient was evaluated by gyn and was given ceftriaxone shot x 2 days.  Patient denies any more symptoms.         Review of Systems   Constitutional:  Positive for weight loss (10 lb weight loss). Negative for chills, diaphoresis, fever and malaise/fatigue.   HENT:  Negative for congestion, ear pain, sinus pain, sore throat and tinnitus.    Eyes:  Negative for blurred vision and photophobia.   Respiratory:  Negative for cough, hemoptysis, shortness of breath and wheezing.    Cardiovascular:  Negative for chest pain, palpitations, orthopnea, leg swelling and PND.   Gastrointestinal:  Negative for abdominal pain, blood in stool, constipation, diarrhea, heartburn, melena, nausea and vomiting.   Genitourinary:  Negative for dysuria, frequency and urgency.   Musculoskeletal:  Negative for back pain, myalgias and neck pain.   Skin:  Negative for rash.   Neurological:  Negative for  dizziness, tremors, seizures, loss of consciousness and weakness.   Endo/Heme/Allergies:  Negative for polydipsia.   Psychiatric/Behavioral:  Negative for depression and hallucinations. The patient does not have insomnia.    Objective:     Physical Exam  Constitutional:       General: She is not in acute distress.     Appearance: She is not diaphoretic.   Neck:      Thyroid: No thyromegaly.   Cardiovascular:      Rate and Rhythm: Normal rate and regular rhythm.      Heart sounds: Normal heart sounds. No murmur heard.  Pulmonary:      Effort: Pulmonary effort is normal. No respiratory distress.      Breath sounds: Normal breath sounds. No wheezing.   Chest:      Chest wall: No tenderness.   Abdominal:      General: Bowel sounds are normal. There is no distension.      Palpations: Abdomen is soft.      Tenderness: There is no abdominal tenderness.   Musculoskeletal:         General: No tenderness.   Lymphadenopathy:      Cervical: No cervical adenopathy.   Neurological:      Mental Status: She is alert and oriented to person, place, and time.   Psychiatric:         Behavior: Behavior normal.         Thought Content: Thought content normal.         Judgment: Judgment normal.     Assessment:       ICD-10-CM ICD-9-CM   1. Type 2 diabetes mellitus without complication, without long-term current use of insulin  E11.9 250.00   2. Severe persistent asthma, unspecified whether complicated  J45.50 493.90   3. Hypothyroidism, unspecified type  E03.9 244.9   4. Benign essential HTN  I10 401.1           Plan:     Patient last A1c of 7.6 worsen from 6.6  Patient is taking  Mounjaro.   And the medicine seem to suppress appetite + and is causing weight loss  Patient denies any family history of thyroid cancer + pancreatitis.  Will check CBC, CMP, A1c  Patient has previous history of vitamin-D deficiency repeat vitamin-D levels are ordered  Advised patient to get labs done  Patient asthma is markedly improved with addition of  Dupixent  Advised patient to continue medication  Patient has obstructive sleep apnea and that seem under good control  Patient blood pressure are under good control.  Will continue losartan  Patient had cystitis that was treated by gyn with ceftriaxone  Patient denies any urinary symptoms  Patient last TSH was suppressed and levothyroxine dose was adjusted  Will repeat thyroid function    Medication List with Changes/Refills   Current Medications    ALBUTEROL (VENTOLIN HFA) 90 MCG/ACTUATION INHALER    INHALE 2 PUFFS INTO THE LUNGS EVERY 4 (FOUR) HOURS AS NEEDED. RESCUE    ALBUTEROL-IPRATROPIUM (DUO-NEB) 2.5 MG-0.5 MG/3 ML NEBULIZER SOLUTION    INHALE 1 VIAL VIA NEBULIZER 4 TIMES A DAY AS NEEDED    ATORVASTATIN (LIPITOR) 20 MG TABLET    TAKE 1 TABLET BY MOUTH EVERY DAY    CALCIUM CARBONATE-VIT D3- MG CALCIUM- 400 UNIT TAB    Take 1 tablet by mouth once daily.    CLOBETASOL 0.05% (TEMOVATE) 0.05 % OINT    Apply topically 2 (two) times daily.    DUPIXENT  MG/2 ML PNIJ        EPINEPHRINE (EPIPEN) 0.3 MG/0.3 ML ATIN    USE 1 INJECTION AS DIRECTED AS NEEDED FOR EMERGENCY USE ONLY    ESTRADIOL (ESTRACE) 0.01 % (0.1 MG/GRAM) VAGINAL CREAM    Place 1 g vaginally twice a week.    FLUTICASONE FUROATE-VILANTEROL (BREO ELLIPTA) 200-25 MCG/DOSE DSDV DISKUS INHALER    Inhale 1 puff into the lungs once daily. Controller    FLUTICASONE PROPIONATE (FLONASE) 50 MCG/ACTUATION NASAL SPRAY    2 sprays by Each Nare route every evening.    INCRUSE ELLIPTA 62.5 MCG/ACTUATION INHALATION CAPSULE    INHALE 62.5 MCG INTO THE LUNGS ONCE DAILY. CONTROLLER    LEVOTHYROXINE (SYNTHROID) 100 MCG TABLET    Take 1 tablet (100 mcg total) by mouth before breakfast.    LOSARTAN (COZAAR) 50 MG TABLET    Take 1 tablet (50 mg total) by mouth once daily.    MONTELUKAST (SINGULAIR) 10 MG TABLET    TAKE 1 TABLET BY MOUTH EVERY DAY IN THE EVENING    TIRZEPATIDE (MOUNJARO) 2.5 MG/0.5 ML PNIJ    INJECT 2.5 MG INTO THE SKIN EVERY 7 DAYS.

## 2023-01-21 LAB
25(OH)D3 SERPL-MCNC: 79 NG/ML (ref 30–100)
ALBUMIN SERPL-MCNC: 3.8 G/DL (ref 3.6–5.1)
ALBUMIN/GLOB SERPL: 1.4 (CALC) (ref 1–2.5)
ALP SERPL-CCNC: 60 U/L (ref 37–153)
ALT SERPL-CCNC: 23 U/L (ref 6–29)
AST SERPL-CCNC: 21 U/L (ref 10–35)
BILIRUB SERPL-MCNC: 0.4 MG/DL (ref 0.2–1.2)
BUN SERPL-MCNC: 15 MG/DL (ref 7–25)
BUN/CREAT SERPL: NORMAL (CALC) (ref 6–22)
CALCIUM SERPL-MCNC: 9.3 MG/DL (ref 8.6–10.4)
CHLORIDE SERPL-SCNC: 105 MMOL/L (ref 98–110)
CO2 SERPL-SCNC: 30 MMOL/L (ref 20–32)
CREAT SERPL-MCNC: 0.58 MG/DL (ref 0.5–1.03)
EGFR: 107 ML/MIN/1.73M2
GLOBULIN SER CALC-MCNC: 2.7 G/DL (CALC) (ref 1.9–3.7)
GLUCOSE SERPL-MCNC: 95 MG/DL (ref 65–99)
HBA1C MFR BLD: 5.3 % OF TOTAL HGB
POTASSIUM SERPL-SCNC: 4.2 MMOL/L (ref 3.5–5.3)
PROT SERPL-MCNC: 6.5 G/DL (ref 6.1–8.1)
SODIUM SERPL-SCNC: 140 MMOL/L (ref 135–146)
TSH SERPL-ACNC: 3.22 MIU/L

## 2023-01-31 ENCOUNTER — TELEPHONE (OUTPATIENT)
Dept: PULMONOLOGY | Facility: CLINIC | Age: 58
End: 2023-01-31
Payer: COMMERCIAL

## 2023-02-27 ENCOUNTER — TELEPHONE (OUTPATIENT)
Dept: PULMONOLOGY | Facility: CLINIC | Age: 58
End: 2023-02-27
Payer: COMMERCIAL

## 2023-03-06 ENCOUNTER — OFFICE VISIT (OUTPATIENT)
Dept: PULMONOLOGY | Facility: CLINIC | Age: 58
End: 2023-03-06
Payer: COMMERCIAL

## 2023-03-06 VITALS
RESPIRATION RATE: 17 BRPM | BODY MASS INDEX: 35.22 KG/M2 | DIASTOLIC BLOOD PRESSURE: 78 MMHG | OXYGEN SATURATION: 99 % | HEIGHT: 60 IN | SYSTOLIC BLOOD PRESSURE: 142 MMHG | WEIGHT: 179.38 LBS | HEART RATE: 74 BPM

## 2023-03-06 DIAGNOSIS — J45.909 SEVERE ASTHMA WITHOUT COMPLICATION, UNSPECIFIED WHETHER PERSISTENT: ICD-10-CM

## 2023-03-06 DIAGNOSIS — G47.33 OSA ON CPAP: Primary | ICD-10-CM

## 2023-03-06 PROCEDURE — 3044F PR MOST RECENT HEMOGLOBIN A1C LEVEL <7.0%: ICD-10-PCS | Mod: CPTII,S$GLB,, | Performed by: INTERNAL MEDICINE

## 2023-03-06 PROCEDURE — 3078F DIAST BP <80 MM HG: CPT | Mod: CPTII,S$GLB,, | Performed by: INTERNAL MEDICINE

## 2023-03-06 PROCEDURE — 1160F PR REVIEW ALL MEDS BY PRESCRIBER/CLIN PHARMACIST DOCUMENTED: ICD-10-PCS | Mod: CPTII,S$GLB,, | Performed by: INTERNAL MEDICINE

## 2023-03-06 PROCEDURE — 1160F RVW MEDS BY RX/DR IN RCRD: CPT | Mod: CPTII,S$GLB,, | Performed by: INTERNAL MEDICINE

## 2023-03-06 PROCEDURE — 3077F PR MOST RECENT SYSTOLIC BLOOD PRESSURE >= 140 MM HG: ICD-10-PCS | Mod: CPTII,S$GLB,, | Performed by: INTERNAL MEDICINE

## 2023-03-06 PROCEDURE — 4010F ACE/ARB THERAPY RXD/TAKEN: CPT | Mod: CPTII,S$GLB,, | Performed by: INTERNAL MEDICINE

## 2023-03-06 PROCEDURE — 3008F PR BODY MASS INDEX (BMI) DOCUMENTED: ICD-10-PCS | Mod: CPTII,S$GLB,, | Performed by: INTERNAL MEDICINE

## 2023-03-06 PROCEDURE — 1159F MED LIST DOCD IN RCRD: CPT | Mod: CPTII,S$GLB,, | Performed by: INTERNAL MEDICINE

## 2023-03-06 PROCEDURE — 3008F BODY MASS INDEX DOCD: CPT | Mod: CPTII,S$GLB,, | Performed by: INTERNAL MEDICINE

## 2023-03-06 PROCEDURE — 3044F HG A1C LEVEL LT 7.0%: CPT | Mod: CPTII,S$GLB,, | Performed by: INTERNAL MEDICINE

## 2023-03-06 PROCEDURE — 4010F PR ACE/ARB THEARPY RXD/TAKEN: ICD-10-PCS | Mod: CPTII,S$GLB,, | Performed by: INTERNAL MEDICINE

## 2023-03-06 PROCEDURE — 3077F SYST BP >= 140 MM HG: CPT | Mod: CPTII,S$GLB,, | Performed by: INTERNAL MEDICINE

## 2023-03-06 PROCEDURE — 99214 PR OFFICE/OUTPT VISIT, EST, LEVL IV, 30-39 MIN: ICD-10-PCS | Mod: S$GLB,,, | Performed by: INTERNAL MEDICINE

## 2023-03-06 PROCEDURE — 99214 OFFICE O/P EST MOD 30 MIN: CPT | Mod: S$GLB,,, | Performed by: INTERNAL MEDICINE

## 2023-03-06 PROCEDURE — 3078F PR MOST RECENT DIASTOLIC BLOOD PRESSURE < 80 MM HG: ICD-10-PCS | Mod: CPTII,S$GLB,, | Performed by: INTERNAL MEDICINE

## 2023-03-06 PROCEDURE — 1159F PR MEDICATION LIST DOCUMENTED IN MEDICAL RECORD: ICD-10-PCS | Mod: CPTII,S$GLB,, | Performed by: INTERNAL MEDICINE

## 2023-03-06 NOTE — PROGRESS NOTES
Subjective:    Patient Identification:   Patient ID: Adri Thomason is a 57 y.o. female.    Referring Provider:  Established patient    Chief Complaint:  Sleep Apnea and Asthma      History of Present Illness:    Adri Thomason is a 57 y.o. female who presents for the follow-up of above-mentioned chronic conditions.  Her asthma control is excellent with Dupixent, Incruse and Breo.  She did not have to use her rescue inhaler for a long time now.  No systemic steroids since started on Dupixent in September 2022.  She is compliant with CPAP and is getting significant benefit from using it.    Review of Systems:  Review of Systems   Constitutional:  Negative for fever, chills, weight loss, weight gain, activity change, appetite change, fatigue, night sweats and weakness.   HENT:  Negative for nosebleeds, postnasal drip, rhinorrhea, sinus pressure, sore throat, trouble swallowing, voice change, congestion, ear pain and hearing loss.    Eyes:  Negative for redness and itching.   Respiratory:  Negative for cough, hemoptysis, sputum production, choking, chest tightness, shortness of breath, wheezing, orthopnea, previous hospitialization due to pulmonary problems, asthma nighttime symptoms, pleurisy, dyspnea on extertion, use of rescue inhaler and Paroxysmal Nocturnal Dyspnea.    Cardiovascular:  Negative for chest pain, palpitations and leg swelling.   Genitourinary:  Negative for difficulty urinating and hematuria.   Endocrine:  Negative for polydipsia, polyphagia, cold intolerance, heat intolerance and polyuria.    Musculoskeletal:  Negative for arthralgias, back pain, gait problem, joint swelling and myalgias.   Skin:  Negative for rash.   Gastrointestinal:  Negative for nausea, vomiting, abdominal pain, abdominal distention and acid reflux.   Neurological:  Negative for dizziness, syncope, weakness, light-headedness and headaches.   Hematological:  Negative for adenopathy. Does not bruise/bleed easily and no excessive  bruising.   Psychiatric/Behavioral:  Negative for confusion and sleep disturbance. The patient is not nervous/anxious.        Allergies:   Review of patient's allergies indicates:   Allergen Reactions    Codeine Hives    Nsaids (non-steroidal anti-inflammatory drug) Anaphylaxis, Hives and Shortness Of Breath    Sulfa (sulfonamide antibiotics) Rash       Medications:      Past Medical History:      Past Medical History:   Diagnosis Date    Allergy     Asthma     HTN (hypertension)        Family History:      Family History   Problem Relation Age of Onset    Diabetes Mother     Diabetes Father     Asthma Father     Asthma Sister     Asthma Paternal Aunt     Asthma Paternal Grandmother     Asthma Paternal Aunt     Asthma Paternal Aunt     Asthma Paternal Aunt     Asthma Paternal Aunt     Asthma Paternal Aunt         Social History:      Past Surgical History:   Procedure Laterality Date     SECTION      x2       Physical Exam:  Vitals:    23 1037   BP: (!) 142/78   Pulse: 74   Resp: 17     Physical Exam   Constitutional: She is oriented to person, place, and time. She appears not cachectic. No distress. She is obese.   HENT:   Head: Normocephalic.   Right Ear: External ear normal.   Left Ear: External ear normal.   Nose: Nose normal. No mucosal edema. No polyps.   Mouth/Throat: Oropharynx is clear and moist. Normal dentition. No oropharyngeal exudate.   Neck: No JVD present. No tracheal deviation present. No thyromegaly present.   Cardiovascular: Normal rate, regular rhythm, normal heart sounds and intact distal pulses. Exam reveals no gallop and no friction rub.   No murmur heard.  Pulmonary/Chest: Normal expansion, symmetric chest wall expansion, effort normal and breath sounds normal. No stridor. No respiratory distress. She has no decreased breath sounds. She has no wheezes. She has no rhonchi. She has no rales. Chest wall is not dull to percussion. She exhibits no tenderness. Negative for egophony.  Negative for tactile fremitus.   Abdominal: Soft. Bowel sounds are normal. She exhibits no distension and no mass. There is no hepatosplenomegaly. There is no abdominal tenderness. There is no rebound and no guarding. No hernia.   Musculoskeletal:         General: No tenderness, deformity or edema. Normal range of motion.      Cervical back: Normal range of motion and neck supple.   Lymphadenopathy: No supraclavicular adenopathy is present.     She has no cervical adenopathy.     She has no axillary adenopathy.   Neurological: She is alert and oriented to person, place, and time. She has normal reflexes. She displays normal reflexes. No cranial nerve deficit. She exhibits normal muscle tone.   Skin: Skin is warm and dry. No rash noted. She is not diaphoretic. No cyanosis or erythema. No pallor. Nails show no clubbing.   Psychiatric: She has a normal mood and affect. Her behavior is normal. Judgment and thought content normal.          Accessory Clinical Data:  Chest x-ray:    CT scan:     PFTs:     6MWT:     TTE:    Lab data:    All radiographic imaging of the chest, PFT tracings/data, and 6MWT data have been independently reviewed and interpreted unless otherwise specified.     Assessment and Plan:        Problem List Items Addressed This Visit          Pulmonary    Asthma    Relevant Orders    Spirometry with/without bronchodilator     Other Visit Diagnoses       [place] on CPAP    -  Primary           Continue with current therapy including Dupixent, Incruse and Breo.    Keep up-to-date with preventative seasonal and age-appropriate vaccinations.    Continue to use CPAP as you are using.      Continue to avoid triggers as you are doing.  Repeat spirometry and check for peripheral eosinophilia in 4 months (Dupixent can worsen peripheral eosinophilia).      Follow up in about 4 months (around 7/6/2023).     This note is dictated on M*Modal word recognition program.  There are word recognition mistakes that are  occasionally missed on review.

## 2023-04-25 ENCOUNTER — OFFICE VISIT (OUTPATIENT)
Dept: PRIMARY CARE CLINIC | Facility: CLINIC | Age: 58
End: 2023-04-25
Payer: COMMERCIAL

## 2023-04-25 VITALS
SYSTOLIC BLOOD PRESSURE: 120 MMHG | OXYGEN SATURATION: 99 % | WEIGHT: 176 LBS | HEIGHT: 60 IN | HEART RATE: 63 BPM | DIASTOLIC BLOOD PRESSURE: 86 MMHG | RESPIRATION RATE: 16 BRPM | TEMPERATURE: 97 F | BODY MASS INDEX: 34.55 KG/M2

## 2023-04-25 DIAGNOSIS — J45.50 SEVERE PERSISTENT ASTHMA, UNSPECIFIED WHETHER COMPLICATED: ICD-10-CM

## 2023-04-25 DIAGNOSIS — E11.9 TYPE 2 DIABETES MELLITUS WITHOUT COMPLICATION, WITHOUT LONG-TERM CURRENT USE OF INSULIN: ICD-10-CM

## 2023-04-25 DIAGNOSIS — E03.9 HYPOTHYROIDISM, UNSPECIFIED TYPE: ICD-10-CM

## 2023-04-25 DIAGNOSIS — Z00.00 ROUTINE GENERAL MEDICAL EXAMINATION AT A HEALTH CARE FACILITY: Primary | ICD-10-CM

## 2023-04-25 DIAGNOSIS — I10 BENIGN ESSENTIAL HTN: ICD-10-CM

## 2023-04-25 PROCEDURE — 3044F HG A1C LEVEL LT 7.0%: CPT | Mod: CPTII,S$GLB,, | Performed by: INTERNAL MEDICINE

## 2023-04-25 PROCEDURE — 3079F PR MOST RECENT DIASTOLIC BLOOD PRESSURE 80-89 MM HG: ICD-10-PCS | Mod: CPTII,S$GLB,, | Performed by: INTERNAL MEDICINE

## 2023-04-25 PROCEDURE — 3008F BODY MASS INDEX DOCD: CPT | Mod: CPTII,S$GLB,, | Performed by: INTERNAL MEDICINE

## 2023-04-25 PROCEDURE — 1160F RVW MEDS BY RX/DR IN RCRD: CPT | Mod: CPTII,S$GLB,, | Performed by: INTERNAL MEDICINE

## 2023-04-25 PROCEDURE — 4010F PR ACE/ARB THEARPY RXD/TAKEN: ICD-10-PCS | Mod: CPTII,S$GLB,, | Performed by: INTERNAL MEDICINE

## 2023-04-25 PROCEDURE — 3044F PR MOST RECENT HEMOGLOBIN A1C LEVEL <7.0%: ICD-10-PCS | Mod: CPTII,S$GLB,, | Performed by: INTERNAL MEDICINE

## 2023-04-25 PROCEDURE — 3074F PR MOST RECENT SYSTOLIC BLOOD PRESSURE < 130 MM HG: ICD-10-PCS | Mod: CPTII,S$GLB,, | Performed by: INTERNAL MEDICINE

## 2023-04-25 PROCEDURE — 1159F MED LIST DOCD IN RCRD: CPT | Mod: CPTII,S$GLB,, | Performed by: INTERNAL MEDICINE

## 2023-04-25 PROCEDURE — 3008F PR BODY MASS INDEX (BMI) DOCUMENTED: ICD-10-PCS | Mod: CPTII,S$GLB,, | Performed by: INTERNAL MEDICINE

## 2023-04-25 PROCEDURE — 3074F SYST BP LT 130 MM HG: CPT | Mod: CPTII,S$GLB,, | Performed by: INTERNAL MEDICINE

## 2023-04-25 PROCEDURE — 99396 PREV VISIT EST AGE 40-64: CPT | Mod: S$GLB,,, | Performed by: INTERNAL MEDICINE

## 2023-04-25 PROCEDURE — 3079F DIAST BP 80-89 MM HG: CPT | Mod: CPTII,S$GLB,, | Performed by: INTERNAL MEDICINE

## 2023-04-25 PROCEDURE — 1160F PR REVIEW ALL MEDS BY PRESCRIBER/CLIN PHARMACIST DOCUMENTED: ICD-10-PCS | Mod: CPTII,S$GLB,, | Performed by: INTERNAL MEDICINE

## 2023-04-25 PROCEDURE — 1159F PR MEDICATION LIST DOCUMENTED IN MEDICAL RECORD: ICD-10-PCS | Mod: CPTII,S$GLB,, | Performed by: INTERNAL MEDICINE

## 2023-04-25 PROCEDURE — 4010F ACE/ARB THERAPY RXD/TAKEN: CPT | Mod: CPTII,S$GLB,, | Performed by: INTERNAL MEDICINE

## 2023-04-25 PROCEDURE — 99396 PR PREVENTIVE VISIT,EST,40-64: ICD-10-PCS | Mod: S$GLB,,, | Performed by: INTERNAL MEDICINE

## 2023-04-25 RX ORDER — TIRZEPATIDE 5 MG/.5ML
5 INJECTION, SOLUTION SUBCUTANEOUS
Qty: 4 PEN | Refills: 1 | Status: SHIPPED | OUTPATIENT
Start: 2023-04-25 | End: 2023-06-12

## 2023-04-25 RX ORDER — ERGOCALCIFEROL 1.25 MG/1
50000 CAPSULE ORAL
COMMUNITY
Start: 2023-03-10 | End: 2023-04-25

## 2023-04-25 NOTE — PROGRESS NOTES
Subjective:      Patient ID: Adri Thomason is a 57 y.o. female.    Chief Complaint: Diabetes     Diabetes with last A1c of 5.3 is at goal.  Patient is on Mounjaro and that seems to be helping a lot.  Patient appetite is suppressed and has lost more than 20 lb.  Patient was not adherent to diet.  Patient denies polyuria polydipsia, numbness in hands or feet.  Patient reports having some cramps in feet and legs, mostly in the evening or night.  The symptoms are mostly at rest and not on walking.    Patient has hypertension and blood pressure seem under good control with losartan.  Patient diastolic blood pressure seem to be a little higher.     Patient has asthma and that seem under good control After patient is started on Dupixent by pulmonologist/Dr. Arellano.       Review of Systems   Constitutional:  Negative for chills, diaphoresis, fever, malaise/fatigue and weight loss.   HENT:  Negative for congestion, ear pain, sinus pain, sore throat and tinnitus.    Eyes:  Negative for blurred vision and photophobia.   Respiratory:  Negative for cough, hemoptysis, shortness of breath and wheezing.    Cardiovascular:  Negative for chest pain, palpitations, orthopnea, leg swelling and PND.   Gastrointestinal:  Negative for abdominal pain, blood in stool, constipation, diarrhea, heartburn, melena, nausea and vomiting.   Genitourinary:  Negative for dysuria, frequency and urgency.   Musculoskeletal:  Negative for back pain, myalgias and neck pain.   Skin:  Negative for rash.   Neurological:  Negative for dizziness, tremors, seizures, loss of consciousness and weakness.   Endo/Heme/Allergies:  Negative for polydipsia.   Psychiatric/Behavioral:  Negative for depression and hallucinations. The patient does not have insomnia.    Objective:     /86 (BP Location: Left arm, Patient Position: Sitting, BP Method: Large (Manual))   Pulse 63   Temp 97.4 °F (36.3 °C) (Temporal)   Resp 16   Ht 5' (1.524 m)   Wt 79.8 kg (176 lb)    SpO2 99%   BMI 34.37 kg/m²     Physical Exam  Constitutional:       General: She is not in acute distress.     Appearance: She is not diaphoretic.   Neck:      Thyroid: No thyromegaly.   Cardiovascular:      Rate and Rhythm: Normal rate and regular rhythm.      Heart sounds: Normal heart sounds. No murmur heard.  Pulmonary:      Effort: Pulmonary effort is normal. No respiratory distress.      Breath sounds: Normal breath sounds. No wheezing.   Abdominal:      General: Bowel sounds are normal. There is no distension.      Palpations: Abdomen is soft.      Tenderness: There is no abdominal tenderness.   Lymphadenopathy:      Cervical: No cervical adenopathy.   Neurological:      Mental Status: She is alert and oriented to person, place, and time.   Psychiatric:         Behavior: Behavior normal.         Thought Content: Thought content normal.         Judgment: Judgment normal.     Assessment:       ICD-10-CM ICD-9-CM   1. Type 2 diabetes mellitus without complication, without long-term current use of insulin  E11.9 250.00   2. Benign essential HTN  I10 401.1   3. Hypothyroidism, unspecified type  E03.9 244.9   4. Severe persistent asthma, unspecified whether complicated  J45.50 493.90       Plan:     Patient is here for wellness exam.  Will order annual labs  Advised patient to get eye exam done  Patient blood sugars seem under good control and is losing weight with Mounjaro  Will increase the dose to 5 mg  Patient blood pressure is slightly high specially diastolic blood pressure  Advised patient to monitor blood pressures at home and call my office if diastolic blood pressure stay higher than 80  Patient last TSH was at goal.  Will repeat thyroid function  Patient asthma symptoms are much better controlled after starting Dupixent  Patient weight loss has help patient markedly as well  Patient has obstructive sleep apnea and is tolerating CPAP machine  Patient reports using CPAP machine regularly    Medication  List with Changes/Refills   New Medications    TIRZEPATIDE (MOUNJARO) 5 MG/0.5 ML PNIJ    Inject 5 mg into the skin every 7 days.   Current Medications    ALBUTEROL (VENTOLIN HFA) 90 MCG/ACTUATION INHALER    INHALE 2 PUFFS INTO THE LUNGS EVERY 4 (FOUR) HOURS AS NEEDED. RESCUE    ALBUTEROL-IPRATROPIUM (DUO-NEB) 2.5 MG-0.5 MG/3 ML NEBULIZER SOLUTION    INHALE 1 VIAL VIA NEBULIZER 4 TIMES A DAY AS NEEDED    ATORVASTATIN (LIPITOR) 20 MG TABLET    TAKE 1 TABLET BY MOUTH EVERY DAY    BREO ELLIPTA 200-25 MCG/DOSE DSDV DISKUS INHALER    INHALE 1 PUFF INTO THE LUNGS ONCE DAILY    CALCIUM CARBONATE-VIT D3- MG CALCIUM- 400 UNIT TAB    Take 1 tablet by mouth once daily.    CLOBETASOL 0.05% (TEMOVATE) 0.05 % OINT    Apply topically 2 (two) times daily.    DUPIXENT  MG/2 ML PNIJ        EPINEPHRINE (EPIPEN) 0.3 MG/0.3 ML ATIN    USE 1 INJECTION AS DIRECTED AS NEEDED FOR EMERGENCY USE ONLY    ERGOCALCIFEROL (ERGOCALCIFEROL) 50,000 UNIT CAP    Take 50,000 Units by mouth every 7 days.    ESTRADIOL (ESTRACE) 0.01 % (0.1 MG/GRAM) VAGINAL CREAM    Place 1 g vaginally twice a week.    FLUTICASONE PROPIONATE (FLONASE) 50 MCG/ACTUATION NASAL SPRAY    2 sprays by Each Nare route every evening.    INCRUSE ELLIPTA 62.5 MCG/ACTUATION INHALATION CAPSULE    INHALE 62.5 MCG INTO THE LUNGS ONCE DAILY. CONTROLLER    LEVOTHYROXINE (SYNTHROID) 100 MCG TABLET    Take 1 tablet (100 mcg total) by mouth before breakfast.    LOSARTAN (COZAAR) 50 MG TABLET    TAKE 1 TABLET BY MOUTH EVERY DAY    MONTELUKAST (SINGULAIR) 10 MG TABLET    TAKE 1 TABLET BY MOUTH EVERY DAY IN THE EVENING   Discontinued Medications    TIRZEPATIDE (MOUNJARO) 2.5 MG/0.5 ML PNIJ    INJECT 2.5 MG INTO THE SKIN EVERY 7 DAYS.

## 2023-04-26 ENCOUNTER — TELEPHONE (OUTPATIENT)
Dept: PRIMARY CARE CLINIC | Facility: CLINIC | Age: 58
End: 2023-04-26
Payer: COMMERCIAL

## 2023-04-28 LAB
ALBUMIN/GLOB SERPL ELPH: 1.5 {RATIO} (ref 1–2.7)
ALBUMIN: 4.4 (ref 3.5–5.2)
ALP SERPL-CCNC: 80 U/L (ref 35–105)
ALT SERPL W P-5'-P-CCNC: 18 U/L (ref 0–33)
ANION GAP SERPL CALC-SCNC: 10 MMOL/L (ref 8–17)
AST SERPL-CCNC: 19 U/L (ref 0–32)
BILIRUB SERPL-MCNC: 0.4 MG/DL (ref 0–1.2)
BUN SERPL-MCNC: 17.5 MG/DL (ref 6–20)
BUN/CREAT SERPL: 24.3 (ref 6–20)
CALCIUM SERPL-MCNC: 10 MG/DL (ref 8.6–10.2)
CHLORIDE SERPL-SCNC: 102 MMOL/L (ref 98–107)
CHOLEST SERPL-MSCNC: 152 MG/DL (ref 100–200)
CO2 SERPL-SCNC: 29 MMOL/L (ref 22–29)
CREAT SERPL-MCNC: 0.7 MG/DL (ref 0.5–0.9)
CREATININE RANDOM URINE: 110.5 MG/DL (ref 28–217)
GFR ESTIMATION: 98.68
GLOBULIN: 3 (ref 1.5–4.5)
GLUCOSE SERPL-MCNC: 106 MG/DL (ref 74–106)
HBA1C MFR BLD: 5.7 % (ref 4–6)
HDLC SERPL-MCNC: 68 MG/DL
LDL/HDL RATIO: 1 (ref 1–3)
LDLC SERPL CALC-MCNC: 68 MG/DL (ref 0–100)
MICROALBUMIN QUANT: 1.2
POTASSIUM SERPL-SCNC: 4.6 MMOL/L (ref 3.5–5.1)
PROT SNV-MCNC: 7.4 G/L (ref 6.4–8.3)
SODIUM BLD-SCNC: 141 MMOL/L (ref 136–145)
TRIGL SERPL-MCNC: 82 MG/DL (ref 0–150)

## 2023-05-08 ENCOUNTER — TELEPHONE (OUTPATIENT)
Dept: PRIMARY CARE CLINIC | Facility: CLINIC | Age: 58
End: 2023-05-08
Payer: COMMERCIAL

## 2023-05-08 DIAGNOSIS — E03.9 HYPOTHYROIDISM, UNSPECIFIED TYPE: Primary | ICD-10-CM

## 2023-05-08 RX ORDER — LEVOTHYROXINE SODIUM 112 UG/1
112 TABLET ORAL
Qty: 30 TABLET | Refills: 11 | Status: SHIPPED | OUTPATIENT
Start: 2023-05-08 | End: 2024-05-07

## 2023-05-08 NOTE — TELEPHONE ENCOUNTER
Phoned patient, gave lab results, TSH is High, has pt missed levothyroxine? Patient states she has not missed a dose and takes it every morning at 6am. Please advise.

## 2023-05-09 DIAGNOSIS — J45.50 SEVERE PERSISTENT ASTHMA WITH ALLERGIC RHINITIS, UNSPECIFIED WHETHER COMPLICATED: ICD-10-CM

## 2023-05-09 RX ORDER — UMECLIDINIUM 62.5 UG/1
AEROSOL, POWDER ORAL
Qty: 30 EACH | Refills: 2 | Status: SHIPPED | OUTPATIENT
Start: 2023-05-09 | End: 2023-08-30 | Stop reason: SDUPTHER

## 2023-05-16 ENCOUNTER — PATIENT OUTREACH (OUTPATIENT)
Dept: ADMINISTRATIVE | Facility: HOSPITAL | Age: 58
End: 2023-05-16
Payer: COMMERCIAL

## 2023-05-22 ENCOUNTER — PATIENT OUTREACH (OUTPATIENT)
Dept: ADMINISTRATIVE | Facility: HOSPITAL | Age: 58
End: 2023-05-22
Payer: COMMERCIAL

## 2023-05-22 NOTE — PROGRESS NOTES
Working DM Eye Report:     Patient overdue for DM eye exam.  Unable to reach, left message for a return call. No records found.

## 2023-05-23 ENCOUNTER — CLINICAL SUPPORT (OUTPATIENT)
Dept: OBSTETRICS AND GYNECOLOGY | Facility: CLINIC | Age: 58
End: 2023-05-23
Payer: COMMERCIAL

## 2023-05-23 VITALS
DIASTOLIC BLOOD PRESSURE: 84 MMHG | SYSTOLIC BLOOD PRESSURE: 132 MMHG | BODY MASS INDEX: 34.8 KG/M2 | HEART RATE: 87 BPM | WEIGHT: 177.25 LBS | HEIGHT: 60 IN

## 2023-05-23 DIAGNOSIS — N39.0 URINARY TRACT INFECTION WITHOUT HEMATURIA, SITE UNSPECIFIED: Primary | ICD-10-CM

## 2023-05-23 LAB
BILIRUB SERPL-MCNC: NEGATIVE MG/DL
BLOOD URINE, POC: ABNORMAL
CLARITY, POC UA: CLEAR
COLOR, POC UA: YELLOW
GLUCOSE UR QL STRIP: NEGATIVE
KETONES UR QL STRIP: NEGATIVE
LEUKOCYTE ESTERASE URINE, POC: ABNORMAL
NITRITE, POC UA: NEGATIVE
PH, POC UA: 6.5
PROTEIN, POC: NEGATIVE
SPECIFIC GRAVITY, POC UA: 1.01
UROBILINOGEN, POC UA: 0.2

## 2023-05-23 PROCEDURE — 96372 PR INJECTION,THERAP/PROPH/DIAG2ST, IM OR SUBCUT: ICD-10-PCS | Mod: S$GLB,,, | Performed by: OBSTETRICS & GYNECOLOGY

## 2023-05-23 PROCEDURE — 81002 URINALYSIS NONAUTO W/O SCOPE: CPT | Mod: S$GLB,,, | Performed by: OBSTETRICS & GYNECOLOGY

## 2023-05-23 PROCEDURE — 81002 POCT URINE DIPSTICK WITHOUT MICROSCOPE: ICD-10-PCS | Mod: S$GLB,,, | Performed by: OBSTETRICS & GYNECOLOGY

## 2023-05-23 PROCEDURE — 96372 THER/PROPH/DIAG INJ SC/IM: CPT | Mod: S$GLB,,, | Performed by: OBSTETRICS & GYNECOLOGY

## 2023-05-23 RX ADMIN — CEFTRIAXONE 1 G: 1 INJECTION, POWDER, FOR SOLUTION INTRAMUSCULAR; INTRAVENOUS at 01:05

## 2023-05-24 ENCOUNTER — CLINICAL SUPPORT (OUTPATIENT)
Dept: OBSTETRICS AND GYNECOLOGY | Facility: CLINIC | Age: 58
End: 2023-05-24
Payer: COMMERCIAL

## 2023-05-24 VITALS — BODY MASS INDEX: 34.75 KG/M2 | HEIGHT: 60 IN | WEIGHT: 177 LBS

## 2023-05-24 DIAGNOSIS — N20.0 NEPHROLITHIASIS: Primary | ICD-10-CM

## 2023-05-24 PROCEDURE — 96372 THER/PROPH/DIAG INJ SC/IM: CPT | Mod: S$GLB,,, | Performed by: OBSTETRICS & GYNECOLOGY

## 2023-05-24 PROCEDURE — 96372 PR INJECTION,THERAP/PROPH/DIAG2ST, IM OR SUBCUT: ICD-10-PCS | Mod: S$GLB,,, | Performed by: OBSTETRICS & GYNECOLOGY

## 2023-05-24 RX ORDER — TAMSULOSIN HYDROCHLORIDE 0.4 MG/1
0.4 CAPSULE ORAL DAILY
Qty: 30 CAPSULE | Refills: 11 | Status: SHIPPED | OUTPATIENT
Start: 2023-05-24 | End: 2023-08-01

## 2023-05-24 RX ORDER — CEFTRIAXONE 1 G/1
1 INJECTION, POWDER, FOR SOLUTION INTRAMUSCULAR; INTRAVENOUS
Status: COMPLETED | OUTPATIENT
Start: 2023-05-24 | End: 2023-05-24

## 2023-05-24 RX ORDER — CEFTRIAXONE 1 G/1
1 INJECTION, POWDER, FOR SOLUTION INTRAMUSCULAR; INTRAVENOUS
Status: COMPLETED | OUTPATIENT
Start: 2023-05-24 | End: 2023-05-23

## 2023-05-24 RX ADMIN — CEFTRIAXONE 1 G: 1 INJECTION, POWDER, FOR SOLUTION INTRAMUSCULAR; INTRAVENOUS at 01:05

## 2023-05-24 NOTE — PROGRESS NOTES
Late entry:  Patient presented initially with complaints of blood in urine.  This was confirmed on urinalysis.  Patient also with dysuria and urinary frequency.  However overnight patient past few renal stones.  She reports she was in severe pain was worse than labor with her 2 children.  Plan on urology referral for further counseling and management of nephrolithiasis.  Supportive care discussed.

## 2023-05-25 ENCOUNTER — PATIENT OUTREACH (OUTPATIENT)
Dept: ADMINISTRATIVE | Facility: HOSPITAL | Age: 58
End: 2023-05-25
Payer: COMMERCIAL

## 2023-05-25 NOTE — PROGRESS NOTES
Eye Exam: LPN CC outreached on 5/22/23. See below message.       May 22, 2023  Izabela Rodriguez, EMILIANO     SR     1:43 PM  Note     Working DM Eye Report:      Patient overdue for DM eye exam.  Unable to reach, left message for a return call. No records found.

## 2023-05-30 DIAGNOSIS — N39.0 URINARY TRACT INFECTION WITH HEMATURIA, SITE UNSPECIFIED: ICD-10-CM

## 2023-05-30 DIAGNOSIS — R31.9 URINARY TRACT INFECTION WITH HEMATURIA, SITE UNSPECIFIED: ICD-10-CM

## 2023-05-30 DIAGNOSIS — R10.9 ABDOMINAL PAIN, UNSPECIFIED ABDOMINAL LOCATION: Primary | ICD-10-CM

## 2023-05-30 NOTE — PROGRESS NOTES
Patient is evaluated in gyn Clinic with UTI x twice  Patient was having severe abdominal pain + had gross hematuria  Will order CT abdomen pelvis to rule out renal stones  Patient is referred to urologist as well.

## 2023-06-07 DIAGNOSIS — E03.9 HYPOTHYROIDISM, UNSPECIFIED TYPE: ICD-10-CM

## 2023-06-07 RX ORDER — LEVOTHYROXINE SODIUM 112 UG/1
112 TABLET ORAL
Qty: 90 TABLET | Refills: 2 | Status: SHIPPED | OUTPATIENT
Start: 2023-06-07 | End: 2023-08-01 | Stop reason: SDUPTHER

## 2023-06-09 ENCOUNTER — TELEPHONE (OUTPATIENT)
Dept: PRIMARY CARE CLINIC | Facility: CLINIC | Age: 58
End: 2023-06-09
Payer: COMMERCIAL

## 2023-06-09 NOTE — TELEPHONE ENCOUNTER
----- Message from Eden Hernandez sent at 6/9/2023  1:43 PM CDT -----  Contact: self  Type:  Patient Returning Call    Who Called:Adri Thomason  Who Left Message for Patient:vita  Does the patient know what this is regarding?:appt  Would the patient rather a call back or a response via [facility]? Call back  Best Call Back Number:[phone]  Additional Information: n/a

## 2023-06-12 ENCOUNTER — TELEPHONE (OUTPATIENT)
Dept: UROLOGY | Facility: CLINIC | Age: 58
End: 2023-06-12
Payer: COMMERCIAL

## 2023-06-12 DIAGNOSIS — E11.9 TYPE 2 DIABETES MELLITUS WITHOUT COMPLICATION, WITHOUT LONG-TERM CURRENT USE OF INSULIN: ICD-10-CM

## 2023-06-12 RX ORDER — TIRZEPATIDE 5 MG/.5ML
INJECTION, SOLUTION SUBCUTANEOUS
Qty: 4 PEN | Refills: 1 | Status: SHIPPED | OUTPATIENT
Start: 2023-06-12 | End: 2023-07-14

## 2023-06-12 NOTE — TELEPHONE ENCOUNTER
Attempted to return pt call, could not leave VM,     ----- Message from Roberta Cheney sent at 6/12/2023  4:07 PM CDT -----  Patient is requesting a call back at [phone]

## 2023-06-22 ENCOUNTER — OFFICE VISIT (OUTPATIENT)
Dept: UROLOGY | Facility: CLINIC | Age: 58
End: 2023-06-22
Payer: COMMERCIAL

## 2023-06-22 VITALS — SYSTOLIC BLOOD PRESSURE: 132 MMHG | TEMPERATURE: 99 F | DIASTOLIC BLOOD PRESSURE: 84 MMHG | HEART RATE: 87 BPM

## 2023-06-22 DIAGNOSIS — R31.0 GROSS HEMATURIA: ICD-10-CM

## 2023-06-22 DIAGNOSIS — R30.0 DYSURIA: Primary | ICD-10-CM

## 2023-06-22 LAB
BILIRUBIN, UA POC OHS: NEGATIVE
BLOOD, UA POC OHS: NEGATIVE
CLARITY, UA POC OHS: CLEAR
COLOR, UA POC OHS: YELLOW
GLUCOSE, UA POC OHS: NEGATIVE
KETONES, UA POC OHS: NEGATIVE
LEUKOCYTES, UA POC OHS: ABNORMAL
NITRITE, UA POC OHS: NEGATIVE
PH, UA POC OHS: 6.5
PROTEIN, UA POC OHS: NEGATIVE
SPECIFIC GRAVITY, UA POC OHS: 1.01
UROBILINOGEN, UA POC OHS: 0.2

## 2023-06-22 PROCEDURE — 3044F PR MOST RECENT HEMOGLOBIN A1C LEVEL <7.0%: ICD-10-PCS | Mod: CPTII,S$GLB,, | Performed by: UROLOGY

## 2023-06-22 PROCEDURE — 81003 POCT URINALYSIS(INSTRUMENT): ICD-10-PCS | Mod: QW,S$GLB,, | Performed by: UROLOGY

## 2023-06-22 PROCEDURE — 3075F PR MOST RECENT SYSTOLIC BLOOD PRESS GE 130-139MM HG: ICD-10-PCS | Mod: CPTII,S$GLB,, | Performed by: UROLOGY

## 2023-06-22 PROCEDURE — 4010F ACE/ARB THERAPY RXD/TAKEN: CPT | Mod: CPTII,S$GLB,, | Performed by: UROLOGY

## 2023-06-22 PROCEDURE — 3079F PR MOST RECENT DIASTOLIC BLOOD PRESSURE 80-89 MM HG: ICD-10-PCS | Mod: CPTII,S$GLB,, | Performed by: UROLOGY

## 2023-06-22 PROCEDURE — 1160F PR REVIEW ALL MEDS BY PRESCRIBER/CLIN PHARMACIST DOCUMENTED: ICD-10-PCS | Mod: CPTII,S$GLB,, | Performed by: UROLOGY

## 2023-06-22 PROCEDURE — 1159F MED LIST DOCD IN RCRD: CPT | Mod: CPTII,S$GLB,, | Performed by: UROLOGY

## 2023-06-22 PROCEDURE — 3044F HG A1C LEVEL LT 7.0%: CPT | Mod: CPTII,S$GLB,, | Performed by: UROLOGY

## 2023-06-22 PROCEDURE — 81003 URINALYSIS AUTO W/O SCOPE: CPT | Mod: QW,S$GLB,, | Performed by: UROLOGY

## 2023-06-22 PROCEDURE — 1160F RVW MEDS BY RX/DR IN RCRD: CPT | Mod: CPTII,S$GLB,, | Performed by: UROLOGY

## 2023-06-22 PROCEDURE — 99204 PR OFFICE/OUTPT VISIT, NEW, LEVL IV, 45-59 MIN: ICD-10-PCS | Mod: S$GLB,,, | Performed by: UROLOGY

## 2023-06-22 PROCEDURE — 3075F SYST BP GE 130 - 139MM HG: CPT | Mod: CPTII,S$GLB,, | Performed by: UROLOGY

## 2023-06-22 PROCEDURE — 4010F PR ACE/ARB THEARPY RXD/TAKEN: ICD-10-PCS | Mod: CPTII,S$GLB,, | Performed by: UROLOGY

## 2023-06-22 PROCEDURE — 3079F DIAST BP 80-89 MM HG: CPT | Mod: CPTII,S$GLB,, | Performed by: UROLOGY

## 2023-06-22 PROCEDURE — 99204 OFFICE O/P NEW MOD 45 MIN: CPT | Mod: S$GLB,,, | Performed by: UROLOGY

## 2023-06-22 PROCEDURE — 1159F PR MEDICATION LIST DOCUMENTED IN MEDICAL RECORD: ICD-10-PCS | Mod: CPTII,S$GLB,, | Performed by: UROLOGY

## 2023-06-22 NOTE — PROGRESS NOTES
Subjective:       Patient ID: Adri Thomason is a 58 y.o. female.    Chief Complaint: Urinary Tract Infection      HPI: 58-year-old female patient presenting to the clinic to establish care for gross hematuria.  Patient states she is had 3 urinary tract infections since January.  Most recently in May of 2023.  She denies a history of urinary tract infections.  She has experienced gross hematuria multiple times in the last 6 months.  Patient is not a smoker and denies a history of smoking.  She denies any symptoms of infection today.       Past Medical History:   Past Medical History:   Diagnosis Date    Allergy     Asthma     HTN (hypertension)        Past Surgical Historical:   Past Surgical History:   Procedure Laterality Date     SECTION      x2        Medications:   Medication List with Changes/Refills   Current Medications    ALBUTEROL (VENTOLIN HFA) 90 MCG/ACTUATION INHALER    INHALE 2 PUFFS INTO THE LUNGS EVERY 4 (FOUR) HOURS AS NEEDED. RESCUE    ALBUTEROL-IPRATROPIUM (DUO-NEB) 2.5 MG-0.5 MG/3 ML NEBULIZER SOLUTION    INHALE 1 VIAL VIA NEBULIZER 4 TIMES A DAY AS NEEDED    ATORVASTATIN (LIPITOR) 20 MG TABLET    TAKE 1 TABLET BY MOUTH EVERY DAY    BREO ELLIPTA 200-25 MCG/DOSE DSDV DISKUS INHALER    INHALE 1 PUFF INTO THE LUNGS ONCE DAILY    CALCIUM CARBONATE-VIT D3- MG CALCIUM- 400 UNIT TAB    Take 1 tablet by mouth once daily.    CLOBETASOL 0.05% (TEMOVATE) 0.05 % OINT    Apply topically 2 (two) times daily.    DUPIXENT  MG/2 ML PNIJ        EPINEPHRINE (EPIPEN) 0.3 MG/0.3 ML ATIN    USE 1 INJECTION AS DIRECTED AS NEEDED FOR EMERGENCY USE ONLY    ESTRADIOL (ESTRACE) 0.01 % (0.1 MG/GRAM) VAGINAL CREAM    Place 1 g vaginally twice a week.    FLUTICASONE PROPIONATE (FLONASE) 50 MCG/ACTUATION NASAL SPRAY    2 sprays by Each Nare route every evening.    INCRUSE ELLIPTA 62.5 MCG/ACTUATION INHALATION CAPSULE    USE 1 INHALATION INTO LUNGS ONCE DAILY AS DIRECTED    LEVOTHYROXINE (SYNTHROID) 112 MCG  TABLET    Take 1 tablet (112 mcg total) by mouth before breakfast.    LEVOTHYROXINE (SYNTHROID) 112 MCG TABLET    Take 1 tablet (112 mcg total) by mouth before breakfast.    LOSARTAN (COZAAR) 50 MG TABLET    TAKE 1 TABLET BY MOUTH EVERY DAY    MONTELUKAST (SINGULAIR) 10 MG TABLET    TAKE 1 TABLET BY MOUTH EVERY DAY IN THE EVENING    TAMSULOSIN (FLOMAX) 0.4 MG CAP    Take 1 capsule (0.4 mg total) by mouth once daily.    TIRZEPATIDE (MOUNJARO) 5 MG/0.5 ML PNIJ    INJECT 5 MG SUBCUTANEOUSLY EVERY 7 DAYS        Past Social History:   Social History     Socioeconomic History    Marital status:      Spouse name: Guicho Thomason    Number of children: 2   Occupational History    Occupation: Business Owner   Tobacco Use    Smoking status: Never    Smokeless tobacco: Never   Substance and Sexual Activity    Alcohol use: Never    Drug use: Never    Sexual activity: Yes     Partners: Male     Birth control/protection: None       Allergies:   Review of patient's allergies indicates:   Allergen Reactions    Codeine Hives    Nsaids (non-steroidal anti-inflammatory drug) Anaphylaxis, Hives and Shortness Of Breath    Sulfa (sulfonamide antibiotics) Rash    Morphine         Family History:   Family History   Problem Relation Age of Onset    Diabetes Mother     Diabetes Father     Asthma Father     Asthma Sister     Asthma Paternal Aunt     Asthma Paternal Grandmother     Asthma Paternal Aunt     Asthma Paternal Aunt     Asthma Paternal Aunt     Asthma Paternal Aunt     Asthma Paternal Aunt         Review of Systems:  Review of Systems   Constitutional:  Negative for activity change, appetite change, chills, diaphoresis, fatigue, fever and unexpected weight change.   HENT:  Negative for congestion, dental problem, drooling, ear discharge, ear pain, facial swelling, hearing loss, mouth sores, nosebleeds, postnasal drip, rhinorrhea, sinus pressure, sinus pain, sneezing, sore throat, tinnitus, trouble swallowing and voice change.     Eyes:  Negative for photophobia, pain, discharge, redness, itching and visual disturbance.   Respiratory:  Negative for apnea, cough, choking, chest tightness, shortness of breath, wheezing and stridor.    Cardiovascular:  Negative for chest pain and leg swelling.   Gastrointestinal:  Negative for abdominal distention, abdominal pain, anal bleeding, blood in stool, constipation, diarrhea, nausea, rectal pain and vomiting.   Endocrine: Negative for cold intolerance, heat intolerance, polydipsia, polyphagia and polyuria.   Genitourinary: Negative.  Negative for decreased urine volume, difficulty urinating, dyspareunia, dysuria, enuresis, flank pain, frequency, genital sores, hematuria, menstrual problem, pelvic pain, urgency, vaginal bleeding, vaginal discharge and vaginal pain.   Musculoskeletal:  Negative for arthralgias, back pain, gait problem, joint swelling, myalgias, neck pain and neck stiffness.   Skin:  Negative for color change, pallor, rash and wound.   Allergic/Immunologic: Negative for environmental allergies, food allergies and immunocompromised state.   Neurological:  Negative for dizziness, tremors, seizures, syncope, facial asymmetry, speech difficulty, weakness, light-headedness, numbness and headaches.   Hematological:  Negative for adenopathy. Does not bruise/bleed easily.   Psychiatric/Behavioral:  Negative for agitation, behavioral problems, confusion, decreased concentration, dysphoric mood, hallucinations, self-injury, sleep disturbance and suicidal ideas. The patient is not nervous/anxious and is not hyperactive.      Physical Exam:  Physical Exam  Exam conducted with a chaperone present.   Constitutional:       Appearance: Normal appearance.   HENT:      Head: Normocephalic.      Nose: Nose normal.      Mouth/Throat:      Mouth: Mucous membranes are moist.      Pharynx: Oropharynx is clear.   Eyes:      Extraocular Movements: Extraocular movements intact.      Conjunctiva/sclera:  Conjunctivae normal.      Pupils: Pupils are equal, round, and reactive to light.   Cardiovascular:      Rate and Rhythm: Normal rate and regular rhythm.      Pulses: Normal pulses.      Heart sounds: Normal heart sounds.   Pulmonary:      Effort: Pulmonary effort is normal.      Breath sounds: Normal breath sounds.   Abdominal:      General: Abdomen is flat. Bowel sounds are normal.      Palpations: Abdomen is soft.      Hernia: There is no hernia in the left inguinal area or right inguinal area.   Genitourinary:     General: Normal vulva.      Pubic Area: No rash or pubic lice.       Labia:         Right: No rash, tenderness, lesion or injury.         Left: No rash, tenderness, lesion or injury.       Urethra: No prolapse, urethral pain, urethral swelling or urethral lesion.      Rectum: Normal.   Musculoskeletal:         General: Normal range of motion.      Cervical back: Normal range of motion and neck supple.   Lymphadenopathy:      Lower Body: No right inguinal adenopathy. No left inguinal adenopathy.   Skin:     General: Skin is warm and dry.      Capillary Refill: Capillary refill takes less than 2 seconds.   Neurological:      General: No focal deficit present.      Mental Status: She is alert and oriented to person, place, and time. Mental status is at baseline.   Psychiatric:         Mood and Affect: Mood normal.         Behavior: Behavior normal.         Thought Content: Thought content normal.         Judgment: Judgment normal.       Assessment/Plan:       Gross hematuria:  Urinalysis today is negative for infection or blood.  Patient is not a smoker and denies a history of smoking.  She has been experiencing multiple urinary tract infections since January.  We will complete hematuria workup including CT urogram and cystoscopy.  Bladder scan today was 10 mL post void.    I, Dr. Jose Luis Latham have seen and personally evaluated the patient. I have formulated the plan reviewed all pertinent imaging and  clinical data.  I agree with the nurse practitioner's assessment, and I have personally formulated the plan for this patient's care as described by the midlevel.      Problem List Items Addressed This Visit    None  Visit Diagnoses       Gross hematuria    -  Primary    Relevant Orders    CT Urogram Abd Pelvis W WO    Cystoscopy

## 2023-07-10 ENCOUNTER — CLINICAL SUPPORT (OUTPATIENT)
Dept: PULMONOLOGY | Facility: CLINIC | Age: 58
End: 2023-07-10
Payer: COMMERCIAL

## 2023-07-10 ENCOUNTER — OFFICE VISIT (OUTPATIENT)
Dept: PULMONOLOGY | Facility: CLINIC | Age: 58
End: 2023-07-10
Payer: COMMERCIAL

## 2023-07-10 VITALS
HEIGHT: 60 IN | HEART RATE: 84 BPM | SYSTOLIC BLOOD PRESSURE: 121 MMHG | WEIGHT: 165 LBS | RESPIRATION RATE: 16 BRPM | DIASTOLIC BLOOD PRESSURE: 84 MMHG | BODY MASS INDEX: 32.39 KG/M2 | OXYGEN SATURATION: 97 %

## 2023-07-10 DIAGNOSIS — G47.33 OSA ON CPAP: ICD-10-CM

## 2023-07-10 DIAGNOSIS — J45.909 SEVERE ASTHMA WITHOUT COMPLICATION, UNSPECIFIED WHETHER PERSISTENT: ICD-10-CM

## 2023-07-10 DIAGNOSIS — E66.9 OBESITY, UNSPECIFIED CLASSIFICATION, UNSPECIFIED OBESITY TYPE, UNSPECIFIED WHETHER SERIOUS COMORBIDITY PRESENT: ICD-10-CM

## 2023-07-10 DIAGNOSIS — J45.909 ASTHMA, UNSPECIFIED ASTHMA SEVERITY, UNSPECIFIED WHETHER COMPLICATED, UNSPECIFIED WHETHER PERSISTENT: Primary | ICD-10-CM

## 2023-07-10 PROCEDURE — 1159F PR MEDICATION LIST DOCUMENTED IN MEDICAL RECORD: ICD-10-PCS | Mod: CPTII,S$GLB,, | Performed by: INTERNAL MEDICINE

## 2023-07-10 PROCEDURE — 3074F PR MOST RECENT SYSTOLIC BLOOD PRESSURE < 130 MM HG: ICD-10-PCS | Mod: CPTII,S$GLB,, | Performed by: INTERNAL MEDICINE

## 2023-07-10 PROCEDURE — 3079F PR MOST RECENT DIASTOLIC BLOOD PRESSURE 80-89 MM HG: ICD-10-PCS | Mod: CPTII,S$GLB,, | Performed by: INTERNAL MEDICINE

## 2023-07-10 PROCEDURE — 3008F BODY MASS INDEX DOCD: CPT | Mod: CPTII,S$GLB,, | Performed by: INTERNAL MEDICINE

## 2023-07-10 PROCEDURE — 3074F SYST BP LT 130 MM HG: CPT | Mod: CPTII,S$GLB,, | Performed by: INTERNAL MEDICINE

## 2023-07-10 PROCEDURE — 1160F RVW MEDS BY RX/DR IN RCRD: CPT | Mod: CPTII,S$GLB,, | Performed by: INTERNAL MEDICINE

## 2023-07-10 PROCEDURE — 3079F DIAST BP 80-89 MM HG: CPT | Mod: CPTII,S$GLB,, | Performed by: INTERNAL MEDICINE

## 2023-07-10 PROCEDURE — 4010F PR ACE/ARB THEARPY RXD/TAKEN: ICD-10-PCS | Mod: CPTII,S$GLB,, | Performed by: INTERNAL MEDICINE

## 2023-07-10 PROCEDURE — 94060 PR EVAL OF BRONCHOSPASM: ICD-10-PCS | Mod: S$GLB,,, | Performed by: INTERNAL MEDICINE

## 2023-07-10 PROCEDURE — 94060 EVALUATION OF WHEEZING: CPT | Mod: S$GLB,,, | Performed by: INTERNAL MEDICINE

## 2023-07-10 PROCEDURE — 99214 OFFICE O/P EST MOD 30 MIN: CPT | Mod: 25,S$GLB,, | Performed by: INTERNAL MEDICINE

## 2023-07-10 PROCEDURE — 99214 PR OFFICE/OUTPT VISIT, EST, LEVL IV, 30-39 MIN: ICD-10-PCS | Mod: 25,S$GLB,, | Performed by: INTERNAL MEDICINE

## 2023-07-10 PROCEDURE — 3044F PR MOST RECENT HEMOGLOBIN A1C LEVEL <7.0%: ICD-10-PCS | Mod: CPTII,S$GLB,, | Performed by: INTERNAL MEDICINE

## 2023-07-10 PROCEDURE — 1160F PR REVIEW ALL MEDS BY PRESCRIBER/CLIN PHARMACIST DOCUMENTED: ICD-10-PCS | Mod: CPTII,S$GLB,, | Performed by: INTERNAL MEDICINE

## 2023-07-10 PROCEDURE — 4010F ACE/ARB THERAPY RXD/TAKEN: CPT | Mod: CPTII,S$GLB,, | Performed by: INTERNAL MEDICINE

## 2023-07-10 PROCEDURE — 1159F MED LIST DOCD IN RCRD: CPT | Mod: CPTII,S$GLB,, | Performed by: INTERNAL MEDICINE

## 2023-07-10 PROCEDURE — 3008F PR BODY MASS INDEX (BMI) DOCUMENTED: ICD-10-PCS | Mod: CPTII,S$GLB,, | Performed by: INTERNAL MEDICINE

## 2023-07-10 PROCEDURE — 3044F HG A1C LEVEL LT 7.0%: CPT | Mod: CPTII,S$GLB,, | Performed by: INTERNAL MEDICINE

## 2023-07-10 RX ORDER — ERGOCALCIFEROL 1.25 MG/1
50000 CAPSULE ORAL
COMMUNITY
End: 2023-12-05 | Stop reason: SDUPTHER

## 2023-07-10 NOTE — PROGRESS NOTES
Subjective:    Patient Identification:   Patient ID: Adri Thomason is a 58 y.o. female.    Referring Provider:  Established patient    Chief Complaint:  Sleep Apnea  Asthma    History of Present Illness:    Adri Thomason is a 58 y.o. female who presents for the follow-up of above-mentioned chronic conditions.  Her asthma control is excellent with Dupixent, Incruse and Breo. She did not have to use her rescue inhaler for a long time now.  During ragweed season she had to use her nebulizer couple of days.  No systemic steroids since started on Dupixent in September 2022.  She is compliant with CPAP and is getting significant benefit from using it.  [place] Sleepiness scale score is 3.  She is having excellent sleep.  She tosses and turns.  She is a light sleeper.  He has not slept that well for long time as she has done since introduction of CPAP.  Her enuresis has significantly improved as well.  Reviewing on data which is about a month old her usage is 87%.  More than 4 hour usage is 50%.  AHI is controlled and 95th percentile pressure is 10.1 with an acceptable leak of 15.7.  She has been able to lose some weight as well.  She mentions that she has not felt as good throughout her life as she has felt after introduction of Dupixent and CPAP.  She did a spirometry today which is normal.  She mentions that she has not done spirometry with such an ease ever before.    Review of Systems:  Review of Systems   Constitutional:  Negative for fever, chills, weight loss, weight gain, activity change, appetite change, fatigue, night sweats and weakness.   HENT:  Negative for nosebleeds, postnasal drip, rhinorrhea, sinus pressure, sore throat, trouble swallowing, voice change, congestion, ear pain and hearing loss.    Eyes:  Negative for redness and itching.   Respiratory:  Negative for cough, hemoptysis, sputum production, choking, chest tightness, shortness of breath, wheezing, orthopnea, previous hospitialization due to  pulmonary problems, asthma nighttime symptoms, pleurisy, dyspnea on extertion, use of rescue inhaler and Paroxysmal Nocturnal Dyspnea.    Cardiovascular:  Negative for chest pain, palpitations and leg swelling.   Genitourinary:  Negative for difficulty urinating and hematuria.   Endocrine:  Negative for polydipsia, polyphagia, cold intolerance, heat intolerance and polyuria.    Musculoskeletal:  Negative for arthralgias, back pain, gait problem, joint swelling and myalgias.   Skin:  Negative for rash.   Gastrointestinal:  Negative for nausea, vomiting, abdominal pain, abdominal distention and acid reflux.   Neurological:  Negative for dizziness, syncope, weakness, light-headedness and headaches.   Hematological:  Negative for adenopathy. Does not bruise/bleed easily and no excessive bruising.   Psychiatric/Behavioral:  Negative for confusion and sleep disturbance. The patient is not nervous/anxious.        Allergies:   Review of patient's allergies indicates:   Allergen Reactions    Codeine Hives    Nsaids (non-steroidal anti-inflammatory drug) Anaphylaxis, Hives and Shortness Of Breath    Sulfa (sulfonamide antibiotics) Rash    Morphine        Medications:      Past Medical History:      Past Medical History:   Diagnosis Date    Allergy     Asthma     HTN (hypertension)        Family History:      Family History   Problem Relation Age of Onset    Diabetes Mother     Diabetes Father     Asthma Father     Asthma Sister     Asthma Paternal Aunt     Asthma Paternal Grandmother     Asthma Paternal Aunt     Asthma Paternal Aunt     Asthma Paternal Aunt     Asthma Paternal Aunt     Asthma Paternal Aunt         Social History:      Past Surgical History:   Procedure Laterality Date     SECTION      x2       Physical Exam:  Vitals:    07/10/23 0916   BP: 121/84   Pulse: 84   Resp: 16     Physical Exam   Constitutional: She is oriented to person, place, and time. She appears not cachectic. No distress. She is obese.    HENT:   Head: Normocephalic.   Right Ear: External ear normal.   Left Ear: External ear normal.   Nose: Nose normal. No mucosal edema. No polyps.   Mouth/Throat: Oropharynx is clear and moist. Normal dentition. No oropharyngeal exudate.   Neck: No JVD present. No tracheal deviation present. No thyromegaly present.   Cardiovascular: Normal rate, regular rhythm, normal heart sounds and intact distal pulses. Exam reveals no gallop and no friction rub.   No murmur heard.  Pulmonary/Chest: Normal expansion, symmetric chest wall expansion, effort normal and breath sounds normal. No stridor. No respiratory distress. She has no decreased breath sounds. She has no wheezes. She has no rhonchi. She has no rales. Chest wall is not dull to percussion. She exhibits no tenderness. Negative for egophony. Negative for tactile fremitus.   Abdominal: Soft. Bowel sounds are normal. She exhibits no distension and no mass. There is no hepatosplenomegaly. There is no abdominal tenderness. There is no rebound and no guarding. No hernia.   Musculoskeletal:         General: No tenderness, deformity or edema. Normal range of motion.      Cervical back: Normal range of motion and neck supple.   Lymphadenopathy: No supraclavicular adenopathy is present.     She has no cervical adenopathy.     She has no axillary adenopathy.   Neurological: She is alert and oriented to person, place, and time. She has normal reflexes. She displays normal reflexes. No cranial nerve deficit. She exhibits normal muscle tone.   Skin: Skin is warm and dry. No rash noted. She is not diaphoretic. No cyanosis or erythema. No pallor. Nails show no clubbing.   Psychiatric: She has a normal mood and affect. Her behavior is normal. Judgment and thought content normal.          Accessory Clinical Data:  Chest x-ray:    CT scan:     PFTs:     6MWT:     TTE:    Lab data:    All radiographic imaging of the chest, PFT tracings/data, and 6MWT data have been independently  reviewed and interpreted unless otherwise specified.     Assessment and Plan:        Problem List Items Addressed This Visit          Pulmonary    Asthma - Primary    Relevant Orders    CBC Auto Differential       Endocrine    Obesity     Other Visit Diagnoses       [place] on CPAP                  Continue with current therapy including Dupixent, Incruse and Breo.    Keep up-to-date with preventative seasonal and age-appropriate vaccinations.    Continue to use CPAP as you are using.     Continue to avoid triggers as you are doing.  Check for peripheral eosinophilia in 4 months (Dupixent can worsen peripheral eosinophilia).      Follow up in about 4 months (around 11/10/2023).     This note is dictated on [employer] word recognition program.  There are word recognition mistakes that are occasionally missed on review.

## 2023-07-14 DIAGNOSIS — E11.9 TYPE 2 DIABETES MELLITUS WITHOUT COMPLICATION, WITHOUT LONG-TERM CURRENT USE OF INSULIN: ICD-10-CM

## 2023-07-14 RX ORDER — TIRZEPATIDE 5 MG/.5ML
INJECTION, SOLUTION SUBCUTANEOUS
Qty: 4 PEN | Refills: 1 | Status: SHIPPED | OUTPATIENT
Start: 2023-07-14 | End: 2023-09-25

## 2023-07-27 DIAGNOSIS — E78.5 HYPERLIPIDEMIA, UNSPECIFIED HYPERLIPIDEMIA TYPE: ICD-10-CM

## 2023-07-28 RX ORDER — ATORVASTATIN CALCIUM 20 MG/1
TABLET, FILM COATED ORAL
Qty: 90 TABLET | Refills: 3 | Status: SHIPPED | OUTPATIENT
Start: 2023-07-28

## 2023-08-01 ENCOUNTER — OFFICE VISIT (OUTPATIENT)
Dept: PRIMARY CARE CLINIC | Facility: CLINIC | Age: 58
End: 2023-08-01
Payer: COMMERCIAL

## 2023-08-01 VITALS
OXYGEN SATURATION: 97 % | BODY MASS INDEX: 34.36 KG/M2 | DIASTOLIC BLOOD PRESSURE: 74 MMHG | TEMPERATURE: 97 F | RESPIRATION RATE: 16 BRPM | HEART RATE: 77 BPM | WEIGHT: 175 LBS | SYSTOLIC BLOOD PRESSURE: 126 MMHG | HEIGHT: 60 IN

## 2023-08-01 DIAGNOSIS — E11.9 TYPE 2 DIABETES MELLITUS WITHOUT COMPLICATION, WITHOUT LONG-TERM CURRENT USE OF INSULIN: Primary | ICD-10-CM

## 2023-08-01 DIAGNOSIS — J45.909 ASTHMA, UNSPECIFIED ASTHMA SEVERITY, UNSPECIFIED WHETHER COMPLICATED, UNSPECIFIED WHETHER PERSISTENT: ICD-10-CM

## 2023-08-01 DIAGNOSIS — I10 BENIGN ESSENTIAL HTN: ICD-10-CM

## 2023-08-01 PROCEDURE — 3044F HG A1C LEVEL LT 7.0%: CPT | Mod: CPTII,S$GLB,, | Performed by: INTERNAL MEDICINE

## 2023-08-01 PROCEDURE — 3044F PR MOST RECENT HEMOGLOBIN A1C LEVEL <7.0%: ICD-10-PCS | Mod: CPTII,S$GLB,, | Performed by: INTERNAL MEDICINE

## 2023-08-01 PROCEDURE — 99214 PR OFFICE/OUTPT VISIT, EST, LEVL IV, 30-39 MIN: ICD-10-PCS | Mod: S$GLB,,, | Performed by: INTERNAL MEDICINE

## 2023-08-01 PROCEDURE — 4010F PR ACE/ARB THEARPY RXD/TAKEN: ICD-10-PCS | Mod: CPTII,S$GLB,, | Performed by: INTERNAL MEDICINE

## 2023-08-01 PROCEDURE — 3078F PR MOST RECENT DIASTOLIC BLOOD PRESSURE < 80 MM HG: ICD-10-PCS | Mod: CPTII,S$GLB,, | Performed by: INTERNAL MEDICINE

## 2023-08-01 PROCEDURE — 3078F DIAST BP <80 MM HG: CPT | Mod: CPTII,S$GLB,, | Performed by: INTERNAL MEDICINE

## 2023-08-01 PROCEDURE — 1159F PR MEDICATION LIST DOCUMENTED IN MEDICAL RECORD: ICD-10-PCS | Mod: CPTII,S$GLB,, | Performed by: INTERNAL MEDICINE

## 2023-08-01 PROCEDURE — 1160F RVW MEDS BY RX/DR IN RCRD: CPT | Mod: CPTII,S$GLB,, | Performed by: INTERNAL MEDICINE

## 2023-08-01 PROCEDURE — 3008F PR BODY MASS INDEX (BMI) DOCUMENTED: ICD-10-PCS | Mod: CPTII,S$GLB,, | Performed by: INTERNAL MEDICINE

## 2023-08-01 PROCEDURE — 3008F BODY MASS INDEX DOCD: CPT | Mod: CPTII,S$GLB,, | Performed by: INTERNAL MEDICINE

## 2023-08-01 PROCEDURE — 1160F PR REVIEW ALL MEDS BY PRESCRIBER/CLIN PHARMACIST DOCUMENTED: ICD-10-PCS | Mod: CPTII,S$GLB,, | Performed by: INTERNAL MEDICINE

## 2023-08-01 PROCEDURE — 4010F ACE/ARB THERAPY RXD/TAKEN: CPT | Mod: CPTII,S$GLB,, | Performed by: INTERNAL MEDICINE

## 2023-08-01 PROCEDURE — 3074F SYST BP LT 130 MM HG: CPT | Mod: CPTII,S$GLB,, | Performed by: INTERNAL MEDICINE

## 2023-08-01 PROCEDURE — 99214 OFFICE O/P EST MOD 30 MIN: CPT | Mod: S$GLB,,, | Performed by: INTERNAL MEDICINE

## 2023-08-01 PROCEDURE — 1159F MED LIST DOCD IN RCRD: CPT | Mod: CPTII,S$GLB,, | Performed by: INTERNAL MEDICINE

## 2023-08-01 PROCEDURE — 3074F PR MOST RECENT SYSTOLIC BLOOD PRESSURE < 130 MM HG: ICD-10-PCS | Mod: CPTII,S$GLB,, | Performed by: INTERNAL MEDICINE

## 2023-08-01 RX ORDER — EPINEPHRINE 0.3 MG/.3ML
INJECTION SUBCUTANEOUS
Qty: 2 EACH | Refills: 1 | Status: SHIPPED | OUTPATIENT
Start: 2023-08-01

## 2023-08-01 NOTE — PROGRESS NOTES
Subjective:      Patient ID: Adri Thomason is a 58 y.o. female.    Chief Complaint: Follow-up (3month f/u discuss blood pressures around 135/82)    HPI:  Diabetes with last A1c of 5.7 is at goal.  Patient is on Mounjaro and that seems to be helping a lot.  Patient appetite is suppressed and has lost more than 20 lb.  Patient denies polyuria polydipsia, numbness in hands or feet.  Patient has hypertension and blood pressure seem under good control with losartan.  Patient reports a few episode where blood pressure went high 130s over 80s.       Patient has asthma and that seem under good control After patient is started on Dupixent by pulmonologist/Dr. Arellano.  Patient also has history of anaphylactic reaction and has EpiPen    Review of Systems   Constitutional:  Positive for weight loss (2 lb weight loss). Negative for chills, diaphoresis, fever and malaise/fatigue.   HENT:  Negative for congestion, ear pain, sinus pain, sore throat and tinnitus.    Eyes:  Negative for blurred vision and photophobia.   Respiratory:  Negative for cough, hemoptysis, shortness of breath and wheezing.    Cardiovascular:  Negative for chest pain, palpitations, orthopnea, leg swelling and PND.   Gastrointestinal:  Negative for abdominal pain, blood in stool, constipation, diarrhea, heartburn, melena, nausea and vomiting.   Genitourinary:  Negative for dysuria, frequency and urgency.   Musculoskeletal:  Negative for back pain, myalgias and neck pain.   Skin:  Negative for rash.   Neurological:  Negative for dizziness, tremors, seizures, loss of consciousness and weakness.   Endo/Heme/Allergies:  Negative for polydipsia.   Psychiatric/Behavioral:  Negative for depression and hallucinations. The patient does not have insomnia.      Objective:   /74 (BP Location: Left arm, Patient Position: Sitting, BP Method: Medium (Manual))   Pulse 77   Temp 97 °F (36.1 °C) (Temporal)   Resp 16   Ht 5' (1.524 m)   Wt 79.4 kg (175 lb)   SpO2  97%   BMI 34.18 kg/m²     Physical Exam:  Patient not examined  Assessment:       ICD-10-CM ICD-9-CM   1. Type 2 diabetes mellitus without complication, without long-term current use of insulin  E11.9 250.00   2. Asthma, unspecified asthma severity, unspecified whether complicated, unspecified whether persistent  J45.909 493.90   3. Benign essential HTN  I10 401.1       Plan:     Patient has diabetes with last A1c of 5.7 is at goal  Patient is more adherent to diet and is losing weight with Mounjaro  Patient is encouraged to continue  Will check A1c and CMP  Patient has asthma and symptoms are under good control.  Will continue medication  Patient blood pressure in office seem under better control  Patient reports some high blood pressure reading at home.  Advised patient to monitor blood pressures at home and bring log  Be checked only if patient is sitting down comfortable relax for more than 5 minutes  Medication List with Changes/Refills   Current Medications    ALBUTEROL (VENTOLIN HFA) 90 MCG/ACTUATION INHALER    INHALE 2 PUFFS INTO THE LUNGS EVERY 4 (FOUR) HOURS AS NEEDED. RESCUE    ALBUTEROL-IPRATROPIUM (DUO-NEB) 2.5 MG-0.5 MG/3 ML NEBULIZER SOLUTION    INHALE 1 VIAL VIA NEBULIZER 4 TIMES A DAY AS NEEDED    ATORVASTATIN (LIPITOR) 20 MG TABLET    TAKE 1 TABLET BY MOUTH EVERY DAY    BREO ELLIPTA 200-25 MCG/DOSE DSDV DISKUS INHALER    INHALE 1 PUFF INTO THE LUNGS ONCE DAILY    CALCIUM CARBONATE-VIT D3- MG CALCIUM- 400 UNIT TAB    Take 1 tablet by mouth once daily.    CLOBETASOL 0.05% (TEMOVATE) 0.05 % OINT    Apply topically 2 (two) times daily.    DUPIXENT  MG/2 ML PNIJ        ERGOCALCIFEROL (VITAMIN D2) 50,000 UNIT CAP    Take 50,000 Units by mouth every 7 days.    ESTRADIOL (ESTRACE) 0.01 % (0.1 MG/GRAM) VAGINAL CREAM    Place 1 g vaginally twice a week.    FLUTICASONE PROPIONATE (FLONASE) 50 MCG/ACTUATION NASAL SPRAY    2 sprays by Each Nare route every evening.    INCRUSE ELLIPTA 62.5  MCG/ACTUATION INHALATION CAPSULE    USE 1 INHALATION INTO LUNGS ONCE DAILY AS DIRECTED    LEVOTHYROXINE (SYNTHROID) 112 MCG TABLET    Take 1 tablet (112 mcg total) by mouth before breakfast.    LOSARTAN (COZAAR) 50 MG TABLET    TAKE 1 TABLET BY MOUTH EVERY DAY    MONTELUKAST (SINGULAIR) 10 MG TABLET    TAKE 1 TABLET BY MOUTH EVERY DAY IN THE EVENING    TIRZEPATIDE (MOUNJARO) 5 MG/0.5 ML PNIJ    INJECT 5 MG SUBCUTANEOUSLY EVERY 7 DAYS   Changed and/or Refilled Medications    Modified Medication Previous Medication    EPINEPHRINE (EPIPEN) 0.3 MG/0.3 ML ATIN EPINEPHrine (EPIPEN) 0.3 mg/0.3 mL AtIn       USE 1 INJECTION AS DIRECTED AS NEEDED FOR EMERGENCY USE ONLY    USE 1 INJECTION AS DIRECTED AS NEEDED FOR EMERGENCY USE ONLY   Discontinued Medications    LEVOTHYROXINE (SYNTHROID) 112 MCG TABLET    Take 1 tablet (112 mcg total) by mouth before breakfast.    TAMSULOSIN (FLOMAX) 0.4 MG CAP    Take 1 capsule (0.4 mg total) by mouth once daily.

## 2023-08-07 ENCOUNTER — TELEPHONE (OUTPATIENT)
Dept: UROLOGY | Facility: CLINIC | Age: 58
End: 2023-08-07
Payer: COMMERCIAL

## 2023-08-07 ENCOUNTER — CLINICAL SUPPORT (OUTPATIENT)
Dept: OBSTETRICS AND GYNECOLOGY | Facility: CLINIC | Age: 58
End: 2023-08-07
Payer: COMMERCIAL

## 2023-08-07 DIAGNOSIS — Z01.89 ROUTINE LAB DRAW: Primary | ICD-10-CM

## 2023-08-07 DIAGNOSIS — J45.50 SEVERE PERSISTENT ASTHMA WITH ALLERGIC RHINITIS, UNSPECIFIED WHETHER COMPLICATED: Primary | ICD-10-CM

## 2023-08-07 LAB
ALBUMIN SERPL BCP-MCNC: 3.5 G/DL (ref 3.4–5)
ALP SERPL-CCNC: 70 U/L (ref 45–117)
ALT SERPL W P-5'-P-CCNC: 25 U/L (ref 13–56)
ANION GAP SERPL CALC-SCNC: 8 MMOL/L (ref 3–11)
AST SERPL-CCNC: 14 U/L (ref 15–37)
BILIRUB SERPL-MCNC: 0.3 MG/DL (ref 0.2–1)
BUN SERPL-MCNC: 17 MG/DL (ref 7–18)
BUN/CREAT SERPL: 25.75 RATIO
CALCIUM SERPL-MCNC: 8.9 MG/DL (ref 8.5–10.1)
CHLORIDE SERPL-SCNC: 107 MMOL/L (ref 98–107)
CO2 SERPL-SCNC: 26 MMOL/L (ref 21–32)
CREAT SERPL-MCNC: 0.66 MG/DL (ref 0.55–1.02)
ESTIMATED AVG GLUCOSE: 115 MG/DL
GFR ESTIMATION: > 60
GLUCOSE SERPL-MCNC: 98 MG/DL (ref 74–106)
HBA1C MFR BLD: 5.4 % (ref 4.2–6.3)
POTASSIUM SERPL-SCNC: 4 MMOL/L (ref 3.5–5.1)
PROT SERPL-MCNC: 6.8 G/DL (ref 6.4–8.2)
SODIUM BLD-SCNC: 141 MMOL/L (ref 131–143)

## 2023-08-07 PROCEDURE — 36415 PR COLLECTION VENOUS BLOOD,VENIPUNCTURE: ICD-10-PCS | Mod: S$GLB,,, | Performed by: INTERNAL MEDICINE

## 2023-08-07 PROCEDURE — 36415 COLL VENOUS BLD VENIPUNCTURE: CPT | Mod: S$GLB,,, | Performed by: INTERNAL MEDICINE

## 2023-08-08 ENCOUNTER — PROCEDURE VISIT (OUTPATIENT)
Dept: UROLOGY | Facility: CLINIC | Age: 58
End: 2023-08-08
Payer: COMMERCIAL

## 2023-08-08 VITALS
BODY MASS INDEX: 34.36 KG/M2 | WEIGHT: 175 LBS | SYSTOLIC BLOOD PRESSURE: 122 MMHG | HEART RATE: 65 BPM | HEIGHT: 60 IN | DIASTOLIC BLOOD PRESSURE: 65 MMHG

## 2023-08-08 DIAGNOSIS — R31.0 GROSS HEMATURIA: ICD-10-CM

## 2023-08-08 PROCEDURE — 52000 CYSTOURETHROSCOPY: CPT | Mod: S$GLB,,, | Performed by: UROLOGY

## 2023-08-08 PROCEDURE — 52000 CYSTOSCOPY: ICD-10-PCS | Mod: S$GLB,,, | Performed by: UROLOGY

## 2023-08-08 NOTE — PROCEDURES
Cystoscopy    Date/Time: 8/8/2023 9:30 AM    Performed by: Jose Luis Latham MD  Authorized by: Page Valerio NP    Consent Done?:  Yes (Written)  Timeout: prior to procedure the correct patient, procedure, and site was verified    Prep: patient was prepped and draped in usual sterile fashion    Anesthesia:  Intraurethral instillation  Indications: hematuria    Position:  Supine  Anesthesia:  Intraurethral instillation  Patient sedated?: No    Preparation: Patient was prepped and draped in usual sterile fashion    Scope type:  Flexible cystoscope  External exam normal: Yes    Urethra normal: Yes    Comments:      The patient was brought to the procedure room placed on the table padded prepped and draped in usual sterile fashion in supine position. The cystoscope was inserted into the urethra and advanced the urethra was normal. The bladder was entered and inspected, it was found to be free of tumor stone or foreign body.  Bilateral ureteral orifices were identified and noted to be normal in appearance with clear efflux of urine at this point the scope was removed the patient tolerated the procedure well there were no complications.  Pelvic exam was performed and no abnormalities were noted.

## 2023-08-10 ENCOUNTER — CLINICAL SUPPORT (OUTPATIENT)
Dept: UROLOGY | Facility: CLINIC | Age: 58
End: 2023-08-10
Payer: COMMERCIAL

## 2023-08-10 DIAGNOSIS — R31.0 GROSS HEMATURIA: Primary | ICD-10-CM

## 2023-08-10 LAB
BILIRUBIN, UA POC OHS: NEGATIVE
BLOOD, UA POC OHS: NEGATIVE
CLARITY, UA POC OHS: CLEAR
COLOR, UA POC OHS: YELLOW
GLUCOSE, UA POC OHS: NEGATIVE
KETONES, UA POC OHS: NEGATIVE
LEUKOCYTES, UA POC OHS: ABNORMAL
NITRITE, UA POC OHS: NEGATIVE
PH, UA POC OHS: 7
PROTEIN, UA POC OHS: NEGATIVE
SPECIFIC GRAVITY, UA POC OHS: 1.01
UROBILINOGEN, UA POC OHS: 0.2

## 2023-08-10 PROCEDURE — 81003 POCT URINALYSIS(INSTRUMENT): ICD-10-PCS | Mod: QW,S$GLB,, | Performed by: UROLOGY

## 2023-08-10 PROCEDURE — 81003 URINALYSIS AUTO W/O SCOPE: CPT | Mod: QW,S$GLB,, | Performed by: UROLOGY

## 2023-08-11 ENCOUNTER — TELEPHONE (OUTPATIENT)
Dept: UROLOGY | Facility: CLINIC | Age: 58
End: 2023-08-11
Payer: COMMERCIAL

## 2023-08-11 LAB — URINE CULTURE, ROUTINE: NORMAL

## 2023-08-11 NOTE — TELEPHONE ENCOUNTER
I called, notified the patient that there is no additional need for antibiotic at this time.  Follow up as needed., pt verbalized  and agreed as well. sj    ----- Message from Page Valerio NP sent at 8/11/2023 11:10 AM CDT -----  Please notify the patient that there is no additional need for antibiotic at this time.  Follow up as needed.

## 2023-08-30 DIAGNOSIS — J45.50 SEVERE PERSISTENT ASTHMA WITH ALLERGIC RHINITIS, UNSPECIFIED WHETHER COMPLICATED: Primary | ICD-10-CM

## 2023-08-30 RX ORDER — UMECLIDINIUM 62.5 UG/1
AEROSOL, POWDER ORAL
Qty: 30 EACH | Refills: 2 | Status: SHIPPED | OUTPATIENT
Start: 2023-08-30 | End: 2024-02-22

## 2023-09-02 DIAGNOSIS — J45.50 SEVERE PERSISTENT ASTHMA, UNSPECIFIED WHETHER COMPLICATED: ICD-10-CM

## 2023-09-05 RX ORDER — FLUTICASONE FUROATE AND VILANTEROL TRIFENATATE 200; 25 UG/1; UG/1
1 POWDER RESPIRATORY (INHALATION)
Qty: 60 EACH | Refills: 5 | Status: SHIPPED | OUTPATIENT
Start: 2023-09-05 | End: 2024-02-26 | Stop reason: ALTCHOICE

## 2023-09-25 DIAGNOSIS — E11.9 TYPE 2 DIABETES MELLITUS WITHOUT COMPLICATION, WITHOUT LONG-TERM CURRENT USE OF INSULIN: ICD-10-CM

## 2023-09-25 RX ORDER — TIRZEPATIDE 5 MG/.5ML
INJECTION, SOLUTION SUBCUTANEOUS
Qty: 4 PEN | Refills: 3 | Status: SHIPPED | OUTPATIENT
Start: 2023-09-25 | End: 2024-02-22

## 2023-10-04 DIAGNOSIS — Z12.31 SCREENING MAMMOGRAM, ENCOUNTER FOR: Primary | ICD-10-CM

## 2023-10-09 RX ORDER — UMECLIDINIUM 62.5 UG/1
AEROSOL, POWDER ORAL
Qty: 30 EACH | Refills: 2 | Status: SHIPPED | OUTPATIENT
Start: 2023-10-09 | End: 2024-02-26 | Stop reason: ALTCHOICE

## 2023-11-01 ENCOUNTER — OFFICE VISIT (OUTPATIENT)
Dept: PRIMARY CARE CLINIC | Facility: CLINIC | Age: 58
End: 2023-11-01
Payer: COMMERCIAL

## 2023-11-01 VITALS
DIASTOLIC BLOOD PRESSURE: 88 MMHG | HEART RATE: 82 BPM | OXYGEN SATURATION: 98 % | HEIGHT: 60 IN | SYSTOLIC BLOOD PRESSURE: 117 MMHG | TEMPERATURE: 98 F | BODY MASS INDEX: 33.93 KG/M2 | WEIGHT: 172.81 LBS | RESPIRATION RATE: 18 BRPM

## 2023-11-01 DIAGNOSIS — I10 BENIGN ESSENTIAL HTN: ICD-10-CM

## 2023-11-01 DIAGNOSIS — E03.9 HYPOTHYROIDISM, UNSPECIFIED TYPE: ICD-10-CM

## 2023-11-01 DIAGNOSIS — E11.9 TYPE 2 DIABETES MELLITUS WITHOUT COMPLICATION, WITHOUT LONG-TERM CURRENT USE OF INSULIN: Primary | ICD-10-CM

## 2023-11-01 LAB — GLUCOSE SERPL-MCNC: 82 MG/DL (ref 70–110)

## 2023-11-01 PROCEDURE — 1160F RVW MEDS BY RX/DR IN RCRD: CPT | Mod: CPTII,S$GLB,, | Performed by: INTERNAL MEDICINE

## 2023-11-01 PROCEDURE — 3044F PR MOST RECENT HEMOGLOBIN A1C LEVEL <7.0%: ICD-10-PCS | Mod: CPTII,S$GLB,, | Performed by: INTERNAL MEDICINE

## 2023-11-01 PROCEDURE — 4010F ACE/ARB THERAPY RXD/TAKEN: CPT | Mod: CPTII,S$GLB,, | Performed by: INTERNAL MEDICINE

## 2023-11-01 PROCEDURE — 99214 PR OFFICE/OUTPT VISIT, EST, LEVL IV, 30-39 MIN: ICD-10-PCS | Mod: S$GLB,,, | Performed by: INTERNAL MEDICINE

## 2023-11-01 PROCEDURE — 3008F BODY MASS INDEX DOCD: CPT | Mod: CPTII,S$GLB,, | Performed by: INTERNAL MEDICINE

## 2023-11-01 PROCEDURE — 1159F PR MEDICATION LIST DOCUMENTED IN MEDICAL RECORD: ICD-10-PCS | Mod: CPTII,S$GLB,, | Performed by: INTERNAL MEDICINE

## 2023-11-01 PROCEDURE — 82962 POCT GLUCOSE, HAND-HELD DEVICE: ICD-10-PCS | Mod: ,,, | Performed by: INTERNAL MEDICINE

## 2023-11-01 PROCEDURE — 3008F PR BODY MASS INDEX (BMI) DOCUMENTED: ICD-10-PCS | Mod: CPTII,S$GLB,, | Performed by: INTERNAL MEDICINE

## 2023-11-01 PROCEDURE — 1160F PR REVIEW ALL MEDS BY PRESCRIBER/CLIN PHARMACIST DOCUMENTED: ICD-10-PCS | Mod: CPTII,S$GLB,, | Performed by: INTERNAL MEDICINE

## 2023-11-01 PROCEDURE — 99214 OFFICE O/P EST MOD 30 MIN: CPT | Mod: S$GLB,,, | Performed by: INTERNAL MEDICINE

## 2023-11-01 PROCEDURE — 3074F SYST BP LT 130 MM HG: CPT | Mod: CPTII,S$GLB,, | Performed by: INTERNAL MEDICINE

## 2023-11-01 PROCEDURE — 3074F PR MOST RECENT SYSTOLIC BLOOD PRESSURE < 130 MM HG: ICD-10-PCS | Mod: CPTII,S$GLB,, | Performed by: INTERNAL MEDICINE

## 2023-11-01 PROCEDURE — 82962 GLUCOSE BLOOD TEST: CPT | Mod: ,,, | Performed by: INTERNAL MEDICINE

## 2023-11-01 PROCEDURE — 1159F MED LIST DOCD IN RCRD: CPT | Mod: CPTII,S$GLB,, | Performed by: INTERNAL MEDICINE

## 2023-11-01 PROCEDURE — 3079F PR MOST RECENT DIASTOLIC BLOOD PRESSURE 80-89 MM HG: ICD-10-PCS | Mod: CPTII,S$GLB,, | Performed by: INTERNAL MEDICINE

## 2023-11-01 PROCEDURE — 3044F HG A1C LEVEL LT 7.0%: CPT | Mod: CPTII,S$GLB,, | Performed by: INTERNAL MEDICINE

## 2023-11-01 PROCEDURE — 3079F DIAST BP 80-89 MM HG: CPT | Mod: CPTII,S$GLB,, | Performed by: INTERNAL MEDICINE

## 2023-11-01 PROCEDURE — 4010F PR ACE/ARB THEARPY RXD/TAKEN: ICD-10-PCS | Mod: CPTII,S$GLB,, | Performed by: INTERNAL MEDICINE

## 2023-11-01 NOTE — PROGRESS NOTES
Subjective:      Patient ID: Adri Thomason is a 58 y.o. female.    Chief Complaint: Follow-up    :  Patient with diabetes with last A1c of 5.4 is at goal.  Patient reports compliance with medication.  Patient is on Mounjaro and is losing weight.  Patient has lost another 3 lb.  Patient blood pressure seem under okay control.  Patient has hypothyroidism and last TSH was at goal.    Review of Systems   Constitutional:  Positive for weight loss (3 lb weight loss). Negative for chills, diaphoresis, fever and malaise/fatigue.   HENT:  Negative for congestion, ear pain, sinus pain, sore throat and tinnitus.    Eyes:  Negative for blurred vision and photophobia.   Respiratory:  Negative for cough, hemoptysis, shortness of breath and wheezing.    Cardiovascular:  Negative for chest pain, palpitations, orthopnea, leg swelling and PND.   Gastrointestinal:  Negative for abdominal pain, blood in stool, constipation, diarrhea, heartburn, melena, nausea and vomiting.   Genitourinary:  Negative for dysuria, frequency and urgency.   Musculoskeletal:  Negative for back pain, myalgias and neck pain.   Skin:  Negative for rash.   Neurological:  Negative for dizziness, tremors, seizures, loss of consciousness and weakness.   Endo/Heme/Allergies:  Negative for polydipsia.   Psychiatric/Behavioral:  Negative for depression and hallucinations. The patient does not have insomnia.      Objective:   /88 (BP Location: Left arm, Patient Position: Sitting, BP Method: Large (Automatic))   Pulse 82   Temp 98 °F (36.7 °C) (Oral)   Resp 18   Ht 5' (1.524 m)   Wt 78.4 kg (172 lb 12.8 oz)   SpO2 98%   BMI 33.75 kg/m²     Physical Exam:  Patient not examined  Assessment:       ICD-10-CM ICD-9-CM   1. Type 2 diabetes mellitus without complication, without long-term current use of insulin  E11.9 250.00   2. Benign essential HTN  I10 401.1   3. Hypothyroidism, unspecified type  E03.9 244.9       Plan:     Patient with diabetes with last A1c  of 5.4 is at goal  Will continue medication  Patient has hypertension and blood pressures are under control with losartan.  Will continue medicine  Patient has hypothyroidism and last TSH was at goal.  Will continue medication  Will check A1c and CMP  Medication List with Changes/Refills   Current Medications    ALBUTEROL (VENTOLIN HFA) 90 MCG/ACTUATION INHALER    INHALE 2 PUFFS INTO THE LUNGS EVERY 4 (FOUR) HOURS AS NEEDED. RESCUE    ALBUTEROL-IPRATROPIUM (DUO-NEB) 2.5 MG-0.5 MG/3 ML NEBULIZER SOLUTION    INHALE 1 VIAL VIA NEBULIZER 4 TIMES A DAY AS NEEDED    ATORVASTATIN (LIPITOR) 20 MG TABLET    TAKE 1 TABLET BY MOUTH EVERY DAY    BREO ELLIPTA 200-25 MCG/DOSE DSDV DISKUS INHALER    INHALE 1 PUFF BY MOUTH INTO THE LUNGS ONCE DAILY.    CALCIUM CARBONATE-VIT D3- MG CALCIUM- 400 UNIT TAB    Take 1 tablet by mouth once daily.    CLOBETASOL 0.05% (TEMOVATE) 0.05 % OINT    Apply topically 2 (two) times daily.    DUPIXENT  MG/2 ML PNIJ        EPINEPHRINE (EPIPEN) 0.3 MG/0.3 ML ATIN    USE 1 INJECTION AS DIRECTED AS NEEDED FOR EMERGENCY USE ONLY    ERGOCALCIFEROL (VITAMIN D2) 50,000 UNIT CAP    Take 50,000 Units by mouth every 7 days.    ESTRADIOL (ESTRACE) 0.01 % (0.1 MG/GRAM) VAGINAL CREAM    Place 1 g vaginally twice a week.    FLUTICASONE PROPIONATE (FLONASE) 50 MCG/ACTUATION NASAL SPRAY    2 sprays by Each Nare route every evening.    LEVOTHYROXINE (SYNTHROID) 112 MCG TABLET    Take 1 tablet (112 mcg total) by mouth before breakfast.    LOSARTAN (COZAAR) 50 MG TABLET    TAKE 1 TABLET BY MOUTH EVERY DAY    MONTELUKAST (SINGULAIR) 10 MG TABLET    TAKE 1 TABLET BY MOUTH EVERY DAY IN THE EVENING    TIRZEPATIDE (MOUNJARO) 5 MG/0.5 ML PNIJ    INJECT 5 MG SUBCUTANEOUSLY EVERY 7 DAYS    UMECLIDINIUM (INCRUSE ELLIPTA) 62.5 MCG/ACTUATION INHALATION CAPSULE    USE 1 INHALATION INTO LUNGS ONCE DAILY AS DIRECTED    UMECLIDINIUM (INCRUSE ELLIPTA) 62.5 MCG/ACTUATION INHALATION CAPSULE    USE 1 INHALATION INTO LUNGS  ONCE DAILY AS DIRECTED

## 2023-11-07 ENCOUNTER — TELEPHONE (OUTPATIENT)
Dept: PULMONOLOGY | Facility: CLINIC | Age: 58
End: 2023-11-07
Payer: COMMERCIAL

## 2023-11-15 ENCOUNTER — OFFICE VISIT (OUTPATIENT)
Dept: PULMONOLOGY | Facility: CLINIC | Age: 58
End: 2023-11-15
Payer: COMMERCIAL

## 2023-11-15 ENCOUNTER — OFFICE VISIT (OUTPATIENT)
Dept: PRIMARY CARE CLINIC | Facility: CLINIC | Age: 58
End: 2023-11-15
Payer: COMMERCIAL

## 2023-11-15 ENCOUNTER — CLINICAL SUPPORT (OUTPATIENT)
Dept: OBSTETRICS AND GYNECOLOGY | Facility: CLINIC | Age: 58
End: 2023-11-15
Payer: COMMERCIAL

## 2023-11-15 VITALS
HEART RATE: 72 BPM | HEIGHT: 60 IN | OXYGEN SATURATION: 98 % | RESPIRATION RATE: 18 BRPM | DIASTOLIC BLOOD PRESSURE: 86 MMHG | WEIGHT: 174.81 LBS | BODY MASS INDEX: 34.32 KG/M2 | SYSTOLIC BLOOD PRESSURE: 124 MMHG

## 2023-11-15 VITALS
DIASTOLIC BLOOD PRESSURE: 85 MMHG | RESPIRATION RATE: 18 BRPM | HEIGHT: 60 IN | HEART RATE: 74 BPM | BODY MASS INDEX: 33.77 KG/M2 | OXYGEN SATURATION: 97 % | WEIGHT: 172 LBS | TEMPERATURE: 98 F | SYSTOLIC BLOOD PRESSURE: 132 MMHG

## 2023-11-15 DIAGNOSIS — J45.909 ASTHMA, UNSPECIFIED ASTHMA SEVERITY, UNSPECIFIED WHETHER COMPLICATED, UNSPECIFIED WHETHER PERSISTENT: Primary | ICD-10-CM

## 2023-11-15 DIAGNOSIS — Z01.89 ROUTINE LAB DRAW: Primary | ICD-10-CM

## 2023-11-15 DIAGNOSIS — L03.211 CELLULITIS OF FACE: ICD-10-CM

## 2023-11-15 DIAGNOSIS — G47.33 OSA ON CPAP: ICD-10-CM

## 2023-11-15 DIAGNOSIS — E66.9 OBESITY, UNSPECIFIED CLASSIFICATION, UNSPECIFIED OBESITY TYPE, UNSPECIFIED WHETHER SERIOUS COMORBIDITY PRESENT: ICD-10-CM

## 2023-11-15 DIAGNOSIS — E11.9 TYPE 2 DIABETES MELLITUS WITHOUT COMPLICATION, WITHOUT LONG-TERM CURRENT USE OF INSULIN: Primary | ICD-10-CM

## 2023-11-15 LAB
ABS NRBC COUNT: 0 THOU/UL (ref 0–0.01)
ABSOLUTE BASOPHIL: 0 10*3/UL (ref 0–0.3)
ABSOLUTE EOSINOPHIL: 0.3 10*3/UL (ref 0–0.6)
ABSOLUTE IMMATURE GRAN: 0.03 THOU/UL (ref 0–0.03)
ABSOLUTE LYMPHOCYTE: 2.1 10*3/UL (ref 1.2–4)
ABSOLUTE MONOCYTE: 0.6 10*3/UL (ref 0.1–0.8)
ALBUMIN SERPL BCP-MCNC: 3.6 G/DL (ref 3.4–5)
ALP SERPL-CCNC: 76 U/L (ref 45–117)
ALT SERPL W P-5'-P-CCNC: 30 U/L (ref 13–56)
ANION GAP SERPL CALC-SCNC: 7 MMOL/L (ref 3–11)
AST SERPL-CCNC: 21 U/L (ref 15–37)
BASOPHILS NFR BLD: 0.3 % (ref 0–3)
BILIRUB SERPL-MCNC: 0.6 MG/DL (ref 0.2–1)
BUN SERPL-MCNC: 16 MG/DL (ref 7–18)
BUN/CREAT SERPL: 29.62 RATIO
CALCIUM SERPL-MCNC: 8.9 MG/DL (ref 8.5–10.1)
CHLORIDE SERPL-SCNC: 107 MMOL/L (ref 98–107)
CO2 SERPL-SCNC: 26 MMOL/L (ref 21–32)
CREAT SERPL-MCNC: 0.54 MG/DL (ref 0.55–1.02)
EOSINOPHIL NFR BLD: 2.7 % (ref 0–6)
ERYTHROCYTE [DISTWIDTH] IN BLOOD BY AUTOMATED COUNT: 12.9 % (ref 0–15.5)
ESTIMATED AVG GLUCOSE: 111 MG/DL
GFR ESTIMATION: > 60
GLUCOSE SERPL-MCNC: 84 MG/DL (ref 74–106)
GLUCOSE SERPL-MCNC: 88 MG/DL (ref 70–110)
HBA1C MFR BLD: 5.3 % (ref 4.2–6.3)
HCT VFR BLD AUTO: 42.6 % (ref 37–47)
HGB BLD-MCNC: 13.9 G/DL (ref 12–16)
IMMATURE GRANULOCYTES: 0.3 % (ref 0–0.43)
LYMPHOCYTES NFR BLD: 20.4 % (ref 20–45)
MCH RBC QN AUTO: 29 PG (ref 27–32)
MCHC RBC AUTO-ENTMCNC: 32.6 % (ref 32–36)
MCV RBC AUTO: 88.8 FL (ref 80–99)
MONOCYTES NFR BLD: 6 % (ref 2–10)
NEUTROPHILS # BLD AUTO: 7.1 10*3/UL (ref 1.4–7)
NEUTROPHILS NFR BLD: 70.3 % (ref 50–80)
NUCLEATED RED BLOOD CELLS: 0 % (ref 0–0.2)
PLATELETS: 283 10*3/UL (ref 130–400)
PMV BLD AUTO: 10.6 FL (ref 9.2–12.2)
POTASSIUM SERPL-SCNC: 4.7 MMOL/L (ref 3.5–5.1)
PROT SERPL-MCNC: 7.1 G/DL (ref 6.4–8.2)
RBC # BLD AUTO: 4.8 10*6/UL (ref 4.2–5.4)
SODIUM BLD-SCNC: 140 MMOL/L (ref 131–143)
WBC # BLD: 10.1 10*3/UL (ref 4.5–10)

## 2023-11-15 PROCEDURE — 1160F RVW MEDS BY RX/DR IN RCRD: CPT | Mod: CPTII,S$GLB,, | Performed by: INTERNAL MEDICINE

## 2023-11-15 PROCEDURE — 3074F SYST BP LT 130 MM HG: CPT | Mod: CPTII,S$GLB,, | Performed by: INTERNAL MEDICINE

## 2023-11-15 PROCEDURE — 82962 GLUCOSE BLOOD TEST: CPT | Mod: ,,, | Performed by: INTERNAL MEDICINE

## 2023-11-15 PROCEDURE — 3008F PR BODY MASS INDEX (BMI) DOCUMENTED: ICD-10-PCS | Mod: CPTII,S$GLB,, | Performed by: INTERNAL MEDICINE

## 2023-11-15 PROCEDURE — 4010F PR ACE/ARB THEARPY RXD/TAKEN: ICD-10-PCS | Mod: CPTII,S$GLB,, | Performed by: INTERNAL MEDICINE

## 2023-11-15 PROCEDURE — 4010F ACE/ARB THERAPY RXD/TAKEN: CPT | Mod: CPTII,S$GLB,, | Performed by: INTERNAL MEDICINE

## 2023-11-15 PROCEDURE — 3075F SYST BP GE 130 - 139MM HG: CPT | Mod: CPTII,S$GLB,, | Performed by: INTERNAL MEDICINE

## 2023-11-15 PROCEDURE — 3008F BODY MASS INDEX DOCD: CPT | Mod: CPTII,S$GLB,, | Performed by: INTERNAL MEDICINE

## 2023-11-15 PROCEDURE — 1159F MED LIST DOCD IN RCRD: CPT | Mod: CPTII,S$GLB,, | Performed by: INTERNAL MEDICINE

## 2023-11-15 PROCEDURE — 3079F PR MOST RECENT DIASTOLIC BLOOD PRESSURE 80-89 MM HG: ICD-10-PCS | Mod: CPTII,S$GLB,, | Performed by: INTERNAL MEDICINE

## 2023-11-15 PROCEDURE — 82962 POCT GLUCOSE, HAND-HELD DEVICE: ICD-10-PCS | Mod: ,,, | Performed by: INTERNAL MEDICINE

## 2023-11-15 PROCEDURE — 3044F HG A1C LEVEL LT 7.0%: CPT | Mod: CPTII,S$GLB,, | Performed by: INTERNAL MEDICINE

## 2023-11-15 PROCEDURE — 3079F DIAST BP 80-89 MM HG: CPT | Mod: CPTII,S$GLB,, | Performed by: INTERNAL MEDICINE

## 2023-11-15 PROCEDURE — 1159F PR MEDICATION LIST DOCUMENTED IN MEDICAL RECORD: ICD-10-PCS | Mod: CPTII,S$GLB,, | Performed by: INTERNAL MEDICINE

## 2023-11-15 PROCEDURE — 36415 COLL VENOUS BLD VENIPUNCTURE: CPT | Mod: S$GLB,,, | Performed by: INTERNAL MEDICINE

## 2023-11-15 PROCEDURE — 1160F PR REVIEW ALL MEDS BY PRESCRIBER/CLIN PHARMACIST DOCUMENTED: ICD-10-PCS | Mod: CPTII,S$GLB,, | Performed by: INTERNAL MEDICINE

## 2023-11-15 PROCEDURE — 3075F PR MOST RECENT SYSTOLIC BLOOD PRESS GE 130-139MM HG: ICD-10-PCS | Mod: CPTII,S$GLB,, | Performed by: INTERNAL MEDICINE

## 2023-11-15 PROCEDURE — 3074F PR MOST RECENT SYSTOLIC BLOOD PRESSURE < 130 MM HG: ICD-10-PCS | Mod: CPTII,S$GLB,, | Performed by: INTERNAL MEDICINE

## 2023-11-15 PROCEDURE — 3044F PR MOST RECENT HEMOGLOBIN A1C LEVEL <7.0%: ICD-10-PCS | Mod: CPTII,S$GLB,, | Performed by: INTERNAL MEDICINE

## 2023-11-15 PROCEDURE — 99214 OFFICE O/P EST MOD 30 MIN: CPT | Mod: S$GLB,,, | Performed by: INTERNAL MEDICINE

## 2023-11-15 PROCEDURE — 36415 PR COLLECTION VENOUS BLOOD,VENIPUNCTURE: ICD-10-PCS | Mod: S$GLB,,, | Performed by: INTERNAL MEDICINE

## 2023-11-15 PROCEDURE — 99214 PR OFFICE/OUTPT VISIT, EST, LEVL IV, 30-39 MIN: ICD-10-PCS | Mod: S$GLB,,, | Performed by: INTERNAL MEDICINE

## 2023-11-15 RX ORDER — CLINDAMYCIN PHOSPHATE 10 MG/G
GEL TOPICAL 2 TIMES DAILY
Qty: 1 EACH | Refills: 1 | Status: SHIPPED | OUTPATIENT
Start: 2023-11-15

## 2023-11-15 RX ORDER — DOXYCYCLINE HYCLATE 100 MG
100 TABLET ORAL 2 TIMES DAILY
Qty: 20 TABLET | Refills: 0 | Status: SHIPPED | OUTPATIENT
Start: 2023-11-15 | End: 2024-02-22

## 2023-11-15 NOTE — PROGRESS NOTES
Subjective:    Patient Identification:   Patient ID: Adri Thomason is a 58 y.o. female.    Referring Provider:  Established patient    Chief Complaint:  Follow-up on severe asthma and obstructive sleep    History of Present Illness:    Adri Thomason is a 58 y.o. female who presents for the follow-up on asthma and obstructive sleep apnea.  She denies any respiratory complaints, although her  had a little cold lately and she felt little congested but nothing significant.  He continues to be on Dupixent, Incruse and Breo and is very compliant.  He did not have to use her rescue inhaler since she has been on Dupixent.  No asthma exacerbations since September 2022 and no need for systemic steroids since then.  CPAP compliance is great but lately has not been able to use it due to some family reasons.  She will get back towards and improved compliance soon.  Her [place] Sleepiness scale score is 2 today.  Ninety-fifth percentile leak is 16 L with pressure of 10.4 and residual AHI of 0.9.  Her labs were not drawn prior to this appointment will will be done today.  She continues to lose weight and has felt better since she started doing it.    Review of Systems:  Review of Systems   Constitutional:  Negative for fever, chills, weight loss, weight gain, activity change, appetite change, fatigue, night sweats and weakness.   HENT:  Negative for nosebleeds, postnasal drip, rhinorrhea, sinus pressure, sore throat, trouble swallowing, voice change, congestion, ear pain and hearing loss.    Eyes:  Negative for redness and itching.   Respiratory:  Negative for cough, hemoptysis, sputum production, choking, chest tightness, shortness of breath, wheezing, orthopnea, previous hospitialization due to pulmonary problems, asthma nighttime symptoms, pleurisy, dyspnea on extertion, use of rescue inhaler and Paroxysmal Nocturnal Dyspnea.    Cardiovascular:  Negative for chest pain, palpitations and leg swelling.   Genitourinary:   Negative for difficulty urinating and hematuria.   Endocrine:  Negative for polydipsia, polyphagia, cold intolerance, heat intolerance and polyuria.    Musculoskeletal:  Negative for arthralgias, back pain, gait problem, joint swelling and myalgias.   Skin:  Negative for rash.   Gastrointestinal:  Negative for nausea, vomiting, abdominal pain, abdominal distention and acid reflux.   Neurological:  Negative for dizziness, syncope, weakness, light-headedness and headaches.   Hematological:  Negative for adenopathy. Does not bruise/bleed easily and no excessive bruising.   Psychiatric/Behavioral:  Negative for confusion and sleep disturbance. The patient is not nervous/anxious.          Allergies:   Review of patient's allergies indicates:   Allergen Reactions    Codeine Hives    Nsaids (non-steroidal anti-inflammatory drug) Anaphylaxis, Hives and Shortness Of Breath    Sulfa (sulfonamide antibiotics) Rash    Morphine        Medications:      Past Medical History:      Past Medical History:   Diagnosis Date    Allergy     Asthma     HTN (hypertension)        Family History:      Family History   Problem Relation Age of Onset    Diabetes Mother     Diabetes Father     Asthma Father     Asthma Sister     Asthma Paternal Aunt     Asthma Paternal Grandmother     Asthma Paternal Aunt     Asthma Paternal Aunt     Asthma Paternal Aunt     Asthma Paternal Aunt     Asthma Paternal Aunt         Social History:      Past Surgical History:   Procedure Laterality Date     SECTION      x2       Physical Exam:  Vitals:    11/15/23 1032   BP: 124/86   Pulse: 72   Resp: 18     Physical Exam   Constitutional: She is oriented to person, place, and time. She appears not cachectic. No distress.   HENT:   Head: Normocephalic.   Right Ear: External ear normal.   Left Ear: External ear normal.   Nose: Nose normal. No mucosal edema. No polyps.   Mouth/Throat: Oropharynx is clear and moist. Normal dentition. No oropharyngeal exudate.    Neck: No JVD present. No tracheal deviation present. No thyromegaly present.   Cardiovascular: Normal rate, regular rhythm, normal heart sounds and intact distal pulses. Exam reveals no gallop and no friction rub.   No murmur heard.  Pulmonary/Chest: Normal expansion, symmetric chest wall expansion, effort normal and breath sounds normal. No stridor. No respiratory distress. She has no decreased breath sounds. She has no wheezes. She has no rhonchi. She has no rales. Chest wall is not dull to percussion. She exhibits no tenderness. Negative for egophony. Negative for tactile fremitus.   Abdominal: Soft. Bowel sounds are normal. She exhibits no distension and no mass. There is no hepatosplenomegaly. There is no abdominal tenderness. There is no rebound and no guarding. No hernia.   Musculoskeletal:         General: No tenderness, deformity or edema. Normal range of motion.      Cervical back: Normal range of motion and neck supple.   Lymphadenopathy: No supraclavicular adenopathy is present.     She has no cervical adenopathy.     She has no axillary adenopathy.   Neurological: She is alert and oriented to person, place, and time. She has normal reflexes. She displays normal reflexes. No cranial nerve deficit. She exhibits normal muscle tone.   Skin: Skin is warm and dry. No rash noted. She is not diaphoretic. No cyanosis or erythema. No pallor. Nails show no clubbing.   Psychiatric: She has a normal mood and affect. Her behavior is normal. Judgment and thought content normal.           Accessory Clinical Data:  Chest x-ray:    CT scan:     PFTs:     6MWT:     TTE:    Lab data:    All radiographic imaging of the chest, PFT tracings/data, and 6MWT data have been independently reviewed and interpreted unless otherwise specified.     Assessment and Plan:        Problem List Items Addressed This Visit          Pulmonary    Asthma - Primary       Endocrine    Obesity     Other Visit Diagnoses       [place] on CPAP                No orders of the defined types were placed in this encounter.     Continue with Dupixent, Incruse and Breo.    Follow-up on peripheral eosinophil count as Dupixent can worsen eosinophilia.    Continue to get age and seasonal appropriate preventative vaccinations.    Continue to avoid asthma triggers.  Counseled on improving CPAP compliance which she would.  She has not been sleeping enough due to a family function.  Many occasions he goes to bed late and wakes up early and then forgets to put the machine on.  Advised to use the machine every time she goes to sleep.  Sleep apnea symptoms are well controlled on current CPAP therapy.          Follow up in about 4 months (around 3/15/2024).     This note is dictated on M*Modal word recognition program.  There are word recognition mistakes that are occasionally missed on review.

## 2023-11-15 NOTE — PROGRESS NOTES
Subjective:      Patient ID: Adri Thomason is a 58 y.o. female.    Chief Complaint: Rash (Pt c/o rash underneath left eye x 2 weeks. )    :  Patient with diabetes with last A1c 5.4 is at goal.  Patient is on Mounjaro and did not lose anymore weight..    Patient today complained of rash on the face x few weeks.  Rashes erythematous, without itching, no oozing or discharge noted.  Patient also reports a small rash coming on the left temporal area.  Patient denies any fever or chill.  Patient has put triple antibiotic and prednisone on it without much help    Review of Systems   Constitutional:  Negative for chills, diaphoresis, fever, malaise/fatigue and weight loss.   HENT:  Negative for congestion, ear pain, sinus pain, sore throat and tinnitus.    Eyes:  Negative for blurred vision and photophobia.   Respiratory:  Negative for cough, hemoptysis, shortness of breath and wheezing.    Cardiovascular:  Negative for chest pain, palpitations, orthopnea, leg swelling and PND.   Gastrointestinal:  Negative for abdominal pain, blood in stool, constipation, diarrhea, heartburn, melena, nausea and vomiting.   Genitourinary:  Negative for dysuria, frequency and urgency.   Musculoskeletal:  Negative for back pain, myalgias and neck pain.   Skin:  Positive for rash.   Neurological:  Negative for dizziness, tremors, seizures, loss of consciousness and weakness.   Endo/Heme/Allergies:  Negative for polydipsia.   Psychiatric/Behavioral:  Negative for depression and hallucinations. The patient does not have insomnia.      Objective:   /85 (BP Location: Right arm, Patient Position: Sitting, BP Method: Large (Automatic))   Pulse 74   Temp 98 °F (36.7 °C) (Oral)   Resp 18   Ht 5' (1.524 m)   Wt 78 kg (172 lb)   SpO2 97%   BMI 33.59 kg/m²     Physical Exam:  1 cm round area of erythema with small pustule.                               Small 0.5 cm area of erythema noted on left temporal area    Assessment:       ICD-10-CM  ICD-9-CM   1. Type 2 diabetes mellitus without complication, without long-term current use of insulin  E11.9 250.00       Plan:     Patient with diabetes with last A1c of 5.4 is at goal  Patient has lost significant weight.  Will continue Mounjaro  Patient has erythematous lesion on face that seem infected.  Will use clindamycin cream and doxycycline    Medication List with Changes/Refills   Current Medications    ALBUTEROL (VENTOLIN HFA) 90 MCG/ACTUATION INHALER    INHALE 2 PUFFS INTO THE LUNGS EVERY 4 (FOUR) HOURS AS NEEDED. RESCUE    ALBUTEROL-IPRATROPIUM (DUO-NEB) 2.5 MG-0.5 MG/3 ML NEBULIZER SOLUTION    INHALE 1 VIAL VIA NEBULIZER 4 TIMES A DAY AS NEEDED    ATORVASTATIN (LIPITOR) 20 MG TABLET    TAKE 1 TABLET BY MOUTH EVERY DAY    BREO ELLIPTA 200-25 MCG/DOSE DSDV DISKUS INHALER    INHALE 1 PUFF BY MOUTH INTO THE LUNGS ONCE DAILY.    CALCIUM CARBONATE-VIT D3- MG CALCIUM- 400 UNIT TAB    Take 1 tablet by mouth once daily.    CLOBETASOL 0.05% (TEMOVATE) 0.05 % OINT    Apply topically 2 (two) times daily.    DUPIXENT  MG/2 ML PNIJ        EPINEPHRINE (EPIPEN) 0.3 MG/0.3 ML ATIN    USE 1 INJECTION AS DIRECTED AS NEEDED FOR EMERGENCY USE ONLY    ERGOCALCIFEROL (VITAMIN D2) 50,000 UNIT CAP    Take 50,000 Units by mouth every 7 days.    ESTRADIOL (ESTRACE) 0.01 % (0.1 MG/GRAM) VAGINAL CREAM    Place 1 g vaginally twice a week.    FLUTICASONE PROPIONATE (FLONASE) 50 MCG/ACTUATION NASAL SPRAY    2 sprays by Each Nare route every evening.    LEVOTHYROXINE (SYNTHROID) 112 MCG TABLET    Take 1 tablet (112 mcg total) by mouth before breakfast.    LOSARTAN (COZAAR) 50 MG TABLET    TAKE 1 TABLET BY MOUTH EVERY DAY    MONTELUKAST (SINGULAIR) 10 MG TABLET    TAKE 1 TABLET BY MOUTH EVERY DAY IN THE EVENING    TIRZEPATIDE (MOUNJARO) 5 MG/0.5 ML PNIJ    INJECT 5 MG SUBCUTANEOUSLY EVERY 7 DAYS    UMECLIDINIUM (INCRUSE ELLIPTA) 62.5 MCG/ACTUATION INHALATION CAPSULE    USE 1 INHALATION INTO LUNGS ONCE DAILY AS DIRECTED     UMECLIDINIUM (INCRUSE ELLIPTA) 62.5 MCG/ACTUATION INHALATION CAPSULE    USE 1 INHALATION INTO LUNGS ONCE DAILY AS DIRECTED

## 2023-12-05 RX ORDER — ERGOCALCIFEROL 1.25 MG/1
50000 CAPSULE ORAL
Qty: 4 CAPSULE | Refills: 11 | Status: SHIPPED | OUTPATIENT
Start: 2023-12-05 | End: 2024-11-04

## 2024-01-19 ENCOUNTER — TELEPHONE (OUTPATIENT)
Dept: PRIMARY CARE CLINIC | Facility: CLINIC | Age: 59
End: 2024-01-19
Payer: COMMERCIAL

## 2024-01-19 NOTE — TELEPHONE ENCOUNTER
----- Message from Isabelle Lambert sent at 1/19/2024  3:22 PM CST -----  Contact: Carmella(Dr. Curry)  Carmella called to consult with nurse or staff regarding the patient. She states their office is needing the results of the patients blood work/cholesterol panels and would like that information sent over. Carmella left a call back number of [phone] with fax [phone]. Thanks/MR

## 2024-01-26 DIAGNOSIS — J45.50 SEVERE PERSISTENT ASTHMA WITH ALLERGIC RHINITIS, UNSPECIFIED WHETHER COMPLICATED: Primary | ICD-10-CM

## 2024-01-26 RX ORDER — DUPILUMAB 300 MG/2ML
INJECTION, SOLUTION SUBCUTANEOUS
Qty: 4 ML | Refills: 11 | Status: SHIPPED | OUTPATIENT
Start: 2024-01-26

## 2024-02-22 ENCOUNTER — OFFICE VISIT (OUTPATIENT)
Dept: PRIMARY CARE CLINIC | Facility: CLINIC | Age: 59
End: 2024-02-22
Payer: COMMERCIAL

## 2024-02-22 VITALS
HEIGHT: 60 IN | HEART RATE: 70 BPM | OXYGEN SATURATION: 98 % | DIASTOLIC BLOOD PRESSURE: 82 MMHG | SYSTOLIC BLOOD PRESSURE: 136 MMHG | BODY MASS INDEX: 34.04 KG/M2 | TEMPERATURE: 98 F | WEIGHT: 173.38 LBS | RESPIRATION RATE: 16 BRPM

## 2024-02-22 DIAGNOSIS — E03.9 HYPOTHYROIDISM, UNSPECIFIED TYPE: ICD-10-CM

## 2024-02-22 DIAGNOSIS — E11.9 TYPE 2 DIABETES MELLITUS WITHOUT COMPLICATION, WITHOUT LONG-TERM CURRENT USE OF INSULIN: ICD-10-CM

## 2024-02-22 DIAGNOSIS — I10 BENIGN ESSENTIAL HTN: ICD-10-CM

## 2024-02-22 DIAGNOSIS — R21 RASH: ICD-10-CM

## 2024-02-22 DIAGNOSIS — Z00.00 ROUTINE GENERAL MEDICAL EXAMINATION AT A HEALTH CARE FACILITY: Primary | ICD-10-CM

## 2024-02-22 DIAGNOSIS — J45.50 SEVERE PERSISTENT ASTHMA, UNSPECIFIED WHETHER COMPLICATED: ICD-10-CM

## 2024-02-22 LAB — GLUCOSE SERPL-MCNC: 71 MG/DL (ref 70–110)

## 2024-02-22 PROCEDURE — 1160F RVW MEDS BY RX/DR IN RCRD: CPT | Mod: CPTII,S$GLB,, | Performed by: INTERNAL MEDICINE

## 2024-02-22 PROCEDURE — 3079F DIAST BP 80-89 MM HG: CPT | Mod: CPTII,S$GLB,, | Performed by: INTERNAL MEDICINE

## 2024-02-22 PROCEDURE — 1159F MED LIST DOCD IN RCRD: CPT | Mod: CPTII,S$GLB,, | Performed by: INTERNAL MEDICINE

## 2024-02-22 PROCEDURE — 3061F NEG MICROALBUMINURIA REV: CPT | Mod: CPTII,S$GLB,, | Performed by: INTERNAL MEDICINE

## 2024-02-22 PROCEDURE — 4010F ACE/ARB THERAPY RXD/TAKEN: CPT | Mod: CPTII,S$GLB,, | Performed by: INTERNAL MEDICINE

## 2024-02-22 PROCEDURE — 3075F SYST BP GE 130 - 139MM HG: CPT | Mod: CPTII,S$GLB,, | Performed by: INTERNAL MEDICINE

## 2024-02-22 PROCEDURE — 3066F NEPHROPATHY DOC TX: CPT | Mod: CPTII,S$GLB,, | Performed by: INTERNAL MEDICINE

## 2024-02-22 PROCEDURE — 3044F HG A1C LEVEL LT 7.0%: CPT | Mod: CPTII,S$GLB,, | Performed by: INTERNAL MEDICINE

## 2024-02-22 PROCEDURE — 3008F BODY MASS INDEX DOCD: CPT | Mod: CPTII,S$GLB,, | Performed by: INTERNAL MEDICINE

## 2024-02-22 PROCEDURE — 99396 PREV VISIT EST AGE 40-64: CPT | Mod: S$GLB,,, | Performed by: INTERNAL MEDICINE

## 2024-02-22 PROCEDURE — 82962 GLUCOSE BLOOD TEST: CPT | Mod: ,,, | Performed by: INTERNAL MEDICINE

## 2024-02-22 RX ORDER — KETOCONAZOLE 20 MG/G
CREAM TOPICAL DAILY
Qty: 30 G | Refills: 1 | Status: SHIPPED | OUTPATIENT
Start: 2024-02-22

## 2024-02-22 RX ORDER — MUPIROCIN 20 MG/G
OINTMENT TOPICAL 3 TIMES DAILY
Qty: 30 G | Refills: 0 | Status: SHIPPED | OUTPATIENT
Start: 2024-02-22 | End: 2024-02-22

## 2024-02-22 NOTE — PROGRESS NOTES
Subjective:      Patient ID: Adri Thomason is a 58 y.o. female.    Chief Complaint: Rash (States Rash under left arm noticed 1 month prior )    HPI:  Patient is here for annual wellness exam.    Patient has diabetes with last A1c of 5.5 is at goal.  Patient is on Mounjaro and tolerating the medication well.  Patient is not losing anymore weight.     Patient today complains of a rash in the left axilla.  Patient reports lesion come and go, round circular with raised border, lesion is itchy, no blister formation, no oozing or discharge.     Review of Systems   Constitutional:  Negative for chills, diaphoresis, fever, malaise/fatigue and weight loss.   HENT:  Negative for congestion, ear pain, sinus pain, sore throat and tinnitus.    Eyes:  Negative for blurred vision and photophobia.   Respiratory:  Negative for cough, hemoptysis, shortness of breath and wheezing.    Cardiovascular:  Negative for chest pain, palpitations, orthopnea, leg swelling and PND.   Gastrointestinal:  Negative for abdominal pain, blood in stool, constipation, diarrhea, heartburn, melena, nausea and vomiting.   Genitourinary:  Negative for dysuria, frequency and urgency.   Musculoskeletal:  Negative for back pain, myalgias and neck pain.   Skin:  Negative for rash.   Neurological:  Negative for dizziness, tremors, seizures, loss of consciousness and weakness.   Endo/Heme/Allergies:  Negative for polydipsia.   Psychiatric/Behavioral:  Negative for depression and hallucinations. The patient does not have insomnia.      Objective:   /82 (BP Location: Left arm, Patient Position: Sitting, BP Method: Medium (Automatic))   Pulse 70   Temp 98.3 °F (36.8 °C) (Oral)   Resp 16   Ht 5' (1.524 m)   Wt 78.7 kg (173 lb 6.4 oz)   SpO2 98%   BMI 33.86 kg/m²   Physical Exam  Constitutional:       General: She is not in acute distress.     Appearance: She is not diaphoretic.   Neck:      Thyroid: No thyromegaly.   Cardiovascular:      Rate and Rhythm:  Normal rate and regular rhythm.      Heart sounds: Normal heart sounds. No murmur heard.  Pulmonary:      Effort: Pulmonary effort is normal. No respiratory distress.      Breath sounds: Normal breath sounds. No wheezing.   Abdominal:      General: Bowel sounds are normal. There is no distension.      Palpations: Abdomen is soft.      Tenderness: There is no abdominal tenderness.   Lymphadenopathy:      Cervical: No cervical adenopathy.   Skin:     Comments: 3 cm round lesion with raised edges, central sparing noted,   Nontender to palpation.   Neurological:      Mental Status: She is alert and oriented to person, place, and time.   Psychiatric:         Behavior: Behavior normal.         Thought Content: Thought content normal.         Judgment: Judgment normal.       Assessment:       ICD-10-CM ICD-9-CM   1. Type 2 diabetes mellitus without complication, without long-term current use of insulin  E11.9 250.00   2. Severe persistent asthma, unspecified whether complicated  J45.50 493.90   3. Rash  R21 782.1   4. Benign essential HTN  I10 401.1   5. Hypothyroidism, unspecified type  E03.9 244.9       Plan:     Patient is here for wellness exam, Will order annual labs  Advised patient to get eye exam and shingle vaccine  Patient last A1c of 5.5 is at goal.  Will continue medication  Patient was losing weight Mounjaro but has not been losing more weight.  Will increase dose of Mounjaro 7.5 mg  Patient has persistent asthma that is under good control with Dupixent and inhalers  Patient is taking Breo and Incruse with much help..  Will give Trelegy as sample as may help with cost  Patient has hypertension blood pressure seem under okay control.  Will continue medication  Patient has hypothyroidism and last TSH was at goal.  Will continue medicine  Patient has rash that is appear fundal  Will use ketoconazole cream      Medication List with Changes/Refills   New Medications    KETOCONAZOLE (NIZORAL) 2 % CREAM    Apply  topically once daily.    TIRZEPATIDE 7.5 MG/0.5 ML PNIJ    Inject 7.5 mg into the skin every 7 days.   Current Medications    ALBUTEROL (VENTOLIN HFA) 90 MCG/ACTUATION INHALER    INHALE 2 PUFFS INTO THE LUNGS EVERY 4 (FOUR) HOURS AS NEEDED. RESCUE    ALBUTEROL-IPRATROPIUM (DUO-NEB) 2.5 MG-0.5 MG/3 ML NEBULIZER SOLUTION    INHALE 1 VIAL VIA NEBULIZER 4 TIMES A DAY AS NEEDED    ATORVASTATIN (LIPITOR) 20 MG TABLET    TAKE 1 TABLET BY MOUTH EVERY DAY    BREO ELLIPTA 200-25 MCG/DOSE DSDV DISKUS INHALER    INHALE 1 PUFF BY MOUTH INTO THE LUNGS ONCE DAILY.    CALCIUM CARBONATE-VIT D3- MG CALCIUM- 400 UNIT TAB    Take 1 tablet by mouth once daily.    CLINDAMYCIN PHOSPHATE 1% (CLINDAGEL) 1 % GEL    Apply topically 2 (two) times daily.    CLOBETASOL 0.05% (TEMOVATE) 0.05 % OINT    Apply topically 2 (two) times daily.    DUPILUMAB (DUPIXENT PEN) 300 MG/2 ML PNIJ    INJECT 1 PEN (300 MG) UNDER THE SKIN EVERY 2 WEEKS    EPINEPHRINE (EPIPEN) 0.3 MG/0.3 ML ATIN    USE 1 INJECTION AS DIRECTED AS NEEDED FOR EMERGENCY USE ONLY    ERGOCALCIFEROL (VITAMIN D2) 50,000 UNIT CAP    Take 1 capsule (50,000 Units total) by mouth every 7 days.    FLUTICASONE PROPIONATE (FLONASE) 50 MCG/ACTUATION NASAL SPRAY    2 sprays by Each Nare route every evening.    LEVOTHYROXINE (SYNTHROID) 112 MCG TABLET    Take 1 tablet (112 mcg total) by mouth before breakfast.    LOSARTAN (COZAAR) 50 MG TABLET    TAKE 1 TABLET BY MOUTH EVERY DAY    MONTELUKAST (SINGULAIR) 10 MG TABLET    TAKE 1 TABLET BY MOUTH EVERY DAY IN THE EVENING    UMECLIDINIUM (INCRUSE ELLIPTA) 62.5 MCG/ACTUATION INHALATION CAPSULE    USE 1 INHALATION INTO LUNGS ONCE DAILY AS DIRECTED   Discontinued Medications    DOXYCYCLINE (VIBRA-TABS) 100 MG TABLET    Take 1 tablet (100 mg total) by mouth 2 (two) times daily.    ESTRADIOL (ESTRACE) 0.01 % (0.1 MG/GRAM) VAGINAL CREAM    Place 1 g vaginally twice a week.    TIRZEPATIDE (MOUNJARO) 5 MG/0.5 ML PNIJ    INJECT 5 MG SUBCUTANEOUSLY EVERY 7  DAYS    UMECLIDINIUM (INCRUSE ELLIPTA) 62.5 MCG/ACTUATION INHALATION CAPSULE    USE 1 INHALATION INTO LUNGS ONCE DAILY AS DIRECTED

## 2024-02-23 ENCOUNTER — CLINICAL SUPPORT (OUTPATIENT)
Dept: OBSTETRICS AND GYNECOLOGY | Facility: CLINIC | Age: 59
End: 2024-02-23
Payer: COMMERCIAL

## 2024-02-23 DIAGNOSIS — Z01.89 ROUTINE LAB DRAW: Primary | ICD-10-CM

## 2024-02-23 LAB
ABS NRBC COUNT: 0 THOU/UL (ref 0–0.01)
ABSOLUTE BASOPHIL: 0 10*3/UL (ref 0–0.3)
ABSOLUTE EOSINOPHIL: 0.2 10*3/UL (ref 0–0.6)
ABSOLUTE IMMATURE GRAN: 0.02 THOU/UL (ref 0–0.03)
ABSOLUTE LYMPHOCYTE: 1.9 10*3/UL (ref 1.2–4)
ABSOLUTE MONOCYTE: 0.5 10*3/UL (ref 0.1–0.8)
ALBUMIN SERPL BCP-MCNC: 3.5 G/DL (ref 3.4–5)
ALP SERPL-CCNC: 70 U/L (ref 45–117)
ALT SERPL W P-5'-P-CCNC: 17 U/L (ref 13–56)
ANION GAP SERPL CALC-SCNC: 5 MMOL/L (ref 3–11)
AST SERPL-CCNC: 15 U/L (ref 15–37)
BASOPHILS NFR BLD: 0.5 % (ref 0–3)
BILIRUB SERPL-MCNC: 0.6 MG/DL (ref 0.2–1)
BUN SERPL-MCNC: 18 MG/DL (ref 7–18)
BUN/CREAT SERPL: 25.71 RATIO
CALCIUM SERPL-MCNC: 9 MG/DL (ref 8.5–10.1)
CHLORIDE SERPL-SCNC: 108 MMOL/L (ref 98–107)
CHOLEST SERPL-MSCNC: 132 MG/DL
CO2 SERPL-SCNC: 28 MMOL/L (ref 21–32)
CREAT SERPL-MCNC: 0.7 MG/DL (ref 0.55–1.02)
CREATININE, URINE: 106.1 MG/DL (ref 10–175)
EOSINOPHIL NFR BLD: 3.2 % (ref 0–6)
ERYTHROCYTE [DISTWIDTH] IN BLOOD BY AUTOMATED COUNT: 12.8 % (ref 0–15.5)
GFR ESTIMATION: > 60
GLUCOSE SERPL-MCNC: 90 MG/DL (ref 74–106)
HBA1C MFR BLD: 5.5 % (ref 4.5–6.2)
HCT VFR BLD AUTO: 42.7 % (ref 37–47)
HDLC SERPL-MCNC: 63 MG/DL
HGB BLD-MCNC: 13.5 G/DL (ref 12–16)
IMMATURE GRANULOCYTES: 0.3 % (ref 0–0.43)
LDLC SERPL CALC-MCNC: 56 MG/DL
LYMPHOCYTES NFR BLD: 26 % (ref 20–45)
MCH RBC QN AUTO: 28.2 PG (ref 27–32)
MCHC RBC AUTO-ENTMCNC: 31.6 % (ref 32–36)
MCV RBC AUTO: 89.1 FL (ref 80–99)
MICROALBUMIN URINE: 1.4 MG/L (ref 0–20)
MICROALBUMIN/CREATININE RATIO: 1 MG/G (ref 0–30)
MONOCYTES NFR BLD: 7.1 % (ref 2–10)
NEUTROPHILS # BLD AUTO: 4.7 10*3/UL (ref 1.4–7)
NEUTROPHILS NFR BLD: 62.9 % (ref 50–80)
NUCLEATED RED BLOOD CELLS: 0 % (ref 0–0.2)
PLATELETS: 281 10*3/UL (ref 130–400)
PMV BLD AUTO: 10.6 FL (ref 9.2–12.2)
POTASSIUM SERPL-SCNC: 4.2 MMOL/L (ref 3.5–5.1)
PROT SERPL-MCNC: 6.9 G/DL (ref 6.4–8.2)
RBC # BLD AUTO: 4.79 10*6/UL (ref 4.2–5.4)
SODIUM BLD-SCNC: 141 MMOL/L (ref 131–143)
TRIGL SERPL-MCNC: 65 MG/DL (ref 0–149)
TSH SERPL DL<=0.005 MIU/L-ACNC: 2.3 UIU/ML (ref 0.36–3.74)
VLDL CHOLESTEROL: 13 MG/DL
WBC # BLD: 7.5 10*3/UL (ref 4.5–10)

## 2024-02-23 PROCEDURE — 36415 COLL VENOUS BLD VENIPUNCTURE: CPT | Mod: S$GLB,,, | Performed by: INTERNAL MEDICINE

## 2024-02-26 DIAGNOSIS — J45.50 SEVERE PERSISTENT ASTHMA WITH ALLERGIC RHINITIS, UNSPECIFIED WHETHER COMPLICATED: Primary | ICD-10-CM

## 2024-02-26 DIAGNOSIS — J45.909 ASTHMA, UNSPECIFIED ASTHMA SEVERITY, UNSPECIFIED WHETHER COMPLICATED, UNSPECIFIED WHETHER PERSISTENT: ICD-10-CM

## 2024-02-26 RX ORDER — FLUTICASONE FUROATE, UMECLIDINIUM BROMIDE AND VILANTEROL TRIFENATATE 200; 62.5; 25 UG/1; UG/1; UG/1
1 POWDER RESPIRATORY (INHALATION) DAILY
Qty: 1 EACH | Refills: 3 | Status: SHIPPED | OUTPATIENT
Start: 2024-02-26 | End: 2024-03-27

## 2024-03-30 DIAGNOSIS — I10 BENIGN ESSENTIAL HTN: ICD-10-CM

## 2024-03-30 DIAGNOSIS — J45.909 UNSPECIFIED ASTHMA, UNCOMPLICATED: ICD-10-CM

## 2024-04-01 ENCOUNTER — OFFICE VISIT (OUTPATIENT)
Dept: PULMONOLOGY | Facility: CLINIC | Age: 59
End: 2024-04-01
Payer: COMMERCIAL

## 2024-04-01 VITALS
RESPIRATION RATE: 18 BRPM | SYSTOLIC BLOOD PRESSURE: 133 MMHG | OXYGEN SATURATION: 97 % | WEIGHT: 171.63 LBS | HEIGHT: 60 IN | DIASTOLIC BLOOD PRESSURE: 86 MMHG | BODY MASS INDEX: 33.7 KG/M2 | HEART RATE: 67 BPM

## 2024-04-01 DIAGNOSIS — G47.33 OSA ON CPAP: ICD-10-CM

## 2024-04-01 DIAGNOSIS — J45.909 SEVERE ASTHMA, UNSPECIFIED WHETHER COMPLICATED, UNSPECIFIED WHETHER PERSISTENT: Primary | ICD-10-CM

## 2024-04-01 DIAGNOSIS — E66.9 OBESITY (BMI 30.0-34.9): ICD-10-CM

## 2024-04-01 PROCEDURE — 3079F DIAST BP 80-89 MM HG: CPT | Mod: CPTII,S$GLB,, | Performed by: INTERNAL MEDICINE

## 2024-04-01 PROCEDURE — 3044F HG A1C LEVEL LT 7.0%: CPT | Mod: CPTII,S$GLB,, | Performed by: INTERNAL MEDICINE

## 2024-04-01 PROCEDURE — 1159F MED LIST DOCD IN RCRD: CPT | Mod: CPTII,S$GLB,, | Performed by: INTERNAL MEDICINE

## 2024-04-01 PROCEDURE — 3066F NEPHROPATHY DOC TX: CPT | Mod: CPTII,S$GLB,, | Performed by: INTERNAL MEDICINE

## 2024-04-01 PROCEDURE — 99213 OFFICE O/P EST LOW 20 MIN: CPT | Mod: S$GLB,,, | Performed by: INTERNAL MEDICINE

## 2024-04-01 PROCEDURE — 3061F NEG MICROALBUMINURIA REV: CPT | Mod: CPTII,S$GLB,, | Performed by: INTERNAL MEDICINE

## 2024-04-01 PROCEDURE — 4010F ACE/ARB THERAPY RXD/TAKEN: CPT | Mod: CPTII,S$GLB,, | Performed by: INTERNAL MEDICINE

## 2024-04-01 PROCEDURE — 3008F BODY MASS INDEX DOCD: CPT | Mod: CPTII,S$GLB,, | Performed by: INTERNAL MEDICINE

## 2024-04-01 PROCEDURE — 1160F RVW MEDS BY RX/DR IN RCRD: CPT | Mod: CPTII,S$GLB,, | Performed by: INTERNAL MEDICINE

## 2024-04-01 PROCEDURE — 3075F SYST BP GE 130 - 139MM HG: CPT | Mod: CPTII,S$GLB,, | Performed by: INTERNAL MEDICINE

## 2024-04-01 RX ORDER — MONTELUKAST SODIUM 10 MG/1
TABLET ORAL
Qty: 90 TABLET | Refills: 3 | Status: SHIPPED | OUTPATIENT
Start: 2024-04-01

## 2024-04-01 RX ORDER — LOSARTAN POTASSIUM 50 MG/1
TABLET ORAL
Qty: 90 TABLET | Refills: 3 | Status: SHIPPED | OUTPATIENT
Start: 2024-04-01

## 2024-04-01 NOTE — PROGRESS NOTES
Subjective:    Patient Identification:   Patient ID: Adri Thomason is a 58 y.o. female.    Referring Provider:  Established Patient    Chief Complaint:  asthma follow up  Follow-up on obstructive sleep apnea    History of Present Illness:    Adri Thomason is a 58 y.o. female who presents for a routine follow-up on her severe asthma.  She remains on Dupixent which she takes biweekly.  No complications or side effects.  She has continued to lose weight almost 2 lb a month which has helped her overall well-being, control of asthma as well as obstructive sleep apnea.  Feels more energetic.  Recently went to [place] in and was able to walk with the family without carrying her nebulizer.  Used to carry her nebulizer even while she would go to [place] for frequent attacks.  She says that she is compliant with his CPAP and even on days when she does not use it she is not snoring anymore.    Planning to go to [facility] in August and is wondering if she should take her CPAP with her.    Review of Systems:  Review of Systems   Constitutional:  Negative for fever, chills, weight loss, weight gain, activity change, appetite change, fatigue, night sweats and weakness.   HENT:  Negative for nosebleeds, postnasal drip, rhinorrhea, sinus pressure, sore throat, trouble swallowing, voice change, congestion, ear pain and hearing loss.    Eyes:  Negative for redness and itching.   Respiratory:  Negative for cough, hemoptysis, sputum production, choking, chest tightness, shortness of breath, wheezing, orthopnea, previous hospitialization due to pulmonary problems, asthma nighttime symptoms, pleurisy, dyspnea on extertion, use of rescue inhaler and Paroxysmal Nocturnal Dyspnea.    Cardiovascular:  Negative for chest pain, palpitations and leg swelling.   Genitourinary:  Negative for difficulty urinating and hematuria.   Endocrine:  Negative for polydipsia, polyphagia, cold intolerance, heat intolerance and polyuria.    Musculoskeletal:   Negative for arthralgias, back pain, gait problem, joint swelling and myalgias.   Skin:  Negative for rash.   Gastrointestinal:  Negative for nausea, vomiting, abdominal pain, abdominal distention and acid reflux.   Neurological:  Negative for dizziness, syncope, weakness, light-headedness and headaches.   Hematological:  Negative for adenopathy. Does not bruise/bleed easily and no excessive bruising.   Psychiatric/Behavioral:  Negative for confusion and sleep disturbance. The patient is not nervous/anxious.          Allergies:   Review of patient's allergies indicates:   Allergen Reactions    Codeine Hives    Nsaids (non-steroidal anti-inflammatory drug) Anaphylaxis, Hives and Shortness Of Breath    Sulfa (sulfonamide antibiotics) Rash    Morphine        Medications:      Past Medical History:      Past Medical History:   Diagnosis Date    Allergy     Asthma     HTN (hypertension)        Family History:      Family History   Problem Relation Age of Onset    Diabetes Mother     Diabetes Father     Asthma Father     Asthma Sister     Asthma Paternal Aunt     Asthma Paternal Grandmother     Asthma Paternal Aunt     Asthma Paternal Aunt     Asthma Paternal Aunt     Asthma Paternal Aunt     Asthma Paternal Aunt         Social History:      Past Surgical History:   Procedure Laterality Date     SECTION      x2       Physical Exam:  Vitals:    24 1118   BP: 133/86   Pulse: 67   Resp: 18     Physical Exam   Constitutional: She is oriented to person, place, and time. She appears not cachectic. No distress. She is obese.   HENT:   Head: Normocephalic.   Right Ear: External ear normal.   Left Ear: External ear normal.   Nose: Nose normal. No mucosal edema. No polyps.   Mouth/Throat: Oropharynx is clear and moist. Normal dentition. No oropharyngeal exudate.   Neck: No JVD present. No tracheal deviation present. No thyromegaly present.   Cardiovascular: Normal rate, regular rhythm, normal heart sounds and intact  distal pulses. Exam reveals no gallop and no friction rub.   No murmur heard.  Pulmonary/Chest: Normal expansion, symmetric chest wall expansion, effort normal and breath sounds normal. No stridor. No respiratory distress. She has no decreased breath sounds. She has no wheezes. She has no rhonchi. She has no rales. Chest wall is not dull to percussion. She exhibits no tenderness. Negative for egophony. Negative for tactile fremitus.   Abdominal: Soft. Bowel sounds are normal. She exhibits no distension and no mass. There is no hepatosplenomegaly. There is no abdominal tenderness. There is no rebound and no guarding. No hernia.   Musculoskeletal:         General: No tenderness, deformity or edema. Normal range of motion.      Cervical back: Normal range of motion and neck supple.   Lymphadenopathy: No supraclavicular adenopathy is present.     She has no cervical adenopathy.     She has no axillary adenopathy.   Neurological: She is alert and oriented to person, place, and time. She has normal reflexes. She displays normal reflexes. No cranial nerve deficit. She exhibits normal muscle tone.   Skin: Skin is warm and dry. No rash noted. She is not diaphoretic. No cyanosis or erythema. No pallor. Nails show no clubbing.   Psychiatric: She has a normal mood and affect. Her behavior is normal. Judgment and thought content normal.            Accessory Clinical Data:  Chest x-ray:    CT scan:     PFTs:     6MWT:     TTE:    Lab data:    All radiographic imaging of the chest, PFT tracings/data, and 6MWT data have been independently reviewed and interpreted unless otherwise specified.     Assessment and Plan:        Problem List Items Addressed This Visit          Pulmonary    Asthma - Primary     Other Visit Diagnoses       ORVILLE on CPAP        Obesity (BMI 30.0-34.9)               Asthma control is excellent on Dupixent and Trelegy.  Has not been using her inhalers or nebulizers frequently.  Had used her nebulizer only for a  week when she got sick after attending a wedding few months ago.  Exercise capacity much improved.  Weight loss has significantly helped with her overall health.  She should take her Dupixent injections as well as her CPAP with her when she travels out of [place].  Given stability of her disease, we will increase the duration intervals of follow-up.          Follow up in about 6 months (around 10/1/2024).     This note is dictated on M*Modal word recognition program.  There are word recognition mistakes that are occasionally missed on review.

## 2024-04-07 RX ORDER — CLOBETASOL PROPIONATE 0.5 MG/G
1 CREAM TOPICAL 2 TIMES DAILY
Qty: 60 G | Refills: 0 | Status: SHIPPED | OUTPATIENT
Start: 2024-04-07 | End: 2024-04-17

## 2024-04-17 ENCOUNTER — TELEPHONE (OUTPATIENT)
Dept: PRIMARY CARE CLINIC | Facility: CLINIC | Age: 59
End: 2024-04-17
Payer: COMMERCIAL

## 2024-04-17 NOTE — TELEPHONE ENCOUNTER
PT'S INS APPROVED HER REQUEST FOR MOUNJARO AND PT ALSO USES A DISCOUNT CARD--PT HAS A DEDUCTIBLE OF $3,056.00 TO MEET -- HER COST OF THE MOUNJARO IS $733.00

## 2024-05-02 DIAGNOSIS — E11.9 TYPE 2 DIABETES MELLITUS WITHOUT COMPLICATION, WITHOUT LONG-TERM CURRENT USE OF INSULIN: ICD-10-CM

## 2024-05-03 RX ORDER — TIRZEPATIDE 7.5 MG/.5ML
INJECTION, SOLUTION SUBCUTANEOUS
Qty: 4 PEN | Refills: 1 | Status: SHIPPED | OUTPATIENT
Start: 2024-05-03

## 2024-06-26 DIAGNOSIS — E11.9 TYPE 2 DIABETES MELLITUS WITHOUT COMPLICATION, WITHOUT LONG-TERM CURRENT USE OF INSULIN: ICD-10-CM

## 2024-06-26 DIAGNOSIS — E03.9 HYPOTHYROIDISM, UNSPECIFIED TYPE: ICD-10-CM

## 2024-06-26 RX ORDER — CLOBETASOL PROPIONATE 0.5 MG/G
CREAM TOPICAL 2 TIMES DAILY
Qty: 60 G | Refills: 0 | Status: SHIPPED | OUTPATIENT
Start: 2024-06-26

## 2024-06-26 RX ORDER — TIRZEPATIDE 5 MG/.5ML
INJECTION, SOLUTION SUBCUTANEOUS
Qty: 4 PEN | Refills: 3 | OUTPATIENT
Start: 2024-06-26

## 2024-06-26 RX ORDER — LEVOTHYROXINE SODIUM 112 UG/1
112 TABLET ORAL
Qty: 90 TABLET | Refills: 2 | Status: SHIPPED | OUTPATIENT
Start: 2024-06-26

## 2024-07-18 ENCOUNTER — OFFICE VISIT (OUTPATIENT)
Dept: OBSTETRICS AND GYNECOLOGY | Facility: CLINIC | Age: 59
End: 2024-07-18
Payer: COMMERCIAL

## 2024-07-18 VITALS
HEIGHT: 60 IN | HEART RATE: 67 BPM | BODY MASS INDEX: 33.5 KG/M2 | DIASTOLIC BLOOD PRESSURE: 85 MMHG | WEIGHT: 170.63 LBS | SYSTOLIC BLOOD PRESSURE: 119 MMHG

## 2024-07-18 DIAGNOSIS — E78.5 HYPERLIPIDEMIA, UNSPECIFIED HYPERLIPIDEMIA TYPE: ICD-10-CM

## 2024-07-18 DIAGNOSIS — E11.9 TYPE 2 DIABETES MELLITUS WITHOUT COMPLICATION, WITHOUT LONG-TERM CURRENT USE OF INSULIN: ICD-10-CM

## 2024-07-18 DIAGNOSIS — J45.50 SEVERE PERSISTENT ASTHMA, UNSPECIFIED WHETHER COMPLICATED: ICD-10-CM

## 2024-07-18 DIAGNOSIS — Z12.31 SCREENING MAMMOGRAM FOR BREAST CANCER: Primary | ICD-10-CM

## 2024-07-18 DIAGNOSIS — Z00.00 ENCOUNTER FOR WELLNESS EXAMINATION: ICD-10-CM

## 2024-07-18 DIAGNOSIS — N95.2 VAGINAL ATROPHY: ICD-10-CM

## 2024-07-18 DIAGNOSIS — B37.31 VAGINAL CANDIDA: ICD-10-CM

## 2024-07-18 PROCEDURE — 99396 PREV VISIT EST AGE 40-64: CPT | Mod: S$GLB,,, | Performed by: OBSTETRICS & GYNECOLOGY

## 2024-07-18 PROCEDURE — 4010F ACE/ARB THERAPY RXD/TAKEN: CPT | Mod: CPTII,S$GLB,, | Performed by: OBSTETRICS & GYNECOLOGY

## 2024-07-18 PROCEDURE — 3079F DIAST BP 80-89 MM HG: CPT | Mod: CPTII,S$GLB,, | Performed by: OBSTETRICS & GYNECOLOGY

## 2024-07-18 PROCEDURE — 3061F NEG MICROALBUMINURIA REV: CPT | Mod: CPTII,S$GLB,, | Performed by: OBSTETRICS & GYNECOLOGY

## 2024-07-18 PROCEDURE — 3008F BODY MASS INDEX DOCD: CPT | Mod: CPTII,S$GLB,, | Performed by: OBSTETRICS & GYNECOLOGY

## 2024-07-18 PROCEDURE — 99459 PELVIC EXAMINATION: CPT | Mod: S$GLB,,, | Performed by: OBSTETRICS & GYNECOLOGY

## 2024-07-18 PROCEDURE — 3066F NEPHROPATHY DOC TX: CPT | Mod: CPTII,S$GLB,, | Performed by: OBSTETRICS & GYNECOLOGY

## 2024-07-18 PROCEDURE — 3044F HG A1C LEVEL LT 7.0%: CPT | Mod: CPTII,S$GLB,, | Performed by: OBSTETRICS & GYNECOLOGY

## 2024-07-18 PROCEDURE — 1159F MED LIST DOCD IN RCRD: CPT | Mod: CPTII,S$GLB,, | Performed by: OBSTETRICS & GYNECOLOGY

## 2024-07-18 PROCEDURE — 3074F SYST BP LT 130 MM HG: CPT | Mod: CPTII,S$GLB,, | Performed by: OBSTETRICS & GYNECOLOGY

## 2024-07-18 RX ORDER — CLOTRIMAZOLE AND BETAMETHASONE DIPROPIONATE 10; .64 MG/G; MG/G
CREAM TOPICAL 2 TIMES DAILY
Qty: 45 G | Refills: 1 | Status: SHIPPED | OUTPATIENT
Start: 2024-07-18 | End: 2024-08-01

## 2024-07-18 RX ORDER — MUPIROCIN 20 MG/G
OINTMENT TOPICAL 3 TIMES DAILY
COMMUNITY
Start: 2024-02-22

## 2024-07-18 RX ORDER — ATORVASTATIN CALCIUM 20 MG/1
TABLET, FILM COATED ORAL
Qty: 90 TABLET | Refills: 3 | Status: SHIPPED | OUTPATIENT
Start: 2024-07-18

## 2024-07-18 RX ORDER — FLUCONAZOLE 150 MG/1
150 TABLET ORAL EVERY OTHER DAY
Qty: 2 TABLET | Refills: 2 | Status: SHIPPED | OUTPATIENT
Start: 2024-07-18 | End: 2024-07-21

## 2024-07-18 RX ORDER — TIRZEPATIDE 7.5 MG/.5ML
INJECTION, SOLUTION SUBCUTANEOUS
Qty: 4 PEN | Refills: 1 | Status: SHIPPED | OUTPATIENT
Start: 2024-07-18

## 2024-07-18 RX ORDER — FLUTICASONE FUROATE, UMECLIDINIUM BROMIDE AND VILANTEROL TRIFENATATE 200; 62.5; 25 UG/1; UG/1; UG/1
1 POWDER RESPIRATORY (INHALATION)
COMMUNITY
Start: 2024-06-26

## 2024-07-18 RX ORDER — ESTRADIOL 10 UG/1
10 INSERT VAGINAL
Qty: 8 EACH | Refills: 12 | Status: SHIPPED | OUTPATIENT
Start: 2024-07-18

## 2024-07-18 RX ORDER — ALBUTEROL SULFATE 90 UG/1
2 AEROSOL, METERED RESPIRATORY (INHALATION) EVERY 4 HOURS PRN
Qty: 18 G | Refills: 3 | Status: SHIPPED | OUTPATIENT
Start: 2024-07-18

## 2024-07-18 NOTE — PROGRESS NOTES
Subjective     Patient ID: Adri Thomason is a 59 y.o. female.    Chief Complaint:  Well Woman      History of Present Illness  HPI  This is a 59 year old female coming in for her annual exam. She also has complaints of vaginal yeast infection and pain.  Also vaginal dryness and pain with intercourse     GYN & OB History  No LMP recorded. Patient is postmenopausal.   Date of Last Pap: 10/17/2022    OB History    Para Term  AB Living   2 2 2     2   SAB IAB Ectopic Multiple Live Births           2      # Outcome Date GA Lbr Donny/2nd Weight Sex Type Anes PTL Lv   2 Term     M CS-LTranv   ANGEL   1 Term     M CS-LTranv   ANGEL       Review of Systems  Review of Systems   Constitutional:  Negative for fatigue, fever and unexpected weight change.   Respiratory:  Negative for cough and shortness of breath.    Cardiovascular:  Negative for chest pain, palpitations and leg swelling.   Gastrointestinal:  Negative for abdominal pain, bloating, blood in stool, constipation, diarrhea, nausea and vomiting.   Genitourinary:  Positive for dyspareunia, vaginal discharge and vaginal dryness. Negative for decreased libido, dysmenorrhea, dysuria, frequency, genital sores, hematuria, menorrhagia, menstrual problem, pelvic pain, urgency, urinary incontinence and vaginal odor.   Musculoskeletal:  Negative for arthralgias and joint swelling.   Integumentary:  Negative for rash, mole/lesion, breast mass, nipple discharge, breast skin changes and breast tenderness.   Psychiatric/Behavioral: Negative.     Breast: Negative for asymmetry, lump, mass, nipple discharge, skin changes and tenderness         Objective   Physical Exam:   Constitutional: She appears well-developed and well-nourished.      Neck: No thyroid mass and no thyromegaly present.     Pulmonary/Chest: Chest wall is not dull to percussion. She exhibits no mass, no tenderness, no bony tenderness, no laceration, no crepitus, no edema, no deformity, no swelling and  no retraction. Right breast exhibits no inverted nipple, no mass, no nipple discharge, no skin change, no tenderness, presence, no bleeding and no swelling. Left breast exhibits no inverted nipple, no mass, no nipple discharge, no skin change, no tenderness, presence, no bleeding and no swelling.        Abdominal: Soft. Bowel sounds are normal. There is no abdominal tenderness. Hernia confirmed negative in the right inguinal area and confirmed negative in the left inguinal area.     Genitourinary:    Uterus normal.      Pelvic exam was performed with patient supine.     Labial bartholins normal.There is no rash, tenderness, lesion or injury on the right labia. There is no rash, tenderness, lesion or injury on the left labia. Cervix is normal. Right adnexum displays no mass, no tenderness and no fullness. Left adnexum displays no mass, no tenderness and no fullness. There is erythema and vaginal discharge in the vagina. No rectocele, cystocele or prolapse of vaginal walls in the vagina. Vaginal atrophy noted.                Skin: Skin is warm and dry.    Psychiatric: She has a normal mood and affect. Her speech is normal and behavior is normal.       Chaperone present        Assessment and Plan     1. Screening mammogram for breast cancer    2. Encounter for wellness examination    3. Vaginal atrophy    4. Vaginal candida            Plan:  Screening mammogram for breast cancer  -     Mammo Digital Screening Bilat; Future; Expected date: 07/18/2024    Encounter for wellness examination    Vaginal atrophy  -     estradioL (IMVEXXY MAINTENANCE PACK) 10 mcg Inst; Place 10 mcg vaginally twice a week.  Dispense: 8 each; Refill: 12    Vaginal candida  -     fluconazole (DIFLUCAN) 150 MG Tab; Take 1 tablet (150 mg total) by mouth every other day. for 2 doses  Dispense: 2 tablet; Refill: 2  -     clotrimazole-betamethasone 1-0.05% (LOTRISONE) cream; Apply topically 2 (two) times daily. for 14 days  Dispense: 45 g; Refill:  1

## 2024-11-04 RX ORDER — CLOBETASOL PROPIONATE 0.5 MG/G
CREAM TOPICAL 2 TIMES DAILY
Qty: 60 G | Refills: 0 | Status: SHIPPED | OUTPATIENT
Start: 2024-11-04 | End: 2024-11-06

## 2024-11-06 RX ORDER — CLOBETASOL PROPIONATE 0.5 MG/G
CREAM TOPICAL 2 TIMES DAILY
Qty: 60 G | Refills: 0 | Status: SHIPPED | OUTPATIENT
Start: 2024-11-06

## 2024-11-13 ENCOUNTER — OFFICE VISIT (OUTPATIENT)
Dept: PULMONOLOGY | Facility: CLINIC | Age: 59
End: 2024-11-13
Payer: COMMERCIAL

## 2024-11-13 VITALS — BODY MASS INDEX: 32.71 KG/M2 | HEIGHT: 60 IN | WEIGHT: 166.63 LBS | RESPIRATION RATE: 18 BRPM

## 2024-11-13 DIAGNOSIS — J45.50 SEVERE PERSISTENT ASTHMA WITH ALLERGIC RHINITIS, UNSPECIFIED WHETHER COMPLICATED: Primary | ICD-10-CM

## 2024-11-13 DIAGNOSIS — E66.811 OBESITY (BMI 30.0-34.9): ICD-10-CM

## 2024-11-13 DIAGNOSIS — G47.33 OSA ON CPAP: Primary | ICD-10-CM

## 2024-11-13 DIAGNOSIS — J45.909 ASTHMA, UNSPECIFIED ASTHMA SEVERITY, UNSPECIFIED WHETHER COMPLICATED, UNSPECIFIED WHETHER PERSISTENT: ICD-10-CM

## 2024-11-13 PROCEDURE — 99213 OFFICE O/P EST LOW 20 MIN: CPT | Mod: ,,, | Performed by: PHYSICIAN ASSISTANT

## 2024-11-13 PROCEDURE — 1160F RVW MEDS BY RX/DR IN RCRD: CPT | Mod: CPTII,,, | Performed by: PHYSICIAN ASSISTANT

## 2024-11-13 PROCEDURE — 1159F MED LIST DOCD IN RCRD: CPT | Mod: CPTII,,, | Performed by: PHYSICIAN ASSISTANT

## 2024-11-13 PROCEDURE — 4010F ACE/ARB THERAPY RXD/TAKEN: CPT | Mod: CPTII,,, | Performed by: PHYSICIAN ASSISTANT

## 2024-11-13 PROCEDURE — 3008F BODY MASS INDEX DOCD: CPT | Mod: CPTII,,, | Performed by: PHYSICIAN ASSISTANT

## 2024-11-13 PROCEDURE — 3061F NEG MICROALBUMINURIA REV: CPT | Mod: CPTII,,, | Performed by: PHYSICIAN ASSISTANT

## 2024-11-13 PROCEDURE — 3066F NEPHROPATHY DOC TX: CPT | Mod: CPTII,,, | Performed by: PHYSICIAN ASSISTANT

## 2024-11-13 PROCEDURE — 3044F HG A1C LEVEL LT 7.0%: CPT | Mod: CPTII,,, | Performed by: PHYSICIAN ASSISTANT

## 2024-11-13 NOTE — PROGRESS NOTES
"                                         Pulmonary Outpatient Follow Up Visit     Subjective:       Patient ID: Adri Thomason is a 59 y.o. female.    Chief Complaint: Asthma      Patient returns today for 6 month f/u for asthma and ORVILLE. Pt states she is doing well with trelegy daily and dupixent q2 weeks. Patient [place] States while she was there, she developed flu/covid like symptoms. States she tested positive for COVID but "does trust the tests over there." Reports being sick with low grade 99 fever, cough and wheezing x 3 weeks. Was using her duoneb daily, and tylenol and short course of prednisone; denies being hospitalized. Since returning home, she feels 100% better, denies lingering SOB, wheezing or fatigue/headaches or fevers. Pt believes there were some environmental triggers as well that may have flared up her asthma.  She is back to only using trelegy daily and dupixent q2 weeks. Patient states she has not needed her albuterol inhaler or duoneb.   Patient also being seen today for ORVILLE follow up/compliance. Patient uses [employer]. States while she was overseas, she was not able to bring her machine with her. She has only been back on it for the last 3 weeks. Patient will send us compliance report in December when she has 2 months worth of data to review. Pt overall reports no complaints with her sleep apnea today. She is about to reorder her new supplies today.     Asthma  There is no cough, hemoptysis, shortness of breath or wheezing. Pertinent negatives include no appetite change, chest pain, ear pain, fever, headaches, myalgias, postnasal drip, rhinorrhea, sore throat or trouble swallowing. Her past medical history is significant for asthma.             Review of Systems   Constitutional:  Negative for fever, chills, appetite change, night sweats and weakness.   HENT:  Negative for postnasal drip, rhinorrhea, sore throat, trouble swallowing, voice change, congestion " and ear pain.    Respiratory:  Negative for cough, hemoptysis, chest tightness, shortness of breath, wheezing, dyspnea on extertion and use of rescue inhaler.    Cardiovascular:  Negative for chest pain, palpitations and leg swelling.   Genitourinary:  Negative for difficulty urinating and hematuria.   Endocrine:  Negative for polydipsia, polyphagia, cold intolerance, heat intolerance and polyuria.    Musculoskeletal:  Negative for joint swelling and myalgias.   Skin:  Negative for rash.   Gastrointestinal:  Negative for nausea, vomiting, abdominal pain and abdominal distention.   Neurological:  Negative for dizziness, syncope, light-headedness and headaches.   Psychiatric/Behavioral:  Negative for confusion and sleep disturbance. The patient is not nervous/anxious.        Outpatient Encounter Medications as of 11/13/2024   Medication Sig Dispense Refill    albuterol (PROVENTIL/VENTOLIN HFA) 90 mcg/actuation inhaler INHALE 2 PUFFS INTO THE LUNGS EVERY 4 (FOUR) HOURS AS NEEDED. RESCUE 18 g 3    albuterol-ipratropium (DUO-NEB) 2.5 mg-0.5 mg/3 mL nebulizer solution INHALE 1 VIAL VIA NEBULIZER 4 TIMES A DAY AS NEEDED 90 mL 2    atorvastatin (LIPITOR) 20 MG tablet TAKE 1 TABLET BY MOUTH EVERY DAY 90 tablet 3    calcium carbonate-vit D3-min 600 mg calcium- 400 unit Tab Take 1 tablet by mouth once daily. 100 tablet 3    clindamycin phosphate 1% (CLINDAGEL) 1 % gel Apply topically 2 (two) times daily. 1 each 1    clobetasoL (TEMOVATE) 0.05 % cream APPLY TOPICALLY 2 (TWO) TIMES DAILY FOR 10 DAYS 60 g 0    dupilumab (DUPIXENT PEN) 300 mg/2 mL PnIj INJECT 1 PEN (300 MG) UNDER THE SKIN EVERY 2 WEEKS 4 mL 11    EPINEPHrine (EPIPEN) 0.3 mg/0.3 mL AtIn USE 1 INJECTION AS DIRECTED AS NEEDED FOR EMERGENCY USE ONLY 2 each 1    estradioL (IMVEXXY MAINTENANCE PACK) 10 mcg Inst Place 10 mcg vaginally twice a week. 8 each 12    fluticasone propionate (FLONASE) 50 mcg/actuation nasal spray 2 sprays by Each Nare route every evening.       ketoconazole (NIZORAL) 2 % cream Apply topically once daily. 30 g 1    levothyroxine (SYNTHROID) 112 MCG tablet TAKE 1 TABLET BY MOUTH BEFORE BREAKFAST. 90 tablet 2    losartan (COZAAR) 50 MG tablet TAKE 1 TABLET BY MOUTH EVERY DAY 90 tablet 3    montelukast (SINGULAIR) 10 mg tablet TAKE 1 TABLET BY MOUTH EVERY DAY IN THE EVENING 90 tablet 3    mupirocin (BACTROBAN) 2 % ointment 3 (three) times daily. Apply to affected area      tirzepatide (MOUNJARO) 7.5 mg/0.5 mL PnIj INJECT 7.5 MG SUBCUTANEOUSLY EVERY 7 DAYS FOR 28 DAYS 4 Pen 1    TRELEGY ELLIPTA 200-62.5-25 mcg inhaler Inhale 1 puff into the lungs.       No facility-administered encounter medications on file as of 11/13/2024.       Objective:     Vital Signs (Most Recent)  Vital Signs  Resp: 18  Height and Weight  Height: 5' (152.4 cm)  Weight: 75.6 kg (166 lb 9.6 oz)  BSA (Calculated - sq m): 1.79 sq meters  BMI (Calculated): 32.5  Weight in (lb) to have BMI = 25: 127.7]  Wt Readings from Last 2 Encounters:   11/13/24 75.6 kg (166 lb 9.6 oz)   07/18/24 77.4 kg (170 lb 9.6 oz)       Physical Exam   Constitutional: She is oriented to person, place, and time. She appears well-developed and well-nourished. She appears not cachectic. No distress. She is obese.   HENT:   Head: Normocephalic.   Right Ear: External ear normal.   Left Ear: External ear normal.   Nose: Nose normal. No mucosal edema. No polyps.   Mouth/Throat: Oropharynx is clear and moist. Normal dentition. No oropharyngeal exudate. Mallampati Score: III.   Neck: No JVD present. No tracheal deviation present. No thyromegaly present.   Cardiovascular: Normal rate, regular rhythm, normal heart sounds and intact distal pulses. Exam reveals no gallop and no friction rub.   No murmur heard.  Pulmonary/Chest: Normal expansion, symmetric chest wall expansion, effort normal and breath sounds normal. No stridor. No respiratory distress. She has no decreased breath sounds. She has no wheezes. She has no rhonchi.  She has no rales. Chest wall is not dull to percussion. She exhibits no tenderness. Negative for egophony. Negative for tactile fremitus.   Abdominal: Soft. Bowel sounds are normal. She exhibits no distension and no mass. There is no hepatosplenomegaly. There is no abdominal tenderness. There is no rebound and no guarding. No hernia.   Musculoskeletal:         General: No tenderness, deformity or edema. Normal range of motion.      Cervical back: Normal range of motion and neck supple.   Lymphadenopathy: No supraclavicular adenopathy is present.     She has no cervical adenopathy.     She has no axillary adenopathy.   Neurological: She is alert and oriented to person, place, and time.   Skin: Skin is warm and dry. No rash noted. She is not diaphoretic. No cyanosis or erythema. No pallor. Nails show no clubbing.   Psychiatric: She has a normal mood and affect. Her behavior is normal. Judgment and thought content normal.       Laboratory  Lab Results   Component Value Date    WBC 7.5 02/23/2024    RBC 4.86 08/15/2022    HGB 13.5 02/23/2024    HCT 42.7 02/23/2024    MCV 89.1 02/23/2024    MCH 24.9 (L) 08/15/2022    MCHC 31.2 (L) 08/15/2022    RDW 15.6 (H) 08/15/2022     08/15/2022    MPV 10.6 02/23/2024    LYMPH 2,521 08/15/2022    LYMPH 27.4 08/15/2022    MONO 690 08/15/2022    MONO 7.5 08/15/2022     08/15/2022    BASO 37 08/15/2022    EOSINOPHIL 5.2 08/15/2022    BASOPHIL 0.4 08/15/2022       BMP  Lab Results   Component Value Date     02/23/2024    K 4.2 02/23/2024     (H) 02/23/2024    CO2 28 02/23/2024    BUN 18.0 02/23/2024    CREATININE 0.70 02/23/2024    CALCIUM 9.0 02/23/2024    ANIONGAP 5.0 02/23/2024    ESTGFRAFRICA 118 03/08/2022    EGFRNONAA 102 03/08/2022    AST 15 02/23/2024    ALT 17 02/23/2024    PROT 6.9 02/23/2024       Lab Results   Component Value Date    TSH 2.300 02/23/2024     Lab Results   Component Value Date    IGE 89 08/11/2022              Assessment/Plan:    Asthma, well controlled, unspecified asthma severity, unspecified whether persistent    ORVILLE on CPAP    Obesity (BMI 30.0-34.9)    Asthma, unspecified asthma severity, unspecified whether complicated, unspecified whether persistent      PLAN:  Patient has good control of her asthma with trelegy daily and dupixent   State she still has 2 pens left of dupixent but we will send her with samples today due to change in insurance   We will also give her 2 samples of trelegy today  She has recovered well from COVID illness a few months ago. Has no lingering SOB, wheezing or cough.  No longer requires rescue inhaler or duonebs daily.   Patient will need to change pharmacy for dupixent to [facility] in [place].    There is no compliance report to review today as patient did not use it while out of the country for the last few months. Plan to review new compliance report from [employer] when patient sends it over in December. Reports no complaints at this time.    Follow up in about 6 months (around 5/13/2025) for asthma follow up (good control), review of [employer] compliance report .    Discussed diagnosis, its evaluation, treatment and usual course. All questions answered.      Steph Casey PA-C

## 2024-11-14 RX ORDER — DUPILUMAB 300 MG/2ML
300 INJECTION, SOLUTION SUBCUTANEOUS
Qty: 4 ML | Refills: 0 | Status: SHIPPED | OUTPATIENT
Start: 2024-11-14 | End: 2024-12-14

## 2024-11-18 ENCOUNTER — TELEPHONE (OUTPATIENT)
Dept: OBSTETRICS AND GYNECOLOGY | Facility: CLINIC | Age: 59
End: 2024-11-18
Payer: COMMERCIAL

## 2024-11-18 DIAGNOSIS — N64.89 OTHER SPECIFIED DISORDERS OF BREAST: Primary | ICD-10-CM

## 2024-11-18 NOTE — TELEPHONE ENCOUNTER
Pt returned office call and is aware of her results. Will call to schedule diagnostic mammogram.   Arianna Quiroz

## 2024-11-18 NOTE — TELEPHONE ENCOUNTER
----- Message from Mallory Blakely MD sent at 11/18/2024  3:12 PM CST -----  order  ----- Message -----  From: Interface, Incoming Rad Results  Sent: 11/18/2024   2:50 PM CST  To: Mallory Blakely MD

## 2024-11-18 NOTE — TELEPHONE ENCOUNTER
Called to notify patient of her mammogram results being incomplete and recommendation of additional images. No answer. Left message on VM to return call. Will send message through the portal.   Arianna Quiroz

## 2024-11-21 ENCOUNTER — PATIENT MESSAGE (OUTPATIENT)
Dept: ADMINISTRATIVE | Facility: HOSPITAL | Age: 59
End: 2024-11-21
Payer: COMMERCIAL

## 2024-11-23 DIAGNOSIS — E11.9 TYPE 2 DIABETES MELLITUS WITHOUT COMPLICATION, WITHOUT LONG-TERM CURRENT USE OF INSULIN: ICD-10-CM

## 2024-11-25 RX ORDER — TIRZEPATIDE 7.5 MG/.5ML
INJECTION, SOLUTION SUBCUTANEOUS
Qty: 6 ML | Refills: 1 | Status: SHIPPED | OUTPATIENT
Start: 2024-11-25

## 2024-12-03 ENCOUNTER — PATIENT MESSAGE (OUTPATIENT)
Dept: OBSTETRICS AND GYNECOLOGY | Facility: CLINIC | Age: 59
End: 2024-12-03
Payer: COMMERCIAL

## 2024-12-03 LAB — BCS RECOMMENDATION EXT: NORMAL

## 2024-12-05 ENCOUNTER — TELEPHONE (OUTPATIENT)
Dept: PRIMARY CARE CLINIC | Facility: CLINIC | Age: 59
End: 2024-12-05
Payer: COMMERCIAL

## 2024-12-05 DIAGNOSIS — J45.909 ASTHMA, UNSPECIFIED ASTHMA SEVERITY, UNSPECIFIED WHETHER COMPLICATED, UNSPECIFIED WHETHER PERSISTENT: ICD-10-CM

## 2024-12-05 RX ORDER — EPINEPHRINE 0.3 MG/.3ML
INJECTION SUBCUTANEOUS
Qty: 2 EACH | Refills: 1 | Status: SHIPPED | OUTPATIENT
Start: 2024-12-05

## 2024-12-05 NOTE — TELEPHONE ENCOUNTER
PA HAS BEEN COMPLETED FOR HER REQUEST OF LINDSAY AND SENT TO HER INS --NOW WAITING ON A REPLY WHICH CAN TAKE UP TO 72 HRS

## 2024-12-09 ENCOUNTER — OFFICE VISIT (OUTPATIENT)
Dept: PRIMARY CARE CLINIC | Facility: CLINIC | Age: 59
End: 2024-12-09
Payer: COMMERCIAL

## 2024-12-09 VITALS
BODY MASS INDEX: 32.32 KG/M2 | HEART RATE: 89 BPM | OXYGEN SATURATION: 96 % | HEIGHT: 60 IN | DIASTOLIC BLOOD PRESSURE: 82 MMHG | RESPIRATION RATE: 16 BRPM | TEMPERATURE: 98 F | WEIGHT: 164.63 LBS | SYSTOLIC BLOOD PRESSURE: 113 MMHG

## 2024-12-09 DIAGNOSIS — J06.9 UPPER RESPIRATORY TRACT INFECTION, UNSPECIFIED TYPE: ICD-10-CM

## 2024-12-09 DIAGNOSIS — E11.9 TYPE 2 DIABETES MELLITUS WITHOUT COMPLICATION, WITHOUT LONG-TERM CURRENT USE OF INSULIN: Primary | ICD-10-CM

## 2024-12-09 PROCEDURE — 3074F SYST BP LT 130 MM HG: CPT | Mod: CPTII,,, | Performed by: INTERNAL MEDICINE

## 2024-12-09 PROCEDURE — 3061F NEG MICROALBUMINURIA REV: CPT | Mod: CPTII,,, | Performed by: INTERNAL MEDICINE

## 2024-12-09 PROCEDURE — 4010F ACE/ARB THERAPY RXD/TAKEN: CPT | Mod: CPTII,,, | Performed by: INTERNAL MEDICINE

## 2024-12-09 PROCEDURE — 3008F BODY MASS INDEX DOCD: CPT | Mod: CPTII,,, | Performed by: INTERNAL MEDICINE

## 2024-12-09 PROCEDURE — 1160F RVW MEDS BY RX/DR IN RCRD: CPT | Mod: CPTII,,, | Performed by: INTERNAL MEDICINE

## 2024-12-09 PROCEDURE — 1159F MED LIST DOCD IN RCRD: CPT | Mod: CPTII,,, | Performed by: INTERNAL MEDICINE

## 2024-12-09 PROCEDURE — 3044F HG A1C LEVEL LT 7.0%: CPT | Mod: CPTII,,, | Performed by: INTERNAL MEDICINE

## 2024-12-09 PROCEDURE — 3066F NEPHROPATHY DOC TX: CPT | Mod: CPTII,,, | Performed by: INTERNAL MEDICINE

## 2024-12-09 PROCEDURE — 3079F DIAST BP 80-89 MM HG: CPT | Mod: CPTII,,, | Performed by: INTERNAL MEDICINE

## 2024-12-09 PROCEDURE — 99214 OFFICE O/P EST MOD 30 MIN: CPT | Mod: S$PBB,,, | Performed by: INTERNAL MEDICINE

## 2024-12-09 RX ORDER — AZITHROMYCIN 250 MG/1
TABLET, FILM COATED ORAL
Qty: 6 TABLET | Refills: 0 | Status: SHIPPED | OUTPATIENT
Start: 2024-12-09 | End: 2024-12-14

## 2024-12-09 RX ORDER — PREDNISONE 20 MG/1
20 TABLET ORAL DAILY
Qty: 7 TABLET | Refills: 0 | Status: SHIPPED | OUTPATIENT
Start: 2024-12-09

## 2024-12-09 NOTE — PROGRESS NOTES
Subjective:      Patient ID: Adri Thomason is a 59 y.o. female.    Chief Complaint: Cough (Presents to clinic with cough ,cold, congestion 4 days prior ,  Z-Pack )    :  Diabetes with last A1c of 5.5 is at goal.  Patient is on Mounjaro and tolerating the medication well.  Patient reports home blood sugars are under good control.     Patient presented today with complains of cough x 4 days.  Cough is nonproductive, no wheezing or shortness on breath.  Patient reports some chest tightness.  Patient denies any tightness on movement. Patient also reports nasal congestion, and postnasal drip, no change of taste or smell    Review of Systems   Constitutional:  Negative for chills, diaphoresis, fever, malaise/fatigue and weight loss.   HENT:  Negative for congestion, ear pain, sinus pain, sore throat and tinnitus.    Eyes:  Negative for blurred vision and photophobia.   Respiratory:  Positive for cough. Negative for hemoptysis, shortness of breath and wheezing.    Cardiovascular:  Negative for chest pain, palpitations, orthopnea, leg swelling and PND.   Gastrointestinal:  Negative for abdominal pain, blood in stool, constipation, diarrhea, heartburn, melena, nausea and vomiting.   Genitourinary:  Negative for dysuria, frequency and urgency.   Musculoskeletal:  Negative for back pain, myalgias and neck pain.   Skin:  Negative for rash.   Neurological:  Negative for dizziness, tremors, seizures, loss of consciousness and weakness.   Endo/Heme/Allergies:  Negative for polydipsia.   Psychiatric/Behavioral:  Negative for depression and hallucinations. The patient does not have insomnia.      Objective:   /82 (BP Location: Left arm, Patient Position: Sitting)   Pulse 89   Temp 98 °F (36.7 °C) (Oral)   Resp 16   Ht 5' (1.524 m)   Wt 74.7 kg (164 lb 9.6 oz)   SpO2 96%   BMI 32.15 kg/m²     Physical Exam  Constitutional:       General: She is not in acute distress.     Appearance: She is not diaphoretic.   Neck:       Thyroid: No thyromegaly.   Cardiovascular:      Rate and Rhythm: Normal rate and regular rhythm.      Heart sounds: Normal heart sounds. No murmur heard.  Pulmonary:      Effort: Pulmonary effort is normal. No respiratory distress.      Breath sounds: Normal breath sounds. No wheezing.   Abdominal:      General: Bowel sounds are normal. There is no distension.      Palpations: Abdomen is soft.      Tenderness: There is no abdominal tenderness.   Lymphadenopathy:      Cervical: No cervical adenopathy.   Neurological:      Mental Status: She is alert and oriented to person, place, and time.   Psychiatric:         Behavior: Behavior normal.         Thought Content: Thought content normal.         Judgment: Judgment normal.       Assessment:       ICD-10-CM ICD-9-CM   1. Type 2 diabetes mellitus without complication, without long-term current use of insulin  E11.9 250.00   2. Upper respiratory tract infection, unspecified type  J06.9 465.9       Plan:   Patient With diabetes with last A1c of 5.5 is at goal  Repeat labs are recommended but deferred by patient at this time  Patient has acute asthma exacerbation  Patient is given nebulizer treatment with much help with the symptoms  Patient is given Celestone injection  Will prescribe prednisone for 7 days  Advised patient to use nebulizer treatment frequently  Will also use Z-An.    Medication List with Changes/Refills   New Medications    AZITHROMYCIN (Z-AN) 250 MG TABLET    Take 2 tablets by mouth on day 1; Take 1 tablet by mouth on days 2-5    PREDNISONE (DELTASONE) 20 MG TABLET    Take 1 tablet (20 mg total) by mouth once daily.   Current Medications    ALBUTEROL (PROVENTIL/VENTOLIN HFA) 90 MCG/ACTUATION INHALER    INHALE 2 PUFFS INTO THE LUNGS EVERY 4 (FOUR) HOURS AS NEEDED. RESCUE    ALBUTEROL-IPRATROPIUM (DUO-NEB) 2.5 MG-0.5 MG/3 ML NEBULIZER SOLUTION    INHALE 1 VIAL VIA NEBULIZER 4 TIMES A DAY AS NEEDED    ATORVASTATIN (LIPITOR) 20 MG TABLET    TAKE 1 TABLET BY  MOUTH EVERY DAY    CALCIUM CARBONATE-VIT D3- MG CALCIUM- 400 UNIT TAB    Take 1 tablet by mouth once daily.    CLINDAMYCIN PHOSPHATE 1% (CLINDAGEL) 1 % GEL    Apply topically 2 (two) times daily.    CLOBETASOL (TEMOVATE) 0.05 % CREAM    APPLY TOPICALLY 2 (TWO) TIMES DAILY FOR 10 DAYS    DUPILUMAB (DUPIXENT PEN) 300 MG/2 ML PNIJ    Inject 2 mLs (300 mg total) into the skin every 14 (fourteen) days.    EPINEPHRINE (EPIPEN) 0.3 MG/0.3 ML ATIN    USE 1 INJECTION AS DIRECTED AS NEEDED FOR EMERGENCY USE ONLY    FLUTICASONE PROPIONATE (FLONASE) 50 MCG/ACTUATION NASAL SPRAY    2 sprays by Each Nare route every evening.    KETOCONAZOLE (NIZORAL) 2 % CREAM    Apply topically once daily.    LEVOTHYROXINE (SYNTHROID) 112 MCG TABLET    TAKE 1 TABLET BY MOUTH BEFORE BREAKFAST.    LOSARTAN (COZAAR) 50 MG TABLET    TAKE 1 TABLET BY MOUTH EVERY DAY    MONTELUKAST (SINGULAIR) 10 MG TABLET    TAKE 1 TABLET BY MOUTH EVERY DAY IN THE EVENING    MUPIROCIN (BACTROBAN) 2 % OINTMENT    3 (three) times daily. Apply to affected area    TIRZEPATIDE (MOUNJARO) 7.5 MG/0.5 ML PNIJ    INJECT 7.5 MG SUBCUTANEOUSLY EVERY 7 DAYS FOR 28 DAYS    TRELEGY ELLIPTA 200-62.5-25 MCG INHALER    Inhale 1 puff into the lungs.   Discontinued Medications    ESTRADIOL (IMVEXXY MAINTENANCE PACK) 10 MCG INST    Place 10 mcg vaginally twice a week.

## 2024-12-10 DIAGNOSIS — E11.9 TYPE 2 DIABETES MELLITUS WITHOUT COMPLICATION, WITHOUT LONG-TERM CURRENT USE OF INSULIN: ICD-10-CM

## 2024-12-10 RX ORDER — DULAGLUTIDE 0.75 MG/.5ML
0.75 INJECTION, SOLUTION SUBCUTANEOUS
Qty: 4 PEN | Refills: 1 | Status: SHIPPED | OUTPATIENT
Start: 2024-12-10 | End: 2024-12-12

## 2024-12-10 RX ORDER — SEMAGLUTIDE 0.68 MG/ML
0.5 INJECTION, SOLUTION SUBCUTANEOUS
Qty: 1 EACH | Refills: 0 | Status: SHIPPED | OUTPATIENT
Start: 2024-12-10 | End: 2024-12-10

## 2024-12-11 ENCOUNTER — TELEPHONE (OUTPATIENT)
Dept: PRIMARY CARE CLINIC | Facility: CLINIC | Age: 59
End: 2024-12-11
Payer: COMMERCIAL

## 2024-12-12 ENCOUNTER — TELEPHONE (OUTPATIENT)
Dept: PRIMARY CARE CLINIC | Facility: CLINIC | Age: 59
End: 2024-12-12
Payer: COMMERCIAL

## 2024-12-12 DIAGNOSIS — E11.9 TYPE 2 DIABETES MELLITUS WITHOUT COMPLICATION, WITHOUT LONG-TERM CURRENT USE OF INSULIN: Primary | ICD-10-CM

## 2024-12-12 NOTE — TELEPHONE ENCOUNTER
PER PT'S INS IF SHE GOES UP TO THE MOUNJARO 10MG HER INS WILL COVER IF SHE NEEDS TO STAY AT THE MOUNJARO 7.5[facility] WILL NEED TO DO AN APPEAL FOR HER INS TO COVER MED

## 2024-12-12 NOTE — TELEPHONE ENCOUNTER
PER PT'S INS SHE NEEDS TO GO TO THE MOUNJARO 10MG AND HER INS WILL COVER--IF SHE NEEDS TO STAY AT M[facility] WILL NEED TO DO AN APPEAL

## 2024-12-17 DIAGNOSIS — J45.50 SEVERE PERSISTENT ASTHMA, UNSPECIFIED WHETHER COMPLICATED: Primary | ICD-10-CM

## 2024-12-17 RX ORDER — ALBUTEROL SULFATE 90 UG/1
2 INHALANT RESPIRATORY (INHALATION) EVERY 4 HOURS PRN
Qty: 18 G | Refills: 3 | Status: SHIPPED | OUTPATIENT
Start: 2024-12-17 | End: 2025-12-17

## 2024-12-17 RX ORDER — IPRATROPIUM BROMIDE AND ALBUTEROL SULFATE 2.5; .5 MG/3ML; MG/3ML
3 SOLUTION RESPIRATORY (INHALATION) EVERY 4 HOURS PRN
Qty: 90 ML | Refills: 2 | Status: SHIPPED | OUTPATIENT
Start: 2024-12-17 | End: 2024-12-19 | Stop reason: SDUPTHER

## 2024-12-17 NOTE — TELEPHONE ENCOUNTER
PA COMPLETED FOR THE MOUNJARO 10MG AND SENT TO HER INS--NOW WAITING ON A REPLY WHICH CAN TAKE UP TO 72 HRS

## 2024-12-18 ENCOUNTER — TELEPHONE (OUTPATIENT)
Dept: PRIMARY CARE CLINIC | Facility: CLINIC | Age: 59
End: 2024-12-18
Payer: COMMERCIAL

## 2024-12-18 NOTE — TELEPHONE ENCOUNTER
THIS PA REQUEST FOR LINDSAY IS DUPLICATE --A PA HAS ALREADY BEEN COMPLETED FOR MOUNJARO AND SENT TO HER INS--NOW WAITING ON A REPLY WHICH CAN TAKE UP TO 72 HRS

## 2024-12-19 ENCOUNTER — TELEPHONE (OUTPATIENT)
Dept: PRIMARY CARE CLINIC | Facility: CLINIC | Age: 59
End: 2024-12-19
Payer: COMMERCIAL

## 2024-12-19 DIAGNOSIS — J45.50 SEVERE PERSISTENT ASTHMA, UNSPECIFIED WHETHER COMPLICATED: ICD-10-CM

## 2024-12-19 DIAGNOSIS — J45.50 SEVERE PERSISTENT ASTHMA, UNSPECIFIED WHETHER COMPLICATED: Primary | ICD-10-CM

## 2024-12-19 RX ORDER — IPRATROPIUM BROMIDE AND ALBUTEROL SULFATE 2.5; .5 MG/3ML; MG/3ML
3 SOLUTION RESPIRATORY (INHALATION) EVERY 4 HOURS PRN
Qty: 90 ML | Refills: 2 | Status: SHIPPED | OUTPATIENT
Start: 2024-12-19 | End: 2025-12-19

## 2024-12-19 NOTE — TELEPHONE ENCOUNTER
----- Message from Troy sent at 12/19/2024  2:37 PM CST -----  Contact: [facility]  ..Type:  Patient Requesting Call    Who Called:[facility]   Does the patient know what this is regarding?:meds was sent to her rx in her building   Would the patient rather a call back or a response via [facility]? call  Best Call Back Number:[phone] option 2  Additional Information: nebulizer was sent to local rx but pt worried about equipment

## 2024-12-19 NOTE — TELEPHONE ENCOUNTER
Pa appeal was sent to pt's ins--[employer] [place]--notice received from her ins stating her Mounjaro is approved fro 12/1824-12/19/25--pt and pharmacy has been informed--co-pay is $25.00

## 2025-01-06 RX ORDER — FLUTICASONE FUROATE, UMECLIDINIUM BROMIDE AND VILANTEROL TRIFENATATE 200; 62.5; 25 UG/1; UG/1; UG/1
1 POWDER RESPIRATORY (INHALATION)
Qty: 60 EACH | Refills: 3 | Status: SHIPPED | OUTPATIENT
Start: 2025-01-06

## 2025-01-16 DIAGNOSIS — J45.50 SEVERE PERSISTENT ASTHMA WITH ALLERGIC RHINITIS, UNSPECIFIED WHETHER COMPLICATED: Primary | ICD-10-CM

## 2025-02-20 ENCOUNTER — PATIENT MESSAGE (OUTPATIENT)
Dept: ADMINISTRATIVE | Facility: HOSPITAL | Age: 60
End: 2025-02-20
Payer: COMMERCIAL

## 2025-02-27 DIAGNOSIS — E11.9 TYPE 2 DIABETES MELLITUS WITHOUT COMPLICATION, WITHOUT LONG-TERM CURRENT USE OF INSULIN: Primary | ICD-10-CM

## 2025-02-27 RX ORDER — AZITHROMYCIN 250 MG/1
TABLET, FILM COATED ORAL
Qty: 6 TABLET | Refills: 0 | Status: SHIPPED | OUTPATIENT
Start: 2025-02-27 | End: 2025-03-04

## 2025-02-27 RX ORDER — PROMETHAZINE HYDROCHLORIDE AND DEXTROMETHORPHAN HYDROBROMIDE 6.25; 15 MG/5ML; MG/5ML
5 SYRUP ORAL EVERY 4 HOURS PRN
Qty: 120 ML | Refills: 0 | Status: SHIPPED | OUTPATIENT
Start: 2025-02-27 | End: 2025-03-09

## 2025-03-04 ENCOUNTER — TELEPHONE (OUTPATIENT)
Dept: PRIMARY CARE CLINIC | Facility: CLINIC | Age: 60
End: 2025-03-04
Payer: COMMERCIAL

## 2025-06-06 DIAGNOSIS — J45.901 MILD ASTHMA WITH EXACERBATION, UNSPECIFIED WHETHER PERSISTENT: Primary | ICD-10-CM

## 2025-06-06 RX ORDER — METHYLPREDNISOLONE 4 MG/1
TABLET ORAL
Qty: 1 EACH | Refills: 1 | Status: SHIPPED | OUTPATIENT
Start: 2025-06-06

## 2025-07-23 ENCOUNTER — OFFICE VISIT (OUTPATIENT)
Dept: OBSTETRICS AND GYNECOLOGY | Facility: CLINIC | Age: 60
End: 2025-07-23
Payer: COMMERCIAL

## 2025-07-23 VITALS
WEIGHT: 159.38 LBS | BODY MASS INDEX: 31.29 KG/M2 | SYSTOLIC BLOOD PRESSURE: 111 MMHG | DIASTOLIC BLOOD PRESSURE: 78 MMHG | HEART RATE: 78 BPM | HEIGHT: 60 IN

## 2025-07-23 DIAGNOSIS — Z13.820 SCREENING FOR OSTEOPOROSIS: ICD-10-CM

## 2025-07-23 DIAGNOSIS — Z00.00 ENCOUNTER FOR WELLNESS EXAMINATION: ICD-10-CM

## 2025-07-23 DIAGNOSIS — N95.2 ATROPHIC VAGINITIS: ICD-10-CM

## 2025-07-23 DIAGNOSIS — Z12.31 SCREENING MAMMOGRAM FOR BREAST CANCER: Primary | ICD-10-CM

## 2025-07-23 DIAGNOSIS — B37.31 VAGINAL CANDIDA: ICD-10-CM

## 2025-07-23 DIAGNOSIS — Z12.4 ENCOUNTER FOR SCREENING FOR CERVICAL CANCER: ICD-10-CM

## 2025-07-23 DIAGNOSIS — Z78.0 MENOPAUSE: ICD-10-CM

## 2025-07-23 RX ORDER — FLUTICASONE PROPIONATE 50 MCG
2 SPRAY, SUSPENSION (ML) NASAL
COMMUNITY

## 2025-07-23 RX ORDER — APREMILAST 30 MG/1
30 TABLET, FILM COATED ORAL
COMMUNITY
Start: 2025-07-22

## 2025-07-23 RX ORDER — MONTELUKAST SODIUM 10 MG/1
10 TABLET ORAL NIGHTLY
COMMUNITY
Start: 2025-04-29 | End: 2026-04-29

## 2025-07-23 RX ORDER — FLUTICASONE FUROATE, UMECLIDINIUM BROMIDE AND VILANTEROL TRIFENATATE 200; 62.5; 25 UG/1; UG/1; UG/1
1 POWDER RESPIRATORY (INHALATION)
COMMUNITY
Start: 2025-04-09

## 2025-07-23 RX ORDER — FLUCONAZOLE 150 MG/1
150 TABLET ORAL EVERY OTHER DAY
Qty: 2 TABLET | Refills: 0 | Status: SHIPPED | OUTPATIENT
Start: 2025-07-23 | End: 2025-07-26

## 2025-07-23 RX ORDER — ERGOCALCIFEROL 1.25 MG/1
CAPSULE ORAL
Qty: 14 CAPSULE | Refills: 2 | Status: SHIPPED | OUTPATIENT
Start: 2025-07-23

## 2025-07-23 RX ORDER — ESTRADIOL 0.1 MG/G
1 CREAM VAGINAL
Qty: 42.5 G | Refills: 1 | Status: SHIPPED | OUTPATIENT
Start: 2025-07-24 | End: 2026-07-24

## 2025-07-23 RX ORDER — AMOXICILLIN AND CLAVULANATE POTASSIUM 875; 125 MG/1; MG/1
TABLET, FILM COATED ORAL
COMMUNITY
Start: 2025-05-22

## 2025-07-23 RX ORDER — ATORVASTATIN CALCIUM 20 MG/1
20 TABLET, FILM COATED ORAL
COMMUNITY
Start: 2025-04-16

## 2025-07-23 RX ORDER — ERGOCALCIFEROL 1.25 MG/1
50000 CAPSULE ORAL
COMMUNITY
Start: 2025-03-23

## 2025-07-23 RX ORDER — IPRATROPIUM BROMIDE AND ALBUTEROL SULFATE 2.5; .5 MG/3ML; MG/3ML
3 SOLUTION RESPIRATORY (INHALATION)
COMMUNITY
Start: 2024-12-19 | End: 2025-12-19

## 2025-07-23 RX ORDER — CLOTRIMAZOLE AND BETAMETHASONE DIPROPIONATE 10; .64 MG/G; MG/G
CREAM TOPICAL
COMMUNITY
Start: 2025-03-20

## 2025-07-23 RX ORDER — LOSARTAN POTASSIUM 50 MG/1
50 TABLET ORAL
COMMUNITY
Start: 2025-04-29 | End: 2026-04-29

## 2025-07-23 RX ORDER — LEVOTHYROXINE SODIUM 125 UG/1
125 TABLET ORAL EVERY MORNING
COMMUNITY
Start: 2025-07-08

## 2025-07-23 RX ORDER — CLOBETASOL PROPIONATE 0.5 MG/G
CREAM TOPICAL
COMMUNITY
Start: 2025-07-08 | End: 2026-07-08

## 2025-07-23 NOTE — PROGRESS NOTES
Subjective     Patient ID: Adri Thomason is a 60 y.o. female.    Chief Complaint:  Well Woman      History of Present Illness  HPI  This is a 60 year old female coming in for her annual exam. She also has complaints of severe pain with intercourse vaginal dryness and discharge     GYN & OB History  No LMP recorded. Patient is postmenopausal.   Date of Last Pap: 10/17/2022    OB History    Para Term  AB Living   2 2 2   2   SAB IAB Ectopic Multiple Live Births       2      # Outcome Date GA Lbr Donny/2nd Weight Sex Type Anes PTL Lv   2 Term     M CS-LTranv   ANGEL   1 Term     M CS-LTranv   ANGEL       Review of Systems  Review of Systems   Constitutional:  Negative for fatigue, fever and unexpected weight change.   Respiratory:  Negative for cough and shortness of breath.    Cardiovascular:  Negative for chest pain, palpitations and leg swelling.   Gastrointestinal:  Negative for abdominal pain, bloating, blood in stool, constipation, diarrhea, nausea and vomiting.   Genitourinary:  Positive for dyspareunia, pelvic pain and vaginal dryness. Negative for decreased libido, dysmenorrhea, dysuria, frequency, genital sores, hematuria, menorrhagia, menstrual problem, urgency, vaginal discharge, urinary incontinence and vaginal odor.   Musculoskeletal:  Negative for arthralgias and joint swelling.   Integumentary:  Negative for rash, mole/lesion, breast mass, nipple discharge, breast skin changes and breast tenderness.   Psychiatric/Behavioral: Negative.     Breast: Negative for asymmetry, lump, mass, nipple discharge, skin changes and tenderness         Objective   Physical Exam:   Constitutional: She appears well-developed and well-nourished.      Neck: No thyroid mass and no thyromegaly present.     Pulmonary/Chest: Chest wall is not dull to percussion. She exhibits no mass, no tenderness, no bony tenderness, no laceration, no crepitus, no edema, no deformity, no swelling and no retraction. Right breast  exhibits no inverted nipple, no mass, no nipple discharge, no skin change, no tenderness, presence, no bleeding and no swelling. Left breast exhibits no inverted nipple, no mass, no nipple discharge, no skin change, no tenderness, presence, no bleeding and no swelling.        Abdominal: Soft. Bowel sounds are normal. There is no abdominal tenderness. Hernia confirmed negative in the right inguinal area and confirmed negative in the left inguinal area.     Genitourinary:    Vagina and uterus normal.      Pelvic exam was performed with patient supine.     Labial bartholins normal.There is no rash, tenderness, lesion or injury on the right labia. There is no rash, tenderness, lesion or injury on the left labia. Cervix is normal. Right adnexum displays no mass, no tenderness and no fullness. Left adnexum displays no mass, no tenderness and no fullness. No rectocele, cystocele or prolapse of vaginal walls in the vagina. Vaginal atrophy noted.    Genitourinary Comments: Erythema desquamated tissue                  Skin: Skin is warm and dry.    Psychiatric: She has a normal mood and affect. Her speech is normal and behavior is normal.       Chaperone present        Assessment and Plan     1. Screening mammogram for breast cancer    2. Screening for osteoporosis    3. Menopause    4. Encounter for screening for cervical cancer    5. Encounter for wellness examination    6. Vaginal candida    7. Atrophic vaginitis            Plan:  Screening mammogram for breast cancer  -     Mammo Digital Screening Bilat; Future; Expected date: 07/23/2025    Screening for osteoporosis  -     DXA Bone Density Axial Skeleton 1 or more sites; Future; Expected date: 07/23/2025    Menopause  -     DXA Bone Density Axial Skeleton 1 or more sites; Future; Expected date: 07/23/2025    Encounter for screening for cervical cancer  -     Liquid-based pap smear, screening    Encounter for wellness examination    Vaginal candida  -     Vaginosis Screen  by DNA Probe; Future; Expected date: 07/23/2025    Atrophic vaginitis    Other orders  -     estradioL (ESTRACE) 0.01 % (0.1 mg/gram) vaginal cream; Place 1 g vaginally twice a week.  Dispense: 42.5 g; Refill: 1  -     ergocalciferol (VITAMIN D2) 50,000 unit Cap; Please convert to Aloe vera and Vit E vag cream Sig: apply vaginally twice a day as needed  Dispense: 14 capsule; Refill: 2  -     fluconazole (DIFLUCAN) 150 MG Tab; Take 1 tablet (150 mg total) by mouth every other day. for 2 doses  Dispense: 2 tablet; Refill: 0

## 2025-07-28 ENCOUNTER — PATIENT MESSAGE (OUTPATIENT)
Dept: OBSTETRICS AND GYNECOLOGY | Facility: CLINIC | Age: 60
End: 2025-07-28
Payer: COMMERCIAL

## 2025-08-01 ENCOUNTER — PATIENT MESSAGE (OUTPATIENT)
Dept: OBSTETRICS AND GYNECOLOGY | Facility: CLINIC | Age: 60
End: 2025-08-01
Payer: COMMERCIAL

## 2025-08-11 DIAGNOSIS — J45.901 MILD ASTHMA WITH EXACERBATION, UNSPECIFIED WHETHER PERSISTENT: Primary | ICD-10-CM

## 2025-08-11 RX ORDER — METHYLPREDNISOLONE 4 MG/1
TABLET ORAL
Qty: 21 EACH | Refills: 0 | Status: SHIPPED | OUTPATIENT
Start: 2025-08-11 | End: 2025-09-01

## 2025-08-11 RX ORDER — AZITHROMYCIN 250 MG/1
TABLET, FILM COATED ORAL
Qty: 6 TABLET | Refills: 0 | Status: SHIPPED | OUTPATIENT
Start: 2025-08-11 | End: 2025-08-16